# Patient Record
Sex: FEMALE | Race: WHITE | NOT HISPANIC OR LATINO | Employment: STUDENT | URBAN - METROPOLITAN AREA
[De-identification: names, ages, dates, MRNs, and addresses within clinical notes are randomized per-mention and may not be internally consistent; named-entity substitution may affect disease eponyms.]

---
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
## 2021-01-04 ENCOUNTER — OFFICE VISIT (OUTPATIENT)
Dept: GASTROENTEROLOGY | Facility: CLINIC | Age: 21
End: 2021-01-04
Payer: COMMERCIAL

## 2021-01-04 VITALS
HEIGHT: 63 IN | BODY MASS INDEX: 23.78 KG/M2 | DIASTOLIC BLOOD PRESSURE: 80 MMHG | SYSTOLIC BLOOD PRESSURE: 110 MMHG | WEIGHT: 134.2 LBS | HEART RATE: 96 BPM

## 2021-01-04 DIAGNOSIS — R10.9 ABDOMINAL PAIN, UNSPECIFIED ABDOMINAL LOCATION: Primary | ICD-10-CM

## 2021-01-04 DIAGNOSIS — K59.00 CONSTIPATION, UNSPECIFIED CONSTIPATION TYPE: ICD-10-CM

## 2021-01-04 PROCEDURE — 99213 OFFICE O/P EST LOW 20 MIN: CPT | Performed by: PHYSICIAN ASSISTANT

## 2021-01-04 PROCEDURE — 1036F TOBACCO NON-USER: CPT | Performed by: PHYSICIAN ASSISTANT

## 2021-01-04 PROCEDURE — 3008F BODY MASS INDEX DOCD: CPT | Performed by: PHYSICIAN ASSISTANT

## 2021-01-04 NOTE — PATIENT INSTRUCTIONS
Constipation   WHAT YOU NEED TO KNOW:   Constipation is when you have hard, dry bowel movements, or you go longer than usual between bowel movements  DISCHARGE INSTRUCTIONS:   Call your doctor if:   · You have blood in your bowel movements  · You have a fever and abdominal pain with the constipation  · Your constipation gets worse  · You start to vomit  · You have questions or concerns about your condition or care  Medicines:   · Medicine  such as a laxative may help relax and loosen your intestines to help you have a bowel movement  Your provider may recommend you only use laxatives for a short time  Long-term use may make your bowels dependent on the medicine  · Take your medicine as directed  Contact your healthcare provider if you think your medicine is not helping or if you have side effects  Tell him of her if you are allergic to any medicine  Keep a list of the medicines, vitamins, and herbs you take  Include the amounts, and when and why you take them  Bring the list or the pill bottles to follow-up visits  Carry your medicine list with you in case of an emergency  Relieve constipation:   · A suppository  may be used to help soften your bowel movements  This may make them easier to pass  A suppository is guided into your rectum through your anus  · An enema  is liquid medicine used to clear bowel movement from your rectum  The medicine is put into your rectum through your anus  Prevent constipation:   · Drink liquids as directed  You may need to drink extra liquids to help soften and move your bowels  Ask how much liquid to drink each day and which liquids are best for you  · Eat high-fiber foods  This may help decrease constipation by adding bulk to your bowel movements  High-fiber foods include fruit, vegetables, whole-grain breads and cereals, and beans  Your healthcare provider or dietitian can help you create a high-fiber meal plan   Your provider may also recommend a fiber supplement if you cannot get enough fiber from food  · Exercise regularly  Regular physical activity can help stimulate your intestines  Walking is a good exercise to prevent or relieve constipation  Ask which exercises are best for you  · Schedule a time each day to have a bowel movement  This may help train your body to have regular bowel movements  Bend forward while you are on the toilet to help move the bowel movement out  Sit on the toilet for at least 10 minutes, even if you do not have a bowel movement  · Talk to your healthcare provider about your medicines  Certain medicines, such as opioids, can cause constipation  Your provider may be able to make medicine changes  For example, he or she may change the kind of medicine, or change when you take it  Follow up with your healthcare provider as directed:  Write down your questions so you remember to ask them during your visits  © Copyright [address] Information is for End User's use only and may not be sold, redistributed or otherwise used for commercial purposes  All illustrations and images included in CareNotes® are the copyrighted property of [employer]  or [facility] Ranjith Bernal   The above information is an  only  It is not intended as medical advice for individual conditions or treatments  Talk to your doctor, nurse or pharmacist before following any medical regimen to see if it is safe and effective for you

## 2021-01-04 NOTE — PROGRESS NOTES
[facility]  Pantera Vivar 24 y o  female MRN: [id number]  Encounter: [id number]          ASSESSMENT AND PLAN:      1  Abdominal pain, unspecified abdominal location  2  Constipation, unspecified constipation type  -Will continue Elavil 10 mg q h s  Emanuel Ayala -Patient will continue Amitiza which she will adjust the dose as necessary   -Will continue Bentyl 20 mg as needed     -Follow-up in 3 months   ______________________________________________________________________    SUBJECTIVE:    23-year-old female who presents for follow-up of irritable bowel syndrome constipation predominance  Patient right now currently is maintained on amitriptyline 10 mg q h s  As well as dicyclomine 20 mg as needed  Patient also reports that during her last visit we did give her samples of Amitiza 8 micro g which she has been taking she is unsure whether this is working at 100% or not  She reports she is having a bowel movement almost on a daily basis  She denies any melena or rectal bleeding  She reports that for the most part all in all she feels well  Patient reports that her weight is stable and her appetite is good  REVIEW OF SYSTEMS IS OTHERWISE NEGATIVE        Historical Information   Past Medical History:   Diagnosis Date    Anxiety     Concussion     Last Assessed: 11/15/2017    Depression      Past Surgical History:   Procedure Laterality Date    APPENDECTOMY       Social History   Social History     Substance and Sexual Activity   Alcohol Use Yes    Frequency: Monthly or less    Comment: once or twice a year     Social History     Substance and Sexual Activity   Drug Use No     Social History     Tobacco Use   Smoking Status Never Smoker   Smokeless Tobacco Current User   Tobacco Comment    Vape     Family History   Problem Relation Age of Onset    Restless legs syndrome Mother     Depression Mother     Depression Father     Colon cancer Paternal Grandmother     Uterine cancer Paternal Grandmother     OCD Sister     Anxiety disorder Brother     Hashimoto's thyroiditis Maternal Grandmother     Breast cancer Neg Hx     Ovarian cancer Neg Hx     Cervical cancer Neg Hx        Meds/Allergies       Current Outpatient Medications:     albuterol (PROVENTIL HFA,VENTOLIN HFA) 90 mcg/act inhaler    amitriptyline (ELAVIL) 10 mg tablet    dicyclomine (BENTYL) 20 mg tablet    drospirenone-ethinyl estradiol (BERTRAM) 3-0 02 MG per tablet    fluconazole (DIFLUCAN) 150 mg tablet    Lidocaine Viscous HCl (XYLOCAINE) 2 % mucosal solution    meloxicam (MOBIC) 15 mg tablet    triamcinolone (KENALOG) 0 1 % oral topical paste    venlafaxine (EFFEXOR-XR) 37 5 mg 24 hr capsule    venlafaxine (EFFEXOR-XR) 75 mg 24 hr capsule    famciclovir (FAMVIR) 500 mg tablet    No Known Allergies        Objective     Blood pressure 110/80, pulse 96, height 5' 3" (1 6 m), weight 60 9 kg (134 lb 3 2 oz), not currently breastfeeding  Body mass index is 23 77 kg/m²  PHYSICAL EXAM:      General Appearance:   Alert, cooperative, no distress   HEENT:   Normocephalic, atraumatic, anicteric      Neck:  Supple, symmetrical, trachea midline   Lungs:   Clear to auscultation bilaterally; no rales, rhonchi or wheezing; respirations unlabored    Heart[de-identified]   Regular rate and rhythm; no murmur, rub, or gallop  Abdomen:   Soft, non-tender, non-distended; normal bowel sounds; no masses, no organomegaly    Genitalia:   Deferred    Rectal:   Deferred    Extremities:  No cyanosis, clubbing or edema    Pulses:  2+ and symmetric    Skin:  No jaundice, rashes, or lesions    Lymph nodes:  No palpable cervical lymphadenopathy        Lab Results:   No visits with results within 1 Day(s) from this visit     Latest known visit with results is:   Lab on 12/09/2020   Component Date Value    Hepatitis B Surface Ag 12/09/2020 Non-reactive     Hepatitis C Ab 12/09/2020 Non-reactive     Hep B C IgM 12/09/2020 Non-reactive     Hep B Core Total Ab 12/09/2020 Non-reactive     Cyclic Citrullin Peptide* 12/09/2020 5     Ferritin 12/09/2020 16     SS-A (RO) Ab 12/09/2020 <0 2     SS-B (LA) Ab 12/09/2020 <0 2     ds DNA Ab 12/09/2020 1     Anti FRED-1 12/09/2020 <0 2     Scleroderma SCL-70 12/09/2020 <0 2     Beta-2 Glyco 1 IgG 12/09/2020 <9     Beta-2 Glyco 1 IgA 12/09/2020 <9     Beta-2 Glyco 1 IgM 12/09/2020 <9     C3 Complement 12/09/2020 147 0     C4, COMPLEMENT 12/09/2020 40 0     Anticardiolipin IgA 12/09/2020 <9     Anticardiolipin IgG 12/09/2020 <9     Anticardiolipin IgM 12/09/2020 <9     IgA 12/09/2020 185     Gliadin IgA 12/09/2020 5     Gliadin IgG 12/09/2020 10     Tissue Transglut Ab IGG 12/09/2020 5     TISSUE TRANSGLUTAMINASE * 12/09/2020 <2     Endomysial IgA 12/09/2020 Negative     Anti-Centromere B Antibo* 12/09/2020 <0 2     HSV 1 IgG 12/09/2020 <0 91     HSV 2 IGG, TYPE SPEC 12/09/2020 <0 91     PTT Lupus Anticoagulant 12/09/2020 44 4     Dilute Viper Venom Time 12/09/2020 39 8     DILUTE PROTHROMBIN TIME(* 12/09/2020 32 0     THROMBIN TIME (DRVW) 12/09/2020 16 5     DPT CONFIRM RATIO 12/09/2020 1 15     LUPUS REFLEX INTERPRETAT* 12/09/2020 Comment:     SHAAN Corbett (SM) Ab 12/09/2020 <0 2     SHAAN RNP Ab 12/09/2020 <0 2     Miscellaneous Lab Test R* 12/09/2020 SEE WRITTEN REPORT     HIV-1/HIV-2 Ab 12/09/2020 Non-Reactive          Radiology Results:   No results found

## 2021-01-04 NOTE — LETTER
January 4, 2021     [place] Asher, [address] [address]    Patient: Lea Painter   YOB: 2000   Date of Visit: 1/4/2021       Dear Dr Pittman Brought: Thank you for referring Ke Irving to me for evaluation  Below are my notes for this consultation  If you have questions, please do not hesitate to call me  I look forward to following your patient along with you  Sincerely,        Hunter Walters PA-C        CC: No Recipients  Seun Evans  1/4/2021  1:01 PM  Sign when Signing Visit  [facility] - Outpatient Follow-up Note  Lea Painter 24 y o  female MRN: [id number]  Encounter: [id number]          ASSESSMENT AND PLAN:      1  Abdominal pain, unspecified abdominal location  2  Constipation, unspecified constipation type  -Will continue Elavil 10 mg q h s  Shmuel Patel -Patient will continue Amitiza which she will adjust the dose as necessary   -Will continue Bentyl 20 mg as needed     -Follow-up in 3 months   ______________________________________________________________________    SUBJECTIVE:    13-year-old female who presents for follow-up of irritable bowel syndrome constipation predominance  Patient right now currently is maintained on amitriptyline 10 mg q h s  As well as dicyclomine 20 mg as needed  Patient also reports that during her last visit we did give her samples of Amitiza 8 micro g which she has been taking she is unsure whether this is working at 100% or not  She reports she is having a bowel movement almost on a daily basis  She denies any melena or rectal bleeding  She reports that for the most part all in all she feels well  Patient reports that her weight is stable and her appetite is good  REVIEW OF SYSTEMS IS OTHERWISE NEGATIVE        Historical Information   Past Medical History:   Diagnosis Date    Anxiety     Concussion     Last Assessed: 11/15/2017    Depression      Past Surgical History: Procedure Laterality Date    APPENDECTOMY       Social History   Social History     Substance and Sexual Activity   Alcohol Use Yes    Frequency: Monthly or less    Comment: once or twice a year     Social History     Substance and Sexual Activity   Drug Use No     Social History     Tobacco Use   Smoking Status Never Smoker   Smokeless Tobacco Current User   Tobacco Comment    Vape     Family History   Problem Relation Age of Onset    Restless legs syndrome Mother     Depression Mother     Depression Father     Colon cancer Paternal Grandmother     Uterine cancer Paternal Grandmother    [facility] OCD Sister     Anxiety disorder Brother     Hashimoto's thyroiditis Maternal Grandmother     Breast cancer Neg Hx     Ovarian cancer Neg Hx     Cervical cancer Neg Hx        Meds/Allergies       Current Outpatient Medications:     albuterol (PROVENTIL HFA,VENTOLIN HFA) 90 mcg/act inhaler    amitriptyline (ELAVIL) 10 mg tablet    dicyclomine (BENTYL) 20 mg tablet    drospirenone-ethinyl estradiol (BERTRAM) 3-0 02 MG per tablet    fluconazole (DIFLUCAN) 150 mg tablet    Lidocaine Viscous HCl (XYLOCAINE) 2 % mucosal solution    meloxicam (MOBIC) 15 mg tablet    triamcinolone (KENALOG) 0 1 % oral topical paste    venlafaxine (EFFEXOR-XR) 37 5 mg 24 hr capsule    venlafaxine (EFFEXOR-XR) 75 mg 24 hr capsule    famciclovir (FAMVIR) 500 mg tablet    No Known Allergies        Objective     Blood pressure 110/80, pulse 96, height 5' 3" (1 6 m), weight 60 9 kg (134 lb 3 2 oz), not currently breastfeeding  Body mass index is 23 77 kg/m²  PHYSICAL EXAM:      General Appearance:   Alert, cooperative, no distress   HEENT:   Normocephalic, atraumatic, anicteric      Neck:  Supple, symmetrical, trachea midline   Lungs:   Clear to auscultation bilaterally; no rales, rhonchi or wheezing; respirations unlabored    Heart[de-identified]   Regular rate and rhythm; no murmur, rub, or gallop     Abdomen:   Soft, non-tender, non-distended; normal bowel sounds; no masses, no organomegaly    Genitalia:   Deferred    Rectal:   Deferred    Extremities:  No cyanosis, clubbing or edema    Pulses:  2+ and symmetric    Skin:  No jaundice, rashes, or lesions    Lymph nodes:  No palpable cervical lymphadenopathy        Lab Results:   No visits with results within 1 Day(s) from this visit     Latest known visit with results is:   Lab on 12/09/2020   Component Date Value    Hepatitis B Surface Ag 12/09/2020 Non-reactive     Hepatitis C Ab 12/09/2020 Non-reactive     Hep B C IgM 12/09/2020 Non-reactive     Hep B Core Total Ab 12/09/2020 Non-reactive     Cyclic Citrullin Peptide* 12/09/2020 5     Ferritin 12/09/2020 16     SS-A (RO) Ab 12/09/2020 <0 2     SS-B (LA) Ab 12/09/2020 <0 2     ds DNA Ab 12/09/2020 1     Anti FRED-1 12/09/2020 <0 2     Scleroderma SCL-70 12/09/2020 <0 2     Beta-2 Glyco 1 IgG 12/09/2020 <9     Beta-2 Glyco 1 IgA 12/09/2020 <9     Beta-2 Glyco 1 IgM 12/09/2020 <9     C3 Complement 12/09/2020 147 0     C4, COMPLEMENT 12/09/2020 40 0     Anticardiolipin IgA 12/09/2020 <9     Anticardiolipin IgG 12/09/2020 <9     Anticardiolipin IgM 12/09/2020 <9     IgA 12/09/2020 185     Gliadin IgA 12/09/2020 5     Gliadin IgG 12/09/2020 10     Tissue Transglut Ab IGG 12/09/2020 5     TISSUE TRANSGLUTAMINASE * 12/09/2020 <2     Endomysial IgA 12/09/2020 Negative     Anti-Centromere B Antibo* 12/09/2020 <0 2     HSV 1 IgG 12/09/2020 <0 91     HSV 2 IGG, TYPE SPEC 12/09/2020 <0 91     PTT Lupus Anticoagulant 12/09/2020 44 4     Dilute Viper Venom Time 12/09/2020 39 8     DILUTE PROTHROMBIN TIME(* 12/09/2020 32 0     THROMBIN TIME (DRVW) 12/09/2020 16 5     DPT CONFIRM RATIO 12/09/2020 1 15     LUPUS REFLEX INTERPRETAT* 12/09/2020 Comment:     SHAAN Corbett (SM) Ab 12/09/2020 <0 2     SHAAN RNP Ab 12/09/2020 <0 2     Miscellaneous Lab Test R* 12/09/2020 SEE WRITTEN REPORT     HIV-1/HIV-2 Ab 12/09/2020 Non-Reactive Radiology Results:   No results found

## 2021-01-19 DIAGNOSIS — R10.9 ABDOMINAL PAIN, UNSPECIFIED ABDOMINAL LOCATION: ICD-10-CM

## 2021-01-19 DIAGNOSIS — R19.8 CHANGE IN BOWEL FUNCTION: ICD-10-CM

## 2021-01-19 RX ORDER — AMITRIPTYLINE HYDROCHLORIDE 10 MG/1
10 TABLET, FILM COATED ORAL
Qty: 90 TABLET | Refills: 3 | Status: SHIPPED | OUTPATIENT
Start: 2021-01-19 | End: 2021-04-12

## 2021-02-10 DIAGNOSIS — Z30.41 ENCOUNTER FOR BIRTH CONTROL PILLS MAINTENANCE: ICD-10-CM

## 2021-02-10 RX ORDER — DROSPIRENONE AND ETHINYL ESTRADIOL 0.02-3(28)
KIT ORAL
Qty: 28 TABLET | Refills: 0 | Status: SHIPPED | OUTPATIENT
Start: 2021-02-10 | End: 2021-02-17 | Stop reason: SDUPTHER

## 2021-02-17 ENCOUNTER — ANNUAL EXAM (OUTPATIENT)
Dept: OBGYN CLINIC | Facility: CLINIC | Age: 21
End: 2021-02-17
Payer: COMMERCIAL

## 2021-02-17 VITALS
DIASTOLIC BLOOD PRESSURE: 86 MMHG | BODY MASS INDEX: 23.57 KG/M2 | SYSTOLIC BLOOD PRESSURE: 122 MMHG | WEIGHT: 133 LBS | HEIGHT: 63 IN

## 2021-02-17 DIAGNOSIS — Z30.41 SURVEILLANCE OF CONTRACEPTIVE PILL: ICD-10-CM

## 2021-02-17 DIAGNOSIS — Z01.411 ENCOUNTER FOR GYNECOLOGICAL EXAMINATION WITH ABNORMAL FINDING: Primary | ICD-10-CM

## 2021-02-17 DIAGNOSIS — R23.2 HOT FLASHES: ICD-10-CM

## 2021-02-17 DIAGNOSIS — Z11.3 SCREENING FOR STD (SEXUALLY TRANSMITTED DISEASE): ICD-10-CM

## 2021-02-17 DIAGNOSIS — Z30.41 ENCOUNTER FOR BIRTH CONTROL PILLS MAINTENANCE: ICD-10-CM

## 2021-02-17 DIAGNOSIS — N76.1 CHRONIC VAGINITIS: ICD-10-CM

## 2021-02-17 PROBLEM — Z01.419 ENCOUNTER FOR GYNECOLOGICAL EXAMINATION WITHOUT ABNORMAL FINDING: Status: RESOLVED | Noted: 2020-02-07 | Resolved: 2021-02-17

## 2021-02-17 PROBLEM — Z01.419 ENCOUNTER FOR ANNUAL ROUTINE GYNECOLOGICAL EXAMINATION: Status: RESOLVED | Noted: 2019-01-29 | Resolved: 2021-02-17

## 2021-02-17 PROBLEM — Z00.00 ANNUAL PHYSICAL EXAM: Status: RESOLVED | Noted: 2020-07-30 | Resolved: 2021-02-17

## 2021-02-17 PROBLEM — N89.8 VAGINAL DISCHARGE: Status: RESOLVED | Noted: 2019-01-29 | Resolved: 2021-02-17

## 2021-02-17 PROBLEM — Z23 NEED FOR HPV VACCINATION: Status: RESOLVED | Noted: 2020-02-07 | Resolved: 2021-02-17

## 2021-02-17 PROBLEM — Z23 ENCOUNTER FOR IMMUNIZATION: Status: RESOLVED | Noted: 2020-09-08 | Resolved: 2021-02-17

## 2021-02-17 PROBLEM — Z76.89 ENCOUNTER TO ESTABLISH CARE: Status: RESOLVED | Noted: 2020-07-16 | Resolved: 2021-02-17

## 2021-02-17 PROCEDURE — S0612 ANNUAL GYNECOLOGICAL EXAMINA: HCPCS | Performed by: NURSE PRACTITIONER

## 2021-02-17 PROCEDURE — 87491 CHLMYD TRACH DNA AMP PROBE: CPT | Performed by: NURSE PRACTITIONER

## 2021-02-17 PROCEDURE — 87591 N.GONORRHOEAE DNA AMP PROB: CPT | Performed by: NURSE PRACTITIONER

## 2021-02-17 RX ORDER — DROSPIRENONE AND ETHINYL ESTRADIOL 0.02-3(28)
1 KIT ORAL DAILY
Qty: 28 TABLET | Refills: 12 | Status: SHIPPED | OUTPATIENT
Start: 2021-02-17 | End: 2022-02-23

## 2021-02-17 RX ORDER — FLUCONAZOLE 150 MG/1
150 TABLET ORAL ONCE
Qty: 2 TABLET | Refills: 2 | Status: SHIPPED | OUTPATIENT
Start: 2021-02-17 | End: 2021-02-17

## 2021-02-17 NOTE — PROGRESS NOTES
Diagnoses and all orders for this visit:    Encounter for gynecological examination with abnormal finding    Chronic vaginitis  -     Chlamydia/GC amplified DNA by PCR  -     fluconazole (DIFLUCAN) 150 mg tablet; Take 1 tablet (150 mg total) by mouth once for 1 dose Once and repeat in 3 days    Surveillance of contraceptive pill    Hot flashes  -     TSH, 3rd generation with Free T4 reflex; Future    Screening for STD (sexually transmitted disease)  -     Chlamydia/GC amplified DNA by PCR    Encounter for birth control pills maintenance  -     drospirenone-ethinyl estradiol (BERTRAM) 3-0 02 MG per tablet; Take 1 tablet by mouth daily        Calcium/vit d inclusion in the diet discussed, call with any issues, SBE reinforced, all concerns addressed  Pleasant 24 y o  premenopausal female here for annual exam  She denies any issues with bleeding or her menses  Reports regular cycles on ocp  Denies vaginal issues  Denies pelvic pain  Denies any issues with her BCM  Sexually active without any concerns  Agrees to an STD recheck, GC/CT neg 2018, same partner x 4 yrs  Requests diflucan refills for frequent yeast  Received Gardasils x 3  She states she had had hot flashes for the past few months, will check TSH and if neg may increase her ocp      Past Medical History:   Diagnosis Date    Anxiety     Concussion     Last Assessed: 11/15/2017    Depression      Past Surgical History:   Procedure Laterality Date    APPENDECTOMY       Family History   Problem Relation Age of Onset    Restless legs syndrome Mother     Depression Mother     Depression Father     Colon cancer Paternal Grandmother     Uterine cancer Paternal Grandmother     OCD Sister     Anxiety disorder Brother     Hashimoto's thyroiditis Maternal Grandmother     Breast cancer Neg Hx     Ovarian cancer Neg Hx     Cervical cancer Neg Hx      Social History     Tobacco Use    Smoking status: Never Smoker    Smokeless tobacco: Current User    Tobacco comment: Vape   Substance Use Topics    Alcohol use: Yes     Frequency: Monthly or less     Comment: once or twice a year    Drug use: No       Current Outpatient Medications:     albuterol (PROVENTIL HFA,VENTOLIN HFA) 90 mcg/act inhaler, Inhale 1-2 puffs, Disp: , Rfl: 0    amitriptyline (ELAVIL) 10 mg tablet, Take 1 tablet (10 mg total) by mouth daily at bedtime, Disp: 90 tablet, Rfl: 3    dicyclomine (BENTYL) 20 mg tablet, Take 1 tablet (20 mg total) by mouth every 6 (six) hours, Disp: 60 tablet, Rfl: 3    drospirenone-ethinyl estradiol (BERTRAM) 3-0 02 MG per tablet, Take 1 tablet by mouth daily, Disp: 28 tablet, Rfl: 12    venlafaxine (EFFEXOR-XR) 37 5 mg 24 hr capsule, Take 1 capsule (37 5 mg total) by mouth daily, Disp: 90 capsule, Rfl: 1    venlafaxine (EFFEXOR-XR) 75 mg 24 hr capsule, Take 1 capsule (75 mg total) by mouth daily, Disp: 90 capsule, Rfl: 1    famciclovir (FAMVIR) 500 mg tablet, Take 1 tablet (500 mg total) by mouth 2 (two) times a day for 5 days, Disp: 10 tablet, Rfl: 0    fluconazole (DIFLUCAN) 150 mg tablet, Take 1 tablet (150 mg total) by mouth once for 1 dose Once and repeat in 3 days, Disp: 2 tablet, Rfl: 2    Lidocaine Viscous HCl (XYLOCAINE) 2 % mucosal solution, Swish and spit 15 mL 4 (four) times a day as needed for mouth pain or discomfort (Patient not taking: Reported on 2/17/2021), Disp: 100 mL, Rfl: 0    meloxicam (MOBIC) 15 mg tablet, Take 1 tablet (15 mg total) by mouth daily (Patient not taking: Reported on 2/17/2021), Disp: 30 tablet, Rfl: 0    triamcinolone (KENALOG) 0 1 % oral topical paste, Apply 1 application topically 2 (two) times a day (Patient not taking: Reported on 2/17/2021), Disp: 5 g, Rfl: 1  Patient Active Problem List    Diagnosis Date Noted    Chronic pain of both knees 09/08/2020    Canker sores oral 09/08/2020    Generalized anxiety disorder 04/16/2018    Current moderate episode of major depressive disorder without prior episode ([facility] [address] ) 2018    Post concussion syndrome 10/12/2017    Concussion with no loss of consciousness 10/03/2017    Closed fracture of left side of occipital bone (HCC) 2017    Chronic vaginitis 03/15/2017    Acne 2016       No Known Allergies    OB History    Para Term  AB Living   0 0 0 0 0 0   SAB TAB Ectopic Multiple Live Births   0 0 0 0 0     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, working on her E  [employer] as well  Vitals:    21 1213   BP: 122/86   BP Location: Left arm   Patient Position: Sitting   Cuff Size: Standard   Weight: 60 3 kg (133 lb)   Height: 5' 3" (1 6 m)     Body mass index is 23 56 kg/m²  Review of Systems   Constitutional: Negative for chills, fatigue, fever and unexpected weight change  Respiratory: Negative for shortness of breath  Gastrointestinal: Negative for anal bleeding, blood in stool, constipation and diarrhea  Genitourinary: Negative for difficulty urinating, dysuria and hematuria  Physical Exam   Constitutional: She appears well-developed and well-nourished  No distress  HENT:   Head: Normocephalic  Neck: Normal range of motion  Neck supple  Pulmonary: Effort normal   Breasts: bilateral without masses, skin changes or nipple discharge  Bilaterally soft and warm to touch  No areas of erythema or pain  Abdominal: Soft  Pelvic exam was performed with patient supine  No labial fusion  There is no rash, tenderness, lesion or injury on the right labia  There is no rash, tenderness, lesion or injury on the left labia  Urethral meatus does not show any tenderness, inflammation or discharge  Palpation of midline bladder without pain or discomfort  Uterus is not deviated, not enlarged, not fixed and not tender  Cervix exhibits no motion tenderness, no discharge and no friability  Right adnexum displays no mass, no tenderness and no fullness  Left adnexum displays no mass, no tenderness and no fullness   No erythema or tenderness in the vagina  No foreign body in the vagina  No signs of injury around the vagina  Scant vaginal discharge found (yeastlike) but she is asymptomatic  No signs of injury around the vagina or anus  Perineum without lesions, signs of injury, erythema or swelling  Lymphadenopathy:        Right: No inguinal adenopathy present  Left: No inguinal adenopathy present

## 2021-02-17 NOTE — PATIENT INSTRUCTIONS
Breast Self Exam for Women   WHAT YOU NEED TO KNOW:   What is a breast self-exam (BSE)? A BSE is a way to check your breasts for lumps and other changes  Regular BSEs can help you know how your breasts normally look and feel  Most breast lumps or changes are not cancer, but you should always have them checked by a healthcare provider  Your healthcare provider can also watch you do a BSE and can tell you if you are doing your BSE correctly  Why should I do a BSE? Breast cancer is the most common type of cancer in women  Even if you have mammograms, you may still want to do a BSE regularly  If you know how your breasts normally feel and look, it may help you know when to contact your healthcare provider  Mammograms can miss some cancers  You may find a lump during a BSE that did not show up on a mammogram   When should I do a BSE? If you have periods, you may want to do your BSE 1 week after your period ends  This is the time when your breasts may be the least swollen, lumpy, or tender  You can do regular BSEs even if you are breastfeeding or have breast implants  How should I do a BSE? · Look at your breasts in a mirror  Look at the size and shape of each breast and nipple  Check for swelling, lumps, dimpling, scaly skin, or other skin changes  Look for nipple changes, such as a nipple that is painful or beginning to pull inward  Gently squeeze both nipples and check to see if fluid (that is not breast milk) comes out of them  If you find any of these or other breast changes, contact your healthcare provider  Check your breasts while you sit or  the following 3 positions:    ? Hang your arms down at your sides  ? Raise your hands and join them behind your head  ? Put firm pressure with your hands on your hips  Bend slightly forward while you look at your breasts in the mirror  · Lie down and feel your breasts    When you lie down, your breast tissue spreads out evenly over your chest  This makes it easier for you to feel for lumps and anything that may not be normal for your breasts  Do a BSE on one breast at a time  ? Place a small pillow or towel under your left shoulder  Put your left arm behind your head  ? Use the 3 middle fingers of your right hand  Use your fingertip pads, on the top of your fingers  Your fingertip pad is the most sensitive part of your finger  ? Use small circles to feel your breast tissue  Use your fingertip pads to make dime-sized, overlapping circles on your breast and armpits  Use light, medium, and firm pressure  First, press lightly  Second, press with medium pressure to feel a little deeper into the breast  Last, use firm pressure to feel deep within your breast     ? Examine your entire breast area  Examine the breast area from above the breast to below the breast where you feel only ribs  Make small circles with your fingertips, starting in the middle of your armpit  Make circles going up and down the breast area  Continue toward your breast and all the way across it  Examine the area from your armpit all the way over to the middle of your chest (breastbone)  Stop at the middle of your chest     ? Move the pillow or towel to your right shoulder, and put your right arm behind your head  Use the 3 fingertip pads of your left hand, and repeat the above steps to do a BSE on your right breast   What else can I do to check for breast problems or cancer? Talk to your healthcare provider about mammograms  A mammogram is an x-ray of your breasts to screen for breast cancer or other problems  Your provider can tell you the benefits and risks of mammograms  The first mammogram is usually at age 39 or 48  Your provider may recommend you start at 36 or younger if your risk for breast cancer is high  Mammograms usually continue every 1 to 2 years until age 76  When should I call my doctor? · You find any lumps or changes in your breasts      · You have breast pain or fluid coming from your nipples  · You have questions or concerns or concerns about your condition or care  CARE AGREEMENT:   You have the right to help plan your care  Learn about your health condition and how it may be treated  Discuss treatment options with your healthcare providers to decide what care you want to receive  You always have the right to refuse treatment  The above information is an  only  It is not intended as medical advice for individual conditions or treatments  Talk to your doctor, nurse or pharmacist before following any medical regimen to see if it is safe and effective for you  © Copyright [address] [place] [address] Information is for End User's use only and may not be sold, redistributed or otherwise used for commercial purposes   All illustrations and images included in CareNotes® are the copyrighted property of A D A M , Inc  or [address] [place] [address]

## 2021-02-19 LAB
C TRACH DNA SPEC QL NAA+PROBE: NEGATIVE
N GONORRHOEA DNA SPEC QL NAA+PROBE: NEGATIVE

## 2021-03-30 NOTE — PROGRESS NOTES
Assessment and Plan:   Ms Priyanka Martin is a 28-year-old female with history significant for irritable bowel syndrome with constipation, anxiety and depression, who presents for follow up of multiple complaints including recurrent mouth ulcerations and joint pains        - Brenda presents today for follow up of multiple complaints she has been experiencing since 2020, including recurrent painful mouth ulcerations, lower abdominal discomfort, possible Raynaud's phenomenon effecting her bilateral feet as well as symptoms of progressive arthralgias involving her hips and knees over the past 3 years        - In regards to the lower abdominal discomfort she has had a Gastroenterology evaluation performed which was unrevealing specifically for an inflammatory bowel disease and this has been determined to be secondary to irritable bowel syndrome  The symptoms of sensitivity and color changes she experiences effecting her feet do not sound clearly representative of Raynaud's phenomenon as this will appear spontaneously and not necessarily brought on by any specific exposures  In terms of the arthralgias her symptoms are less concerning for an inflammatory arthropathy based on absence of prominent swelling or prolonged morning stiffness and there was no synovitis on her examination previously  It is possible these complaints have been arising independently and not associated with an underlying unifying disorder     - To further evaluate for a possible underlying autoimmune disorder, she did have extensive testing done which in the past has showed negative ABEL screens with a more recent ABEL by immunofluorescence showing a dense fine speckled pattern  Otherwise her specific serologies were all negative    I advised her with a history of prior negative ABEL screens and a current dense fine speckled pattern which may have less of an association with autoimmune diseases, we do not have sufficient information to diagnose her with a connective tissue disorder  I recommend continued monitoring for now and for her symptoms to be managed individually  It is reassuring to note that she has not had any recent mouth ulcerations and I question if this may be secondary to idiopathic recurrent aphthous stomatitis  - I requested she make me aware if she has any worsening of existing symptoms or new complaints that arise  Otherwise I will see her back for a follow-up in 1 year to review and repeat her serologies  Plan:  Diagnoses and all orders for this visit:    Mouth ulcers    Diffuse arthralgia    Irritable bowel syndrome with constipation    Positive ABEL (antinuclear antibody)      Activities as tolerated  Exercise: try to maintain a low impact exercise regimen as much as possible  Walk for 30 minutes a day for at least 3 days a week  Continue other medications as prescribed by PCP and other specialists  RTC in 1 year  HPI    INITIAL VISIT NOTE (12/2020):  Ms Santosh Hines is a 16-year-old female with history significant for irritable bowel syndrome with constipation, anxiety and depression, who presents for further evaluation of multiple complaints including recurrent mouth ulcerations and joint pains  She is referred by MAYRA Roach for a rheumatology consult      Patient reports she has experienced joint pains affecting her low back/hip region and bilateral knees probably since her childhood, but she has noticed them to be more prominent and progressive over the past 2-3 years  She had seen orthopedics while she was in high school and had to utilize knee braces  She reports while the pain may be present on a low level intensity nearly every day, approximately once per week she will have a significant flare-up of the pain  She reports with resting she may get some relief but not every time  She does take over-the-counter ibuprofen as needed for the joint pains which helps    In terms of the low back pain this does not wake her up from sleep at night and is not associated with morning stiffness  She reports activities will generally worsen her pain and she does get some relief with resting  At times she may notice an achiness sensation in her hands  She denies any joint pains of her wrists, elbows, shoulders, ankles or feet  Intermittently she has noticed mild swelling affecting her knees  She experiences morning stiffness only affecting her knees which takes about 30-45 minutes to improve  She has had x-rays of her hips and knees done in 2018 and 2020 which were all normal      Over the past 6 months she has also noticed additional symptoms arise such as recurrent painful mouth ulcerations that appear like canker sores  They resolve spontaneously but she states over the past few months they have been present on a near constant basis  She has seen her primary care physician for this and had an HSV swab done which was normal   She has also been trialed on multiple medications including fluconazole, famciclovir, lidocaine mucosal solution and triamcinolone topical paste which have not helped significantly  She does not get any ulcers in her nose or in her genital region, but does describe frequent yeast infections        In the past 6 months she has also had issues with lower abdominal pain flare-ups associated with constipation  She was seen by Gastroenterology and had a CT abdomen as well as colonoscopy done which were all normal   She was diagnosed with irritable bowel syndrome with constipation  She has noticed issues relating to her toes being very sensitive with color changes to white, but this does not always happen with cold exposure and seems to occur spontaneously    She does not notice any color changes affecting her hands      She denies fevers, chills, unintentional weight loss (has noticed night sweats), hair loss, dry eyes (does notice dry mouth), inflammatory eye disease, skin rash, psoriasis, photosensitivity, swollen glands, pleuritic chest pain, shortness of breath, blood in stools, blood clots, miscarriages, symptoms strongly suggestive of Raynaud's or family history of autoimmune disease  She does have 2 siblings who are healthy  She did have appendicitis when she was in 2nd grade but otherwise had a healthy childhood      In view of the constellation of symptoms she was seen by her primary care physician and had testing done in September 2020 which showed a normal ABEL, CK, rheumatoid factor, Lyme antibody profile and ESR  The CRP was minimally elevated at 5  Prior to that a CBC, CMP, TSH and vitamin-D were normal   She did have testing done in October 2018 as well which showed a normal ABEL, rheumatoid factor and HLA B27 antigen  The ESR and CRP were minimally elevated at 25 and 6, respectively  3/31/2021:  Patient presents for a follow-up today  I reviewed her labs which showed a positive ABEL by immunofluorescence 1:160 dense fine speckled pattern  The ABEL specificity, C3, C4, antiphospholipid antibody testing, urinalysis, urine protein creatinine ratio, hepatitis panel, ferritin, anti CCP antibody, celiac disease antibody profile, HIV testing and herpes antibodies were unremarkable  Since the last office visit she has not had any new complaints and continues to experience the night sweats, lower abdominal discomfort, Raynaud's like symptoms and joint pains  She states that the mouth ulcers resolved spontaneously and she has not had any in the past 1 and half months  She did try the over-the-counter zinc but this was not helpful  The following portions of the patient's history were reviewed and updated as appropriate: allergies, current medications, past family history, past medical history, past social history, past surgical history and problem list       Review of Systems  Constitutional: Negative for weight change, fevers, chills, fatigue  Positive for night sweats  ENT/Mouth: Negative for hearing changes, ear pain, nasal congestion, sinus pain, hoarseness, sore throat, rhinorrhea, swallowing difficulty  Eyes: Negative for pain, redness, discharge, vision changes  Cardiovascular: Negative for chest pain, SOB, palpitations  Respiratory: Negative for cough, sputum, wheezing, dyspnea  Gastrointestinal: Negative for nausea, vomiting, diarrhea, constipation, pain, heartburn  Genitourinary: Negative for dysuria, urinary frequency, hematuria  Musculoskeletal: As per HPI  Skin: Negative for skin rash  Positive for color changes  Neuro: Negative for weakness, numbness, tingling, loss of consciousness  Psych: Negative for anxiety, depression  Heme/Lymph: Negative for easy bruising, bleeding, lymphadenopathy          Past Medical History:   Diagnosis Date    Anxiety     Concussion     Last Assessed: 11/15/2017    Depression        Past Surgical History:   Procedure Laterality Date    APPENDECTOMY         Social History     Socioeconomic History    Marital status: Single     Spouse name: Not on file    Number of children: Not on file    Years of education: Not on file    Highest education level: Not on file   Occupational History    Not on file   Social Needs    Financial resource strain: Not on file    Food insecurity     Worry: Not on file     Inability: Not on file    Transportation needs     Medical: Not on file     Non-medical: Not on file   Tobacco Use    Smoking status: Never Smoker    Smokeless tobacco: Current User    Tobacco comment: Vape   Substance and Sexual Activity    Alcohol use: Yes     Frequency: Monthly or less     Comment: once or twice a year    Drug use: No    Sexual activity: Yes     Partners: Male     Birth control/protection: OCP   Lifestyle    Physical activity     Days per week: Not on file     Minutes per session: Not on file    Stress: Not on file   Relationships    Social connections     Talks on phone: Not on file Gets together: Not on file     Attends Taoist service: Not on file     Active member of club or organization: Not on file     Attends meetings of clubs or organizations: Not on file     Relationship status: Not on file    Intimate partner violence     Fear of current or ex partner: Not on file     Emotionally abused: Not on file     Physically abused: Not on file     Forced sexual activity: Not on file   Other Topics Concern    Not on file   Social History Narrative    Immunization status: up to date and documented         Family History   Problem Relation Age of Onset    Restless legs syndrome Mother     Depression Mother     Depression Father     Colon cancer Paternal Grandmother     Uterine cancer Paternal Grandmother     OCD Sister     Anxiety disorder Brother     Hashimoto's thyroiditis Maternal Grandmother     Breast cancer Neg Hx     Ovarian cancer Neg Hx     Cervical cancer Neg Hx        No Known Allergies      Current Outpatient Medications:     albuterol (PROVENTIL HFA,VENTOLIN HFA) 90 mcg/act inhaler, Inhale 1-2 puffs, Disp: , Rfl: 0    amitriptyline (ELAVIL) 10 mg tablet, Take 1 tablet (10 mg total) by mouth daily at bedtime, Disp: 90 tablet, Rfl: 3    dicyclomine (BENTYL) 20 mg tablet, Take 1 tablet (20 mg total) by mouth every 6 (six) hours, Disp: 60 tablet, Rfl: 3    drospirenone-ethinyl estradiol (BERTRAM) 3-0 02 MG per tablet, Take 1 tablet by mouth daily, Disp: 28 tablet, Rfl: 12    venlafaxine (EFFEXOR-XR) 37 5 mg 24 hr capsule, Take 1 capsule (37 5 mg total) by mouth daily, Disp: 90 capsule, Rfl: 1    venlafaxine (EFFEXOR-XR) 75 mg 24 hr capsule, Take 1 capsule (75 mg total) by mouth daily, Disp: 90 capsule, Rfl: 1    famciclovir (FAMVIR) 500 mg tablet, Take 1 tablet (500 mg total) by mouth 2 (two) times a day for 5 days, Disp: 10 tablet, Rfl: 0    Lidocaine Viscous HCl (XYLOCAINE) 2 % mucosal solution, Swish and spit 15 mL 4 (four) times a day as needed for mouth pain or discomfort (Patient not taking: Reported on 2/17/2021), Disp: 100 mL, Rfl: 0    meloxicam (MOBIC) 15 mg tablet, Take 1 tablet (15 mg total) by mouth daily (Patient not taking: Reported on 2/17/2021), Disp: 30 tablet, Rfl: 0    triamcinolone (KENALOG) 0 1 % oral topical paste, Apply 1 application topically 2 (two) times a day (Patient not taking: Reported on 2/17/2021), Disp: 5 g, Rfl: 1      Objective:    Vitals:    03/31/21 1115   BP: 106/64   Weight: 60 3 kg (133 lb)   Height: 5' 3" (1 6 m)       Physical Exam  General: Well appearing, well nourished, in no distress  Oriented x 3, normal mood and affect  Ambulating without difficulty  Skin: Good turgor, no rash, unusual bruising or prominent lesions  Hair: Normal texture and distribution  Nails: Normal color, no deformities  HEENT:  Head: Normocephalic, atraumatic  Eyes: Conjunctiva clear, sclera non-icteric, EOM intact  Extremities: No amputations or deformities, cyanosis, edema  Musculoskeletal: No swelling visualized  Neurologic: Alert and oriented  No focal neurological deficits appreciated  Psychiatric: Normal mood and affect  BALDEMAR Mondragon    Rheumatology

## 2021-03-31 ENCOUNTER — OFFICE VISIT (OUTPATIENT)
Dept: RHEUMATOLOGY | Facility: CLINIC | Age: 21
End: 2021-03-31
Payer: COMMERCIAL

## 2021-03-31 VITALS
WEIGHT: 133 LBS | SYSTOLIC BLOOD PRESSURE: 106 MMHG | BODY MASS INDEX: 23.57 KG/M2 | DIASTOLIC BLOOD PRESSURE: 64 MMHG | HEIGHT: 63 IN

## 2021-03-31 DIAGNOSIS — K58.1 IRRITABLE BOWEL SYNDROME WITH CONSTIPATION: ICD-10-CM

## 2021-03-31 DIAGNOSIS — R76.8 POSITIVE ANA (ANTINUCLEAR ANTIBODY): ICD-10-CM

## 2021-03-31 DIAGNOSIS — M25.50 DIFFUSE ARTHRALGIA: ICD-10-CM

## 2021-03-31 DIAGNOSIS — K12.1 MOUTH ULCERS: Primary | ICD-10-CM

## 2021-03-31 PROCEDURE — 99213 OFFICE O/P EST LOW 20 MIN: CPT | Performed by: INTERNAL MEDICINE

## 2021-03-31 PROCEDURE — 1036F TOBACCO NON-USER: CPT | Performed by: INTERNAL MEDICINE

## 2021-04-01 ENCOUNTER — OFFICE VISIT (OUTPATIENT)
Dept: GASTROENTEROLOGY | Facility: CLINIC | Age: 21
End: 2021-04-01
Payer: COMMERCIAL

## 2021-04-01 VITALS
HEART RATE: 123 BPM | WEIGHT: 130 LBS | HEIGHT: 63 IN | SYSTOLIC BLOOD PRESSURE: 108 MMHG | BODY MASS INDEX: 23.04 KG/M2 | DIASTOLIC BLOOD PRESSURE: 70 MMHG

## 2021-04-01 DIAGNOSIS — K59.00 CONSTIPATION, UNSPECIFIED CONSTIPATION TYPE: Primary | ICD-10-CM

## 2021-04-01 DIAGNOSIS — R10.9 ABDOMINAL PAIN, UNSPECIFIED ABDOMINAL LOCATION: ICD-10-CM

## 2021-04-01 PROCEDURE — 3008F BODY MASS INDEX DOCD: CPT | Performed by: INTERNAL MEDICINE

## 2021-04-01 PROCEDURE — 99213 OFFICE O/P EST LOW 20 MIN: CPT | Performed by: PHYSICIAN ASSISTANT

## 2021-04-01 NOTE — PATIENT INSTRUCTIONS
Abdominal Pain   WHAT YOU NEED TO KNOW:   Abdominal pain can be dull, achy, or sharp  You may have pain in one area of your abdomen, or in your entire abdomen  Your pain may be caused by a condition such as constipation, food sensitivity or poisoning, infection, or a blockage  Abdominal pain can also be from a hernia, appendicitis, or an ulcer  Liver, gallbladder, or kidney conditions can also cause abdominal pain  The cause of your abdominal pain may be unknown  DISCHARGE INSTRUCTIONS:   Return to the emergency department if:   · You have new chest pain or shortness of breath  · You have pulsing pain in your upper abdomen or lower back that suddenly becomes constant  · Your pain is in the right lower abdominal area and worsens with movement  · You have a fever over 100 4°F (38°C) or shaking chills  · You are vomiting and cannot keep food or liquids down  · Your pain does not improve or gets worse over the next 8 to 12 hours  · You see blood in your vomit or bowel movements, or they look black and tarry  · Your skin or the whites of your eyes turn yellow  · You are a woman and have a large amount of vaginal bleeding that is not your monthly period  Contact your healthcare provider if:   · You have pain in your lower back  · You are a man and have pain in your testicles  · You have pain when you urinate  · You have questions or concerns about your condition or care  Follow up with your healthcare provider within 24 hours or as directed:  Write down your questions so you remember to ask them during your visits  Medicines:   · Medicines  may be given to calm your stomach and prevent vomiting or to decrease pain  Ask how to take pain medicine safely  · Take your medicine as directed  Contact your healthcare provider if you think your medicine is not helping or if you have side effects  Tell him of her if you are allergic to any medicine   Keep a list of the medicines, vitamins, and herbs you take  Include the amounts, and when and why you take them  Bring the list or the pill bottles to follow-up visits  Carry your medicine list with you in case of an emergency  © Copyright [address] Information is for End User's use only and may not be sold, redistributed or otherwise used for commercial purposes  All illustrations and images included in CareNotes® are the copyrighted property of A D A M , Inc  or [facility] Ranjith Bernal   The above information is an  only  It is not intended as medical advice for individual conditions or treatments  Talk to your doctor, nurse or pharmacist before following any medical regimen to see if it is safe and effective for you

## 2021-04-01 NOTE — PROGRESS NOTES
[facility] - Outpatient Follow-up Note  Alden Koyanagi 24 y o  female MRN: [id number]  Encounter: [id number]          ASSESSMENT AND PLAN:      1  Constipation, unspecified constipation type  2  Abdominal pain, unspecified abdominal location  -Will continue MiraLax but it b i d  dosage     -Will continue dicyclomine 2 to 3 times a day  -Patient will restart probiotic daily     -Will continue fiber supplementation twice a day  -Patient will make some dietary changes     -Will have patient follow up in 3 months   ______________________________________________________________________    SUBJECTIVE:   15-year-old female presents to the office today for the evaluation of abdominal pain and constipation  Patient reports that a month ago she had ECT for Elavil 10 mg because she feels this was worsening her nausea  She is still taking dicyclomine as needed as well as MiraLax as well as fiber supplementation twice a day  Patient reports that the once a day MiraLax does not seem to be doing the trick  Patient is going to increase  Patient did stop her probiotic several months ago  Patient denies any alarm symptoms  Patient reports that her schedule of eating is not always on track  We have had a discussion about this in the office today  REVIEW OF SYSTEMS IS OTHERWISE NEGATIVE        Historical Information   Past Medical History:   Diagnosis Date    Anxiety     Concussion     Last Assessed: 11/15/2017    Depression      Past Surgical History:   Procedure Laterality Date    APPENDECTOMY       Social History   Social History     Substance and Sexual Activity   Alcohol Use Yes    Frequency: Monthly or less    Comment: once or twice a year     Social History     Substance and Sexual Activity   Drug Use No     Social History     Tobacco Use   Smoking Status Never Smoker   Smokeless Tobacco Current User   Tobacco Comment    Vape     Family History   Problem Relation Age of Onset  Restless legs syndrome Mother     Depression Mother     Depression Father     Colon cancer Paternal Grandmother     Uterine cancer Paternal [de-identified]     OCD Sister     Anxiety disorder Brother     Hashimoto's thyroiditis Maternal Grandmother     Breast cancer Neg Hx     Ovarian cancer Neg Hx     Cervical cancer Neg Hx        Meds/Allergies       Current Outpatient Medications:     albuterol (PROVENTIL HFA,VENTOLIN HFA) 90 mcg/act inhaler    amitriptyline (ELAVIL) 10 mg tablet    dicyclomine (BENTYL) 20 mg tablet    drospirenone-ethinyl estradiol (BERTRAM) 3-0 02 MG per tablet    venlafaxine (EFFEXOR-XR) 37 5 mg 24 hr capsule    venlafaxine (EFFEXOR-XR) 75 mg 24 hr capsule    famciclovir (FAMVIR) 500 mg tablet    Lidocaine Viscous HCl (XYLOCAINE) 2 % mucosal solution    meloxicam (MOBIC) 15 mg tablet    triamcinolone (KENALOG) 0 1 % oral topical paste    No Known Allergies        Objective     Blood pressure 108/70, pulse (!) 123, height 5' 3" (1 6 m), weight 59 kg (130 lb), not currently breastfeeding  Body mass index is 23 03 kg/m²  PHYSICAL EXAM:      General Appearance:   Alert, cooperative, no distress   HEENT:   Normocephalic, atraumatic, anicteric      Neck:  Supple, symmetrical, trachea midline   Lungs:   Clear to auscultation bilaterally; no rales, rhonchi or wheezing; respirations unlabored    Heart[de-identified]   Regular rate and rhythm; no murmur, rub, or gallop  Abdomen:   Soft, non-tender, non-distended; normal bowel sounds; no masses, no organomegaly    Genitalia:   Deferred    Rectal:   Deferred    Extremities:  No cyanosis, clubbing or edema    Pulses:  2+ and symmetric    Skin:  No jaundice, rashes, or lesions    Lymph nodes:  No palpable cervical lymphadenopathy        Lab Results:   No visits with results within 1 Day(s) from this visit     Latest known visit with results is:   Annual Exam on 02/17/2021   Component Date Value    N gonorrhoeae, DNA Probe 02/17/2021 Negative     Chlamydia trachomatis, D* 02/17/2021 Negative          Radiology Results:   No results found

## 2021-04-01 NOTE — LETTER
April 1, 2021     Julee Pagan, [address] [address]    Patient: Reba Nick   YOB: 2000   Date of Visit: 4/1/2021       Dear Dr Mckeon Shadow: Thank you for referring Angela Cheng to me for evaluation  Below are my notes for this consultation  If you have questions, please do not hesitate to call me  I look forward to following your patient along with you  Sincerely,        Narcisa Rosado PA-C        CC: No Recipients  Seun Bennett  4/1/2021 11:59 AM  Sign when Signing Visit  [facility] - Outpatient Follow-up Note  Reba Nick 24 y o  female MRN: [id number]  Encounter: [id number]          ASSESSMENT AND PLAN:      1  Constipation, unspecified constipation type  2  Abdominal pain, unspecified abdominal location  -Will continue MiraLax but it b i d  dosage     -Will continue dicyclomine 2 to 3 times a day  -Patient will restart probiotic daily     -Will continue fiber supplementation twice a day  -Patient will make some dietary changes     -Will have patient follow up in 3 months   ______________________________________________________________________    SUBJECTIVE:   67-year-old female presents to the office today for the evaluation of abdominal pain and constipation  Patient reports that a month ago she had ECT for Elavil 10 mg because she feels this was worsening her nausea  She is still taking dicyclomine as needed as well as MiraLax as well as fiber supplementation twice a day  Patient reports that the once a day MiraLax does not seem to be doing the trick  Patient is going to increase  Patient did stop her probiotic several months ago  Patient denies any alarm symptoms  Patient reports that her schedule of eating is not always on track  We have had a discussion about this in the office today  REVIEW OF SYSTEMS IS OTHERWISE NEGATIVE        Historical Information   Past Medical History: Diagnosis Date    Anxiety     Concussion     Last Assessed: 11/15/2017    Depression      Past Surgical History:   Procedure Laterality Date    APPENDECTOMY       Social History   Social History     Substance and Sexual Activity   Alcohol Use Yes    Frequency: Monthly or less    Comment: once or twice a year     Social History     Substance and Sexual Activity   Drug Use No     Social History     Tobacco Use   Smoking Status Never Smoker   Smokeless Tobacco Current User   Tobacco Comment    Vape     Family History   Problem Relation Age of Onset    Restless legs syndrome Mother     Depression Mother     Depression Father     Colon cancer Paternal Grandmother     Uterine cancer Paternal Grandmother    Clifm Edgard OCD Sister     Anxiety disorder Brother     Hashimoto's thyroiditis Maternal Grandmother     Breast cancer Neg Hx     Ovarian cancer Neg Hx     Cervical cancer Neg Hx        Meds/Allergies       Current Outpatient Medications:     albuterol (PROVENTIL HFA,VENTOLIN HFA) 90 mcg/act inhaler    amitriptyline (ELAVIL) 10 mg tablet    dicyclomine (BENTYL) 20 mg tablet    drospirenone-ethinyl estradiol (BERTRAM) 3-0 02 MG per tablet    venlafaxine (EFFEXOR-XR) 37 5 mg 24 hr capsule    venlafaxine (EFFEXOR-XR) 75 mg 24 hr capsule    famciclovir (FAMVIR) 500 mg tablet    Lidocaine Viscous HCl (XYLOCAINE) 2 % mucosal solution    meloxicam (MOBIC) 15 mg tablet    triamcinolone (KENALOG) 0 1 % oral topical paste    No Known Allergies        Objective     Blood pressure 108/70, pulse (!) 123, height 5' 3" (1 6 m), weight 59 kg (130 lb), not currently breastfeeding  Body mass index is 23 03 kg/m²        PHYSICAL EXAM:      General Appearance:   Alert, cooperative, no distress   HEENT:   Normocephalic, atraumatic, anicteric      Neck:  Supple, symmetrical, trachea midline   Lungs:   Clear to auscultation bilaterally; no rales, rhonchi or wheezing; respirations unlabored    Heart[de-identified]   Regular rate and rhythm; no murmur, rub, or gallop  Abdomen:   Soft, non-tender, non-distended; normal bowel sounds; no masses, no organomegaly    Genitalia:   Deferred    Rectal:   Deferred    Extremities:  No cyanosis, clubbing or edema    Pulses:  2+ and symmetric    Skin:  No jaundice, rashes, or lesions    Lymph nodes:  No palpable cervical lymphadenopathy        Lab Results:   No visits with results within 1 Day(s) from this visit  Latest known visit with results is:   Annual Exam on 02/17/2021   Component Date Value    N gonorrhoeae, DNA Probe 02/17/2021 Negative     Chlamydia trachomatis, D* 02/17/2021 Negative          Radiology Results:   No results found

## 2021-04-06 DIAGNOSIS — Z23 ENCOUNTER FOR IMMUNIZATION: ICD-10-CM

## 2021-04-07 ENCOUNTER — IMMUNIZATIONS (OUTPATIENT)
Dept: FAMILY MEDICINE CLINIC | Facility: HOSPITAL | Age: 21
End: 2021-04-07

## 2021-04-07 DIAGNOSIS — Z23 ENCOUNTER FOR IMMUNIZATION: Primary | ICD-10-CM

## 2021-04-07 PROCEDURE — 91300 SARS-COV-2 / COVID-19 MRNA VACCINE (PFIZER-BIONTECH) 30 MCG: CPT

## 2021-04-07 PROCEDURE — 0001A SARS-COV-2 / COVID-19 MRNA VACCINE (PFIZER-BIONTECH) 30 MCG: CPT

## 2021-04-12 ENCOUNTER — OFFICE VISIT (OUTPATIENT)
Dept: PSYCHIATRY | Facility: CLINIC | Age: 21
End: 2021-04-12
Payer: COMMERCIAL

## 2021-04-12 DIAGNOSIS — F41.1 GENERALIZED ANXIETY DISORDER: Primary | ICD-10-CM

## 2021-04-12 DIAGNOSIS — F32.1 CURRENT MODERATE EPISODE OF MAJOR DEPRESSIVE DISORDER WITHOUT PRIOR EPISODE (HCC): ICD-10-CM

## 2021-04-12 PROCEDURE — 99214 OFFICE O/P EST MOD 30 MIN: CPT | Performed by: STUDENT IN AN ORGANIZED HEALTH CARE EDUCATION/TRAINING PROGRAM

## 2021-04-12 PROCEDURE — 1036F TOBACCO NON-USER: CPT | Performed by: STUDENT IN AN ORGANIZED HEALTH CARE EDUCATION/TRAINING PROGRAM

## 2021-04-12 PROCEDURE — 90833 PSYTX W PT W E/M 30 MIN: CPT | Performed by: STUDENT IN AN ORGANIZED HEALTH CARE EDUCATION/TRAINING PROGRAM

## 2021-04-12 PROCEDURE — 3725F SCREEN DEPRESSION PERFORMED: CPT | Performed by: STUDENT IN AN ORGANIZED HEALTH CARE EDUCATION/TRAINING PROGRAM

## 2021-04-12 RX ORDER — VENLAFAXINE HYDROCHLORIDE 75 MG/1
75 CAPSULE, EXTENDED RELEASE ORAL DAILY
Qty: 90 CAPSULE | Refills: 1 | Status: SHIPPED | OUTPATIENT
Start: 2021-04-12 | End: 2022-02-02 | Stop reason: SDUPTHER

## 2021-04-12 RX ORDER — VENLAFAXINE HYDROCHLORIDE 37.5 MG/1
37.5 CAPSULE, EXTENDED RELEASE ORAL DAILY
Qty: 90 CAPSULE | Refills: 1 | Status: SHIPPED | OUTPATIENT
Start: 2021-04-12 | End: 2022-02-02

## 2021-04-12 NOTE — PSYCH
Psychiatric Medication Management - Behavioral Health   Julian Pena 24 y o  female MRN: [id number]    Reason for Visit:   Chief Complaint   Patient presents with    Depression    Anxiety     Subjective:  19-11 y/o female, domiciled with boyfriend in apartment attached to parent's home, also has parents, brother and [address] [place] [address] as  about 24 hrs/week, currently attending online college at [address] as full-time student (completing general studies), PPH significant for h/o depression and anxiety, previously in outpatient therapy for a few months, no past psychiatric hospitalizations, 1 self-aborted past suicide attempt (plan to hang self about 6 months ago), no h/o self-injurious behaviors, no h/o physical aggression, no significant PMH, no active substance abuse, [facility] on referral from [employer] concerns about depression and anxiety ,with patient reporting "I think I need help with my self-esteem" and father reporting "I'd like her to feel better about herself, understand that it is okay to feel bad or sad "     On problem-focused interview:  1   MDD- Patient reports that she had a brief trial on Amitriptyline for stomach upset, didn't find much benefit after a few months so was discontinued  She reports that she is trying dietary changes and probiotics to help deal with stomach issues  Patient reports that overall things have been going well  She reports 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, 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  She reports 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  She reports excited to get started  She reports that she is taking her college courses, will be graduating over the summer with an associate's degree  She plans to take a gap year and then return to [employer] in fall 2022    Patient reports that her mood has been "pretty good, stressed "  She reports her mother was recently hospitalized, reports that she has been getting better  She reports handling stressors well  Patient rates her mood about 7/10 happiness  She reports that she is getting along with boyfriend well      2  EMILY- Patient reports that her anxiety has been okay, goes up with stress  She denies any big anxiety attacks  She rates her about anxiety about 4-5/10 intensit yon most days  Review Of Systems:     Constitutional Negative   ENT Mouth ulcers   Cardiovascular Negative   Respiratory Negative   Gastrointestinal Abdominal Pain   Genitourinary Negative   Musculoskeletal Negative   Integumentary Negative   Neurological Negative   Endocrine Negative     Past Medical History:   Patient Active Problem List   Diagnosis    Closed fracture of left side of occipital bone (HCC)    Acne    Chronic vaginitis    Concussion with no loss of consciousness    Post concussion syndrome    Generalized anxiety disorder    Current moderate episode of major depressive disorder without prior episode (HCC)    Chronic pain of both knees    Canker sores oral       Allergies: No Known Allergies    Past Surgical History:   Past Surgical History:   Procedure Laterality Date    APPENDECTOMY         Past Psychiatric History:    H/o depression and anxiety, previously in outpatient therapy for a few months, no past psychiatric hospitalizations, 1 past suicide attempts (plan to hang self about 6 months ago), no h/o self-injurious behaviors, no h/o physical aggression  Previously in outpatient therapy with Dr Gregg Barreto on weekly basis        Past Medication Trials: Zoloft up to 150 mg daily (ineffective after the concussion), Wellbutrin  mg daily (ineffective)     Family Psychiatric History:   Brother- Anxiety   Sister- OCD tendencies  Mother- Depression (Lexapro)  Father- Anger (Effexor XR)  Mat   Side- Depression     No FH of suicide     Social History:   Lives with parents, 2 siblings  Claudio Vital works as a , mother works as a nurse in [employer] system   Firearm in home- denies access to firearm      Substance Abuse:   Vaping- nearly everyday  Cannabis- no use for past 2 years  Alcohol- 1-2 times per year     No cigarette use     Traumatic History: Denies any h/o physical or sexual abuse      The following portions of the patient's history were reviewed and updated as appropriate: allergies, current medications, past family history, past medical history, past social history, past surgical history and problem list     Objective: There were no vitals filed for this visit  Weight (last 2 days)     None          Mental status:  Appearance sitting comfortably in chair, dressed in casual clothing, adequate hygiene and grooming, cooperative with interview, fairly well related   Mood "pretty good, stressed "   Affect Appears generally euthymic, stable, mood-congruent   Speech Normal rate, rhythm, and volume   Thought Processes Linear and goal directed   Associations intact associations   Hallucinations Denies any auditory or visual hallucinations   Thought Content No passive or active suicidal or homicidal ideation, intent, or plan     Orientation Oriented to person, place, time, and situation   Recent and Remote Memory Grossly intact   Attention Span and Concentration Concentration intact   Intellect Appears to be of Average Intelligence   Insight Insight intact   Judgement judgment was intact   Muscle Strength Muscle strength and tone were normal   Language Within normal limits   Fund of Knowledge Age appropriate   Pain None     PHQ-A Depression Screening    Feeling down, depressed, irritable or hopeless: 0 - not at all  Little interest or pleasure in doing things: 1 - several days  Trouble falling or staying asleep, or sleeping too much: 1 - several days  Poor appetite or overeatin - not at all  Feeling tired or having little energy: 1 - several days  Feeling bad about yourself - or that you are a failure or have let yourself or your family down: 0 - not at all  Trouble concentrating on things, such as reading the newspaper or watching television: 0 - not at all  Moving or speaking so slowly that other people could have noticed  Or the opposite - being so fidgety or restless that you have been moving around a lot more than usual: 1 - several days  Thoughts that you would be better off dead, or of hurting yourself in some way: 0 - not at all          EMILY-7 Flowsheet Screening      Most Recent Value   Over the last two weeks, how often have you been bothered by the following problems? Feeling nervous, anxious, or on edge  1   Not being able to stop or control worrying  1   Worrying too much about different things  1   Trouble relaxing   0   Being so restless that it's hard to sit still  2   Becoming easily annoyed or irritable   1   Feeling afraid as if something awful might happen  0   How difficult have these problems made it for you to do your work, take care of things at home, or get along with other people? Somewhat difficult   EMILY Score   6           Assessment/Plan:       Diagnoses and all orders for this visit:    Generalized anxiety disorder  -     venlafaxine (EFFEXOR-XR) 37 5 mg 24 hr capsule; Take 1 capsule (37 5 mg total) by mouth daily  -     venlafaxine (EFFEXOR-XR) 75 mg 24 hr capsule; Take 1 capsule (75 mg total) by mouth daily    Current moderate episode of major depressive disorder without prior episode (HCC)  -     venlafaxine (EFFEXOR-XR) 37 5 mg 24 hr capsule; Take 1 capsule (37 5 mg total) by mouth daily  -     venlafaxine (EFFEXOR-XR) 75 mg 24 hr capsule; Take 1 capsule (75 mg total) by mouth daily          Diagnosis: 1  Major Depressive Disorder- single episode, moderate severity, 2  Generalized Anxiety Disorder, r/o social anxiety disorder, 3   R/o ADHD- inattentive type        Treatment Recommendations:    19-11 y/o female, domiciled with boyfriend in apartment attached to parent's home, also has parents, brother and [address] as  about 24 hrs/week, currently attending online college at [address] as full-time student (completing general studies), PPH significant for h/o depression and anxiety, previously in outpatient therapy for a few months, no past psychiatric hospitalizations, 1 self-aborted past suicide attempt (plan to hang self), no h/o self-injurious behaviors, no h/o physical aggression, no significant PMH, no active substance abuse, presents to [address] [place] [address] outpatient clinic on referral from [employer] concerns about depression and anxiety,with patient reporting "I think I need help with my self-esteem" and father reporting "I'd like her to feel better about herself, understand that it is okay to feel bad or sad "     On assessment today, patient overall continues to do well, some increased stress recently but managing it well, recently passing real estate exam, in psychosocial context of attending community college, recently moving in with boyfriend, high intellectual functioning   No current passive or active SI, intent, or plan        In terms of suicide risk assessment, patient with minimal depressive symptomsI, 1 past suicide attempt; however, patient is currently future-oriented, denies any current passive or active suicidal ideation, intent, or plan, has coping skills, no FH of suicide, no active substance use, no access to firearms, no global insomnia or psychic anxiety   Therefore, despite risk factors, patient is not currently an imminent risk of harm to self or others above chronic baseline risk and is appropriate for outpatient level of care at this time       Plan:  1  MDD- Will continue Effexor  5 mg daily   PHQ-A score of 4, minimal depression (4/12/21)  2  Anxiety- Will continue Effexor XR to continue helping with anxiety symptoms   EMILY-7 score of 6, mild anxiety (4/12/21)  3  Medical- Continue to f/u with GI regarding chronic abdominal issues   F/u with primary care provider for on-going medical care  4  Follow-up with this provider in 4 months  Risks, Benefits And Possible Side Effects Of Medications:  Risks, benefits, and possible side effects of medications explained to patient and family, they verbalize understanding and Reviewed risks/benefits and side effects of antidepressant medications including black box warning on antidepressants, patient and family verbalize understanding

## 2021-04-13 NOTE — BH TREATMENT PLAN
TREATMENT PLAN (Medication Management Only)        [facility]    Name and Date of Birth:  Cheyenne Vickers 24 y o  2000  Date of Treatment Plan: April 12, 2021  Diagnosis/Diagnoses:    1  Generalized anxiety disorder    2  Current moderate episode of major depressive disorder without prior episode [facility])      Strengths/Personal Resources for Self-Care: supportive family, taking medications as prescribed, ability to communicate needs, ability to listen, ability to reason, average or above intelligence  Area/Areas of need (in own words): anxiety symptoms, depressive symptoms  1  Long Term Goal: improve anxiety, improve depression  Target Date: 1 year - 4/12/2022  Person/Persons responsible for completion of goal: BALDEMAR Corcoran   2   Short Term Objective (s) - How will we reach this goal?:   A  Provider new recommended medication/dosage changes and/or continue medication(s): continue current medications as prescribed  Target Date: 3 months - 7/12/2021  Person/Persons Responsible for Completion of Goal: BALDEMAR Corcoran  Progress Towards Goals: continuing treatment  Treatment Modality: medication management every 3 months  Review due 6 months from date of this plan: 6 months - 10/12/2021  Expected length of service: maintenance unless revised  My Physician/PA/NP and I have developed this plan together and I agree to work on the goals and objectives  I understand the treatment goals that were developed for my treatment      Treatment Plan done but not signed at time of office visit due to:  Plan reviewed by phone or in person  and verbal consent given due to [place] social distancing

## 2021-04-28 ENCOUNTER — IMMUNIZATIONS (OUTPATIENT)
Dept: FAMILY MEDICINE CLINIC | Facility: HOSPITAL | Age: 21
End: 2021-04-28

## 2021-04-28 DIAGNOSIS — Z23 ENCOUNTER FOR IMMUNIZATION: Primary | ICD-10-CM

## 2021-04-28 PROCEDURE — 91300 SARS-COV-2 / COVID-19 MRNA VACCINE (PFIZER-BIONTECH) 30 MCG: CPT

## 2021-04-28 PROCEDURE — 0002A SARS-COV-2 / COVID-19 MRNA VACCINE (PFIZER-BIONTECH) 30 MCG: CPT

## 2021-05-13 NOTE — PSYCH
Treatment Plan done but not signed at time of office visit due to:  Plan reviewed by phone or in person  and verbal consent given due to [address] distancing given at  5/22/20 office visit for 12/27/18 plan

## 2021-05-13 NOTE — PSYCH
Treatment Plan not completed within required Limits due to : there was an Epic technology error and Treatment Plan was unable to be signed, will do at next OV

## 2021-05-20 ENCOUNTER — OFFICE VISIT (OUTPATIENT)
Dept: FAMILY MEDICINE CLINIC | Facility: CLINIC | Age: 21
End: 2021-05-20
Payer: COMMERCIAL

## 2021-05-20 VITALS
BODY MASS INDEX: 23.39 KG/M2 | HEART RATE: 114 BPM | OXYGEN SATURATION: 99 % | HEIGHT: 63 IN | WEIGHT: 132 LBS | DIASTOLIC BLOOD PRESSURE: 70 MMHG | RESPIRATION RATE: 14 BRPM | TEMPERATURE: 98.5 F | SYSTOLIC BLOOD PRESSURE: 98 MMHG

## 2021-05-20 DIAGNOSIS — L03.031 PARONYCHIA OF GREAT TOE, RIGHT: Primary | ICD-10-CM

## 2021-05-20 PROCEDURE — 87070 CULTURE OTHR SPECIMN AEROBIC: CPT | Performed by: NURSE PRACTITIONER

## 2021-05-20 PROCEDURE — 1036F TOBACCO NON-USER: CPT | Performed by: NURSE PRACTITIONER

## 2021-05-20 PROCEDURE — 3008F BODY MASS INDEX DOCD: CPT | Performed by: NURSE PRACTITIONER

## 2021-05-20 PROCEDURE — 87205 SMEAR GRAM STAIN: CPT | Performed by: NURSE PRACTITIONER

## 2021-05-20 PROCEDURE — 10140 I&D HMTMA SEROMA/FLUID COLLJ: CPT | Performed by: NURSE PRACTITIONER

## 2021-05-20 PROCEDURE — 87186 SC STD MICRODIL/AGAR DIL: CPT | Performed by: NURSE PRACTITIONER

## 2021-05-20 PROCEDURE — 99213 OFFICE O/P EST LOW 20 MIN: CPT | Performed by: NURSE PRACTITIONER

## 2021-05-20 RX ORDER — FLUCONAZOLE 150 MG/1
TABLET ORAL
COMMUNITY
Start: 2021-04-14 | End: 2022-06-06

## 2021-05-20 RX ORDER — CEPHALEXIN 500 MG/1
500 CAPSULE ORAL EVERY 8 HOURS SCHEDULED
Qty: 21 CAPSULE | Refills: 0 | Status: SHIPPED | OUTPATIENT
Start: 2021-05-20 | End: 2021-05-27

## 2021-05-20 NOTE — PROGRESS NOTES
Assessment/Plan:    Paronychia of great toe, right  I&D performed in office, patient tolerated well  A scant amount of purulent drainage expressed  Wound culture obtained, will follow  To begin Keflex 500 mg every 8 hours  Advised to begin warm soaks 4 to 5 times a day in keep area covered  Follow-up if no improvement in 1 week or sooner if symptoms worsen  Diagnoses and all orders for this visit:    Paronychia of great toe, right  -     Wound culture and Gram stain; Future  -     cephalexin (KEFLEX) 500 mg capsule; Take 1 capsule (500 mg total) by mouth every 8 (eight) hours for 7 days    Other orders  -     fluconazole (DIFLUCAN) 150 mg tablet; take 1 tablet by mouth AS A ONE TIME DOSE repeat in 3 days  -     Incision and Drainage          Subjective:      Patient ID: Joselo Dunn is a 24 y o  female  Brenda  presents reporting redness and discomfort in her right great toe after injuring it at home for 2 weeks  She felt as though her symptoms were initially improving, but worsened again 3-4 days ago after she re-injured her toe  Brenda has been soaking her great toe in Epson salt with some improvement  Denies fever or chills  She is up-to-date on her tetanus        The following portions of the patient's history were reviewed and updated as appropriate:   She   Patient Active Problem List    Diagnosis Date Noted    Paronychia of great toe, right 05/20/2021    Chronic pain of both knees 09/08/2020    Canker sores oral 09/08/2020    Generalized anxiety disorder 04/16/2018    Current moderate episode of major depressive disorder without prior episode ([facility] [address] ) 04/16/2018    Post concussion syndrome 10/12/2017    Concussion with no loss of consciousness 10/03/2017    Closed fracture of left side of occipital bone (HCC) 09/19/2017    Chronic vaginitis 03/15/2017    Acne 01/22/2016     Current Outpatient Medications   Medication Sig Dispense Refill    albuterol (PROVENTIL HFA,VENTOLIN HFA) 90 mcg/act inhaler Inhale 1-2 puffs  0    dicyclomine (BENTYL) 20 mg tablet Take 1 tablet (20 mg total) by mouth every 6 (six) hours 60 tablet 3    drospirenone-ethinyl estradiol (BERTRAM) 3-0 02 MG per tablet Take 1 tablet by mouth daily 28 tablet 12    Lidocaine Viscous HCl (XYLOCAINE) 2 % mucosal solution Swish and spit 15 mL 4 (four) times a day as needed for mouth pain or discomfort 100 mL 0    venlafaxine (EFFEXOR-XR) 37 5 mg 24 hr capsule Take 1 capsule (37 5 mg total) by mouth daily 90 capsule 1    venlafaxine (EFFEXOR-XR) 75 mg 24 hr capsule Take 1 capsule (75 mg total) by mouth daily 90 capsule 1    cephalexin (KEFLEX) 500 mg capsule Take 1 capsule (500 mg total) by mouth every 8 (eight) hours for 7 days 21 capsule 0    fluconazole (DIFLUCAN) 150 mg tablet take 1 tablet by mouth AS A ONE TIME DOSE repeat in 3 days       No current facility-administered medications for this visit  She has No Known Allergies       Review of Systems   Constitutional: Negative  Respiratory: Negative  Cardiovascular: Negative  Skin: Positive for color change  Neurological: Negative  Psychiatric/Behavioral: The patient is nervous/anxious  BP 98/70   Pulse (!) 114   Temp 98 5 °F (36 9 °C) (Tympanic)   Resp 14   Ht 5' 3" (1 6 m)   Wt 59 9 kg (132 lb)   LMP 05/17/2021 (Exact Date)   SpO2 99%   BMI 23 38 kg/m²     Objective:     Physical Exam  Vitals signs and nursing note reviewed  Constitutional:       General: She is not in acute distress  Appearance: Normal appearance  She is well-developed  She is not ill-appearing or toxic-appearing  HENT:      Head: Normocephalic and atraumatic  Eyes:      Conjunctiva/sclera: Conjunctivae normal    Neck:      Musculoskeletal: Neck supple  Cardiovascular:      Rate and Rhythm: Regular rhythm  Tachycardia present  Heart sounds: Normal heart sounds  No murmur     Pulmonary:      Effort: Pulmonary effort is normal  No respiratory distress  Breath sounds: Normal breath sounds  No wheezing or rales  Chest:      Chest wall: No tenderness  Skin:     Findings: Erythema present  Comments:   Presence of erythema, mild swelling and warmth at nail bed of right great toe located laterally  Tender on exam    Neurological:      General: No focal deficit present  Mental Status: She is alert and oriented to person, place, and time  Psychiatric:         Mood and Affect: Mood is anxious  Behavior: Behavior normal          Thought Content: Thought content normal          Judgment: Judgment normal            Incision and Drainage    Date/Time: 5/20/2021 9:11 AM  Performed by: MAYRA Zendejas  Authorized by: MAYRA Zendejas   Universal Protocol:  Consent: Verbal consent obtained  Risks and benefits: risks, benefits and alternatives were discussed  Consent given by: patient  Time out: Immediately prior to procedure a "time out" was called to verify the correct patient, procedure, equipment, support staff and site/side marked as required  Patient understanding: patient states understanding of the procedure being performed  Patient identity confirmed: verbally with patient      Patient location:  Clinic  Location:     Type:  Fluid collection    Location: Right great toe   Pre-procedure details:     Skin preparation:  Betadine  Procedure details:     Complexity:  Simple    Incision types:  Stab incision    Aspiration type: puncture aspiration      Scalpel blade:  11    Approach:  Open    Incision depth:  Dermal    Wound management:  Extensive cleaning    Drainage:  Purulent and bloody    Drainage amount:  Scant    Wound treatment:  Wound left open  Post-procedure details:     Patient tolerance of procedure:   Tolerated well, no immediate complications

## 2021-05-20 NOTE — ASSESSMENT & PLAN NOTE
I&D performed in office, patient tolerated well  A scant amount of purulent drainage expressed  Wound culture obtained, will follow  To begin Keflex 500 mg every 8 hours  Advised to begin warm soaks 4 to 5 times a day in keep area covered  Follow-up if no improvement in 1 week or sooner if symptoms worsen

## 2021-05-22 LAB
BACTERIA WND AEROBE CULT: ABNORMAL
GRAM STN SPEC: ABNORMAL
GRAM STN SPEC: ABNORMAL

## 2021-07-19 ENCOUNTER — TELEPHONE (OUTPATIENT)
Dept: PSYCHIATRY | Facility: CLINIC | Age: 21
End: 2021-07-19

## 2021-07-28 ENCOUNTER — OFFICE VISIT (OUTPATIENT)
Dept: FAMILY MEDICINE CLINIC | Facility: CLINIC | Age: 21
End: 2021-07-28
Payer: COMMERCIAL

## 2021-07-28 VITALS
BODY MASS INDEX: 22.68 KG/M2 | HEIGHT: 63 IN | TEMPERATURE: 98 F | WEIGHT: 128 LBS | DIASTOLIC BLOOD PRESSURE: 64 MMHG | SYSTOLIC BLOOD PRESSURE: 110 MMHG | OXYGEN SATURATION: 98 % | HEART RATE: 74 BPM

## 2021-07-28 DIAGNOSIS — H10.31 ACUTE CONJUNCTIVITIS OF RIGHT EYE, UNSPECIFIED ACUTE CONJUNCTIVITIS TYPE: ICD-10-CM

## 2021-07-28 DIAGNOSIS — R09.81 NASAL CONGESTION: Primary | ICD-10-CM

## 2021-07-28 PROCEDURE — 99213 OFFICE O/P EST LOW 20 MIN: CPT | Performed by: NURSE PRACTITIONER

## 2021-07-28 PROCEDURE — 3008F BODY MASS INDEX DOCD: CPT | Performed by: NURSE PRACTITIONER

## 2021-07-28 PROCEDURE — 1036F TOBACCO NON-USER: CPT | Performed by: NURSE PRACTITIONER

## 2021-07-28 RX ORDER — FLUTICASONE PROPIONATE 50 MCG
1 SPRAY, SUSPENSION (ML) NASAL DAILY
Qty: 1 ML | Refills: 1 | Status: SHIPPED | OUTPATIENT
Start: 2021-07-28 | End: 2022-03-24 | Stop reason: ALTCHOICE

## 2021-07-28 RX ORDER — POLYMYXIN B SULFATE AND TRIMETHOPRIM 1; 10000 MG/ML; [USP'U]/ML
1 SOLUTION OPHTHALMIC EVERY 4 HOURS
Qty: 10 ML | Refills: 0 | Status: SHIPPED | OUTPATIENT
Start: 2021-07-28 | End: 2022-03-24 | Stop reason: ALTCHOICE

## 2021-07-28 NOTE — PROGRESS NOTES
Assessment/Plan:    Tobacco Cessation Counseling: Tobacco cessation counseling was provided  The patient is sincerely urged to quit consumption of tobacco  She is not ready to quit tobacco      Nasal congestion    Discussed  Sinus congestion  Flonase ordered  Increase fluid hydration  Acute conjunctivitis of right eye    Discussed viral conjunctivitis  Advised not to touch eyes with hands  Wash hands frequently  Polytrim ordered  Problem List Items Addressed This Visit        Other    Acute conjunctivitis of right eye       Discussed viral conjunctivitis  Advised not to touch eyes with hands  Wash hands frequently  Polytrim ordered  Relevant Medications    polymyxin b-trimethoprim (POLYTRIM) ophthalmic solution    Nasal congestion - Primary       Discussed  Sinus congestion  Flonase ordered  Increase fluid hydration  Relevant Medications    fluticasone (FLONASE) 50 mcg/act nasal spray            Subjective:      Patient ID: Kendall Adame is a 22 y o  female  Patient is here with complaints of right eye swelling, throat congestion, sinus pain pressure  Symptoms started last night  Denies any fevers or chills  Denies any trouble swallowing  Denies any cough  The following portions of the patient's history were reviewed and updated as appropriate: allergies, current medications, past family history, past medical history, past social history, past surgical history and problem list     Review of Systems   Constitutional: Negative  Negative for fatigue and fever  HENT: Positive for postnasal drip, sinus pressure and sinus pain  Eyes: Positive for discharge, redness and itching  Negative for photophobia, pain and visual disturbance  Respiratory: Negative  Cardiovascular: Negative  Gastrointestinal: Negative  Endocrine: Negative  Genitourinary: Negative  Musculoskeletal: Negative  Skin: Negative  Allergic/Immunologic: Negative  Neurological: Negative  Psychiatric/Behavioral: Negative  Objective:      /64   Pulse 74   Temp 98 °F (36 7 °C)   Ht 5' 3" (1 6 m)   Wt 58 1 kg (128 lb)   SpO2 98%   BMI 22 67 kg/m²          Physical Exam  Vitals and nursing note reviewed  Constitutional:       Appearance: She is well-developed  HENT:      Head: Normocephalic and atraumatic  Right Ear: External ear normal  Tenderness present  Left Ear: External ear normal       Nose: Nasal tenderness and congestion present  Right Sinus: Frontal sinus tenderness present  Eyes:      Extraocular Movements:      Right eye: Normal extraocular motion and no nystagmus  Left eye: Normal extraocular motion and no nystagmus  Conjunctiva/sclera:      Right eye: Right conjunctiva is injected  Exudate present  Pupils: Pupils are equal, round, and reactive to light  Cardiovascular:      Rate and Rhythm: Normal rate and regular rhythm  Pulmonary:      Effort: Pulmonary effort is normal    Abdominal:      General: Bowel sounds are normal       Palpations: Abdomen is soft  Musculoskeletal:         General: Normal range of motion  Cervical back: Normal range of motion  Skin:     General: Skin is warm and dry  Neurological:      Mental Status: She is alert and oriented to person, place, and time             Labs:    Lab Results   Component Value Date    WBC 5 55 07/27/2020    HGB 12 8 07/27/2020    HCT 39 9 07/27/2020    MCV 85 07/27/2020     07/27/2020     Lab Results   Component Value Date    K 4 7 07/27/2020     07/27/2020    CO2 26 07/27/2020    BUN 7 07/27/2020    CREATININE 0 77 07/27/2020    GLUF 87 07/27/2020    CALCIUM 9 0 07/27/2020    AST 16 07/27/2020    ALT 13 07/27/2020    ALKPHOS 99 07/27/2020    EGFR 112 07/27/2020     Lab Results   Component Value Date    CALCIUM 9 0 07/27/2020    K 4 7 07/27/2020    CO2 26 07/27/2020     07/27/2020    BUN 7 07/27/2020    CREATININE 0 77 07/27/2020

## 2021-07-28 NOTE — ASSESSMENT & PLAN NOTE
Discussed viral conjunctivitis  Advised not to touch eyes with hands  Wash hands frequently  Polytrim ordered

## 2021-07-28 NOTE — PATIENT INSTRUCTIONS
Flonase daily  Use eye drops every 4 hrs x 4 days    Conjunctivitis   WHAT YOU SHOULD KNOW:   Conjunctivitis, or pink eye, is inflammation of your conjunctiva  The conjunctiva is a thin tissue that covers the front of your eye and the back of your eyelids  The conjunctiva helps protect your eye and keep it moist         INSTRUCTIONS:   Medicines:   · Allergy medicine: This medicine helps decrease itchy, red, swollen eyes caused by allergies  It may be given as a pill, eye drops, or nasal spray  · Antibiotics:  You will need antibiotics if your conjunctivitis is caused by bacteria  This medicine may be given as eye drops or eye ointment  · Steroid medicine: This medicine helps decrease inflammation  It may be given as a pill, eye drops, or nasal spray  · Take your medicine as directed  Call your healthcare provider if you think your medicine is not helping or if you have side effects  Tell him if you are allergic to any medicine  Keep a list of the medicines, vitamins, and herbs you take  Include the amounts, and when and why you take them  Bring the list or the pill bottles to follow-up visits  Carry your medicine list with you in case of an emergency  Follow up with your primary healthcare provider as directed: You may need to return for more tests on your eyes  These will help your primary healthcare provider check for eye damage  Write down your questions so you remember to ask them during your visits  Avoid the spread of conjunctivitis:   · Wash your hands often:  Wash your hands before you touch your eyes  Also wash your hands before you prepare or eat food and after you use the bathroom or change a diaper  · Avoid allergens:  Try to avoid the things that cause your allergies, such as pets, dust, or grass  · Avoid contact:  Do not share towels or washcloths  Try to stay away from others as much as possible  Ask when you can return to work or school       · Throw away eye makeup:  Throw away mascara and other eye makeup  Manage your symptoms:  · Apply a cool compress:  Wet a washcloth with cold water and place it on your eye  This will help decrease swelling  · Use eye drops:  Eye drops, or artificial tears, can be bought without a doctor's order  They help keep your eye moist     · Do not wear contact lenses: They can irritate your eye  Throw away the pair you are using and ask when you can wear them again  Use a new pair of lenses when your primary healthcare provider says it is okay  · Flush your eye:  You may need to flush your eye with saline to help decrease your symptoms  Ask for more information on how to flush your eye  Contact your primary healthcare provider if:   · Your eyesight becomes blurry  · You have tiny bumps or spots of blood on your eye  · You have questions or concerns about your condition or care  Return to the emergency department if:   · The swelling in your eye gets worse, even after treatment  · Your vision suddenly becomes worse or you cannot see at all  · Your eye begins to bleed  © 2014 [address] is for End User's use only and may not be sold, redistributed or otherwise used for commercial purposes  All illustrations and images included in CareNotes® are the copyrighted property of [employer]  or [employer]el Clark  The above information is an  only  It is not intended as medical advice for individual conditions or treatments  Talk to your doctor, nurse or pharmacist before following any medical regimen to see if it is safe and effective for you

## 2021-11-01 ENCOUNTER — OFFICE VISIT (OUTPATIENT)
Dept: PSYCHIATRY | Facility: CLINIC | Age: 21
End: 2021-11-01
Payer: COMMERCIAL

## 2021-11-01 VITALS
HEIGHT: 64 IN | DIASTOLIC BLOOD PRESSURE: 77 MMHG | WEIGHT: 130.8 LBS | SYSTOLIC BLOOD PRESSURE: 117 MMHG | BODY MASS INDEX: 22.33 KG/M2 | HEART RATE: 94 BPM

## 2021-11-01 DIAGNOSIS — F33.9 DEPRESSION, RECURRENT (HCC): ICD-10-CM

## 2021-11-01 DIAGNOSIS — F41.1 GENERALIZED ANXIETY DISORDER: ICD-10-CM

## 2021-11-01 DIAGNOSIS — F32.1 CURRENT MODERATE EPISODE OF MAJOR DEPRESSIVE DISORDER WITHOUT PRIOR EPISODE (HCC): Primary | ICD-10-CM

## 2021-11-01 PROCEDURE — 3008F BODY MASS INDEX DOCD: CPT | Performed by: STUDENT IN AN ORGANIZED HEALTH CARE EDUCATION/TRAINING PROGRAM

## 2021-11-01 PROCEDURE — 3725F SCREEN DEPRESSION PERFORMED: CPT | Performed by: STUDENT IN AN ORGANIZED HEALTH CARE EDUCATION/TRAINING PROGRAM

## 2021-11-01 PROCEDURE — 99214 OFFICE O/P EST MOD 30 MIN: CPT | Performed by: STUDENT IN AN ORGANIZED HEALTH CARE EDUCATION/TRAINING PROGRAM

## 2021-11-01 PROCEDURE — 90833 PSYTX W PT W E/M 30 MIN: CPT | Performed by: STUDENT IN AN ORGANIZED HEALTH CARE EDUCATION/TRAINING PROGRAM

## 2021-11-01 PROCEDURE — 1036F TOBACCO NON-USER: CPT | Performed by: STUDENT IN AN ORGANIZED HEALTH CARE EDUCATION/TRAINING PROGRAM

## 2021-11-01 RX ORDER — HYDROXYZINE HYDROCHLORIDE 25 MG/1
25 TABLET, FILM COATED ORAL
Qty: 30 TABLET | Refills: 1
Start: 2021-11-01 | End: 2022-02-02 | Stop reason: SDUPTHER

## 2021-11-01 RX ORDER — HYDROXYZINE HYDROCHLORIDE 25 MG/1
25 TABLET, FILM COATED ORAL EVERY 6 HOURS PRN
Qty: 30 TABLET | Refills: 1
Start: 2021-11-01 | End: 2021-11-01 | Stop reason: SDUPTHER

## 2021-12-08 DIAGNOSIS — R19.8 CHANGE IN BOWEL FUNCTION: ICD-10-CM

## 2021-12-09 RX ORDER — DICYCLOMINE HCL 20 MG
20 TABLET ORAL EVERY 6 HOURS
Qty: 60 TABLET | Refills: 0 | Status: SHIPPED | OUTPATIENT
Start: 2021-12-09

## 2022-02-02 ENCOUNTER — OFFICE VISIT (OUTPATIENT)
Dept: PSYCHIATRY | Facility: CLINIC | Age: 22
End: 2022-02-02
Payer: COMMERCIAL

## 2022-02-02 VITALS
WEIGHT: 126.2 LBS | SYSTOLIC BLOOD PRESSURE: 110 MMHG | BODY MASS INDEX: 22.36 KG/M2 | HEART RATE: 79 BPM | HEIGHT: 63 IN | DIASTOLIC BLOOD PRESSURE: 75 MMHG

## 2022-02-02 DIAGNOSIS — F41.1 GENERALIZED ANXIETY DISORDER: ICD-10-CM

## 2022-02-02 DIAGNOSIS — F32.1 CURRENT MODERATE EPISODE OF MAJOR DEPRESSIVE DISORDER WITHOUT PRIOR EPISODE (HCC): Primary | ICD-10-CM

## 2022-02-02 PROBLEM — F33.9 DEPRESSION, RECURRENT (HCC): Status: RESOLVED | Noted: 2021-11-01 | Resolved: 2022-02-02

## 2022-02-02 PROCEDURE — 99214 OFFICE O/P EST MOD 30 MIN: CPT | Performed by: STUDENT IN AN ORGANIZED HEALTH CARE EDUCATION/TRAINING PROGRAM

## 2022-02-02 PROCEDURE — 3008F BODY MASS INDEX DOCD: CPT | Performed by: STUDENT IN AN ORGANIZED HEALTH CARE EDUCATION/TRAINING PROGRAM

## 2022-02-02 PROCEDURE — 90833 PSYTX W PT W E/M 30 MIN: CPT | Performed by: STUDENT IN AN ORGANIZED HEALTH CARE EDUCATION/TRAINING PROGRAM

## 2022-02-02 PROCEDURE — 1036F TOBACCO NON-USER: CPT | Performed by: STUDENT IN AN ORGANIZED HEALTH CARE EDUCATION/TRAINING PROGRAM

## 2022-02-02 RX ORDER — VENLAFAXINE HYDROCHLORIDE 75 MG/1
75 CAPSULE, EXTENDED RELEASE ORAL DAILY
Qty: 90 CAPSULE | Refills: 1 | Status: SHIPPED | OUTPATIENT
Start: 2022-02-02 | End: 2022-04-22 | Stop reason: SDUPTHER

## 2022-02-02 RX ORDER — HYDROXYZINE HYDROCHLORIDE 25 MG/1
25 TABLET, FILM COATED ORAL
Qty: 30 TABLET | Refills: 1
Start: 2022-02-02 | End: 2022-04-27 | Stop reason: ALTCHOICE

## 2022-02-02 NOTE — PSYCH
Psychiatric Medication Management - Behavioral Health   Ana Maria Robles 22 y o  female MRN: [id number]    Reason for Visit:   Chief Complaint   Patient presents with    Depression    Anxiety       Subjective:  20-8 y/o female, domiciled with boyfriend in home in [place], working full-time as a - in process of ending job, completed an [employer]- currently enrolled full-time at [employer] (studying English), PPH significant for h/o depression and anxiety, previously in outpatient therapy for a few months, no past psychiatric hospitalizations, 1 self-aborted past suicide attempt (plan to hang self about 6 months ago), no h/o self-injurious behaviors, no h/o physical aggression, no significant PMH, no active substance abuse, presents to [address] [place] [address] outpatient clinic on referral from PCP for concerns about depression and anxiety, with patient reporting "I think I need help with my self-esteem" and father reporting "I'd like her to feel better about herself, understand that it is okay to feel bad or sad "     On problem-focused interview:  1   MDD-  She reports that she started as a full-time student this semester majoring in [employer] to obtain her bachelor's degree, a hybrid style of virtual and in-person classes  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, 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  She reports that she is looking for a job to help with her financial situation  She reports that she feels more settled now in her life allow looking for a job now  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  Patient reports her appetite has been pretty stable  She reports her mood has been "better, less stressed "  She reports her focus in school has been good  She reports that she may zone out at times but generally is present  She reports that her grades have been good in her classes    Patient denies any passive or active suicidal ideation, intent, or plan  She reports that she takes the Hydroxyzine at times, reports that she has night sweats in the evenings        2  EMILY- She reports her anxiety has been a bit better since she has gotten through some of her life stressors and has a new plan going forward  She reports that she tends to have anticipatory anxiety with different situations  She reports that her sleep has been a bit better recently, reports less ruminating thoughts  Review Of Systems:     Constitutional Negative   ENT Dry mouth   Cardiovascular Negative   Respiratory Negative   Gastrointestinal Negative   Genitourinary Negative   Musculoskeletal Negative   Integumentary Negative   Neurological Negative   Endocrine Excessive Sweating     Past Medical History:   Patient Active Problem List   Diagnosis    Closed fracture of left side of occipital bone (HCC)    Acne    Chronic vaginitis    Concussion with no loss of consciousness    Post concussion syndrome    Generalized anxiety disorder    Current moderate episode of major depressive disorder without prior episode (HCC)    Chronic pain of both knees    Canker sores oral    Paronychia of great toe, right    Acute conjunctivitis of right eye    Nasal congestion       Allergies: No Known Allergies    Past Surgical History:   Past Surgical History:   Procedure Laterality Date    APPENDECTOMY         Past Psychiatric History:    H/o depression and anxiety, previously in outpatient therapy for a few months, no past psychiatric hospitalizations, 1 past suicide attempts (plan to hang self about 6 months ago), no h/o self-injurious behaviors, no h/o physical aggression  Previously in outpatient therapy with Dr Deepthi Mukherjee on weekly basis        Past Medication Trials: Zoloft up to 150 mg daily (ineffective after the concussion), Wellbutrin  mg daily (ineffective)     Family Psychiatric History:   Brother- Anxiety   Sister- OCD tendencies  Mother- Depression (Lexapro)  Father- Anger (Effexor XR)  Mat  Side- Depression     No FH of suicide     Social History:   Lives with parents, 2 siblings  Jomar Thompson works as a , mother works as a nurse in health system   Firearm in home- denies access to firearm      Substance Abuse:   Vaping- nearly everyday, quit in 8/2021  Cannabis- no use for past 2 years  Alcohol- 1-2 times per year     No cigarette use     Traumatic History: Denies any h/o physical or sexual abuse  The following portions of the patient's history were reviewed and updated as appropriate: allergies, current medications, past family history, past medical history, past social history, past surgical history and problem list     Objective:  Vitals:    02/02/22 1108   BP: 110/75   Pulse: 79     Height: 5' 2 7" (159 3 cm)   Weight (last 2 days)     Date/Time Weight    02/02/22 1108 57 2 (126 2)          Mental status:  Appearance sitting comfortably in chair, dressed in casual clothing, adequate hygiene and grooming, cooperative with interview, fairly well related   Mood "better, less stressed "   Affect Appears generally euthymic, stable, mood-congruent   Speech Normal rate, rhythm, and volume   Thought Processes Linear and goal directed   Associations intact associations   Hallucinations Denies any auditory or visual hallucinations   Thought Content No passive or active suicidal or homicidal ideation, intent, or plan     Orientation Oriented to person, place, time, and situation   Recent and Remote Memory Grossly intact   Attention Span and Concentration Concentration intact   Intellect Appears to be of Average Intelligence   Insight Insight intact   Judgement judgment was intact   Muscle Strength Muscle strength and tone were normal   Language Within normal limits   Fund of Knowledge Age appropriate   Pain None     PHQ-A Depression Screening    Feeling down, depressed, irritable or hopeless: 0 - not at all  Little interest or pleasure in doing things: 0 - not at all  Trouble falling or staying asleep, or sleeping too much: 1 - several days  Poor appetite or overeatin - not at all  Feeling tired or having little energy: 1 - several days  Feeling bad about yourself - or that you are a failure or have let yourself or your family down: 0 - not at all  Trouble concentrating on things, such as reading the newspaper or watching television: 1 - several days  Moving or speaking so slowly that other people could have noticed  Or the opposite - being so fidgety or restless that you have been moving around a lot more than usual: 1 - several days  Thoughts that you would be better off dead, or of hurting yourself in some way: 0 - not at all            EMILY-7 Flowsheet Screening      Most Recent Value   Over the last 2 weeks, how often have you been bothered by any of the following problems? Feeling nervous, anxious, or on edge 1   Not being able to stop or control worrying 3   Worrying too much about different things 3   Trouble relaxing 0   Being so restless that it is hard to sit still 2   Becoming easily annoyed or irritable 1   Feeling afraid as if something awful might happen 1   EMILY-7 Total Score 11           Assessment/Plan:       Diagnoses and all orders for this visit:    Current moderate episode of major depressive disorder without prior episode (HCC)  -     venlafaxine (EFFEXOR-XR) 75 mg 24 hr capsule; Take 1 capsule (75 mg total) by mouth daily    Generalized anxiety disorder  -     hydrOXYzine HCL (ATARAX) 25 mg tablet; Take 1 tablet (25 mg total) by mouth daily at bedtime as needed for anxiety  -     venlafaxine (EFFEXOR-XR) 75 mg 24 hr capsule; Take 1 capsule (75 mg total) by mouth daily          Diagnosis: 1  Major Depressive Disorder- single episode, moderate severity, 2  Generalized Anxiety Disorder, r/o social anxiety disorder, 3   R/o ADHD- inattentive type        Treatment Recommendations:    20-6 y/o female, domiciled with boyfriend in home in [place], working full-time as a , completed an associate's degree at [address]- deferred acceptance at [employer] for fall 2022 semester, , PPH significant for h/o depression and anxiety, previously in outpatient therapy for a few months, no past psychiatric hospitalizations, 1 self-aborted past suicide attempt (plan to hang self), no h/o self-injurious behaviors, no h/o physical aggression, no significant PMH, no active substance abuse, [facility] on referral from PCP for concerns about depression and anxiety,with patient reporting "I think I need help with my self-esteem" and father reporting "I'd like her to feel better about herself, understand that it is okay to feel bad or sad "     On assessment today, patient overall feeling 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, continues to have mild-moderate anxiety symptoms, minimal depressive symptoms, some concerns about dry mouth and night sweats over past year, in psychosocial context of attending community college, recently moving in with boyfriend, high intellectual functioning   No current passive or active SI, intent, or plan        In terms of suicide risk assessment, patient with minimal depressive symptoms, 1 past suicide attempt; however, patient is currently future-oriented, denies any current passive or active suicidal ideation, intent, or plan, has coping skills, no FH of suicide, no active substance use, no access to firearms, no global insomnia or psychic anxiety   Therefore, despite risk factors, patient is not currently an imminent risk of harm to self or others above chronic baseline risk and is appropriate for outpatient level of care at this time       Plan:  1  MDD- Will taper Effexor XR to 75 mg daily given concerns about night sweats, dry mouth, monitor symptoms at lower dosage  PHQ-A score of 4, minimal depression (2/2/22)  2   Anxiety- Will continue Effexor XR to continue helping with anxiety symptoms  Will continue Hydroxyzine 25 mg qhs prn sleep, anxiety  EMILY-7 score of 11, moderate anxiety (2/2/22)  3  Medical- Continue to f/u with GI regarding chronic abdominal issues   F/u with primary care provider for on-going medical care  4  Follow-up with this provider in 3 months  Risks, Benefits And Possible Side Effects Of Medications:  Risks, benefits, and possible side effects of medications explained to patient and family, they verbalize understanding and Reviewed risks/benefits and side effects of antidepressant medications including black box warning on antidepressants, patient and family verbalize understanding  Psychotherapy Provided: Supportive psychotherapy provided  Counseling was provided during the session today for 16 minutes  Medications, treatment progress and treatment plan reviewed with Brenda  Recent stressor including family issues, school stress, social difficulties, everyday stressors and ongoing anxiety discussed with Brenda  Coping strategies including getting into a good routine, improving self-esteem, stress reduction, spending time with family and spending time with friends reviewed with Brenda  Reassurance and supportive therapy provided

## 2022-02-02 NOTE — BH TREATMENT PLAN
TREATMENT PLAN (Medication Management Only)        [facility]    Name and Date of Birth:  Jigna Prajapati 22 y o  2000  Date of Treatment Plan: February 2, 2022  Diagnosis/Diagnoses:    1  Current moderate episode of major depressive disorder without prior episode ([address] )    2  Generalized anxiety disorder      Strengths/Personal Resources for Self-Care: supportive family, taking medications as prescribed, ability to communicate needs, ability to listen, ability to reason  Area/Areas of need (in own words): anxiety symptoms, depressive symptoms  1  Long Term Goal: improve anxiety, improve depression  Target Date: 1 year - 2/2/2023  Person/Persons responsible for completion of goal: BALDEMAR Ruiz   2   Short Term Objective (s) - How will we reach this goal?:   A  Provider new recommended medication/dosage changes and/or continue medication(s): continue current medications as prescribed  B   Continue working on coping skills  Target Date: 3 months - 5/2/2022  Person/Persons Responsible for Completion of Goal: BALDEMAR Ruiz  Progress Towards Goals: continuing treatment  Treatment Modality: medication management every 3 months  Review due 6 months from date of this plan: 6 months - 8/2/2022  Expected length of service: maintenance unless revised  My Physician/PA/NP and I have developed this plan together and I agree to work on the goals and objectives  I understand the treatment goals that were developed for my treatment      Treatment Plan done but not signed at time of office visit due to:  Plan reviewed by phone or in person  and verbal consent given due to [place] social distancing

## 2022-03-21 DIAGNOSIS — Z30.41 ENCOUNTER FOR BIRTH CONTROL PILLS MAINTENANCE: ICD-10-CM

## 2022-03-21 RX ORDER — DROSPIRENONE AND ETHINYL ESTRADIOL 0.02-3(28)
1 KIT ORAL DAILY
Qty: 28 TABLET | Refills: 0 | Status: SHIPPED | OUTPATIENT
Start: 2022-03-21 | End: 2022-04-19

## 2022-03-24 ENCOUNTER — OFFICE VISIT (OUTPATIENT)
Dept: FAMILY MEDICINE CLINIC | Facility: CLINIC | Age: 22
End: 2022-03-24
Payer: COMMERCIAL

## 2022-03-24 VITALS
HEIGHT: 63 IN | SYSTOLIC BLOOD PRESSURE: 120 MMHG | WEIGHT: 121 LBS | BODY MASS INDEX: 21.44 KG/M2 | DIASTOLIC BLOOD PRESSURE: 70 MMHG | TEMPERATURE: 97.8 F | OXYGEN SATURATION: 98 % | HEART RATE: 104 BPM

## 2022-03-24 DIAGNOSIS — R00.0 TACHYCARDIA: ICD-10-CM

## 2022-03-24 DIAGNOSIS — B35.3 TINEA PEDIS OF BOTH FEET: Primary | ICD-10-CM

## 2022-03-24 PROBLEM — S06.0X0A CONCUSSION WITH NO LOSS OF CONSCIOUSNESS: Status: RESOLVED | Noted: 2017-10-03 | Resolved: 2022-03-24

## 2022-03-24 PROBLEM — F07.81 POST CONCUSSION SYNDROME: Status: RESOLVED | Noted: 2017-10-12 | Resolved: 2022-03-24

## 2022-03-24 PROBLEM — H10.31 ACUTE CONJUNCTIVITIS OF RIGHT EYE: Status: RESOLVED | Noted: 2021-07-28 | Resolved: 2022-03-24

## 2022-03-24 PROBLEM — R09.81 NASAL CONGESTION: Status: RESOLVED | Noted: 2021-07-28 | Resolved: 2022-03-24

## 2022-03-24 PROBLEM — L03.031 PARONYCHIA OF GREAT TOE, RIGHT: Status: RESOLVED | Noted: 2021-05-20 | Resolved: 2022-03-24

## 2022-03-24 PROCEDURE — 99214 OFFICE O/P EST MOD 30 MIN: CPT | Performed by: NURSE PRACTITIONER

## 2022-03-24 PROCEDURE — 93000 ELECTROCARDIOGRAM COMPLETE: CPT | Performed by: NURSE PRACTITIONER

## 2022-03-24 PROCEDURE — 1036F TOBACCO NON-USER: CPT | Performed by: NURSE PRACTITIONER

## 2022-03-24 PROCEDURE — 3008F BODY MASS INDEX DOCD: CPT | Performed by: NURSE PRACTITIONER

## 2022-03-24 RX ORDER — NYSTATIN 100000 U/G
OINTMENT TOPICAL 2 TIMES DAILY
Qty: 45 G | Refills: 0 | Status: SHIPPED | OUTPATIENT
Start: 2022-03-24

## 2022-03-24 NOTE — PROGRESS NOTES
Assessment/Plan:    Tinea pedis of both feet  Advised to begin nystatin every 12 hours x4 weeks  Counseled on keeping toes clean and dry  Follow-up if no improvement in 1 month  Tachycardia  ECG showed mild tachycardia,   Otherwise normal sinus rhythm  No ectopy  To obtain labs, will call with results  Diagnoses and all orders for this visit:    Tinea pedis of both feet  -     nystatin (MYCOSTATIN) ointment; Apply topically 2 (two) times a day    Tachycardia  -     POCT ECG  -     CBC and differential; Future  -     Comprehensive metabolic panel; Future  -     TSH, 3rd generation with Free T4 reflex; Future  -     T4, free; Future          Subjective:      Patient ID: Bret Miller is a 22 y o  female  Brenda presents reporting a pruritic rash on her feet for the past 3 months  She used a topical over-the-counter antifungal cream for about a month with minimal improvement  Denies new personal care products, new medications, or new foods  Rash  This is a chronic problem  The current episode started more than 1 month ago  The problem has been waxing and waning since onset  The affected locations include the left fingers, left toes and right toes  The rash is characterized by redness, itchiness and scaling  Pertinent negatives include no anorexia, congestion, cough, diarrhea, eye pain, facial edema, fatigue, fever, joint pain, nail changes, rhinorrhea, shortness of breath, sore throat or vomiting         The following portions of the patient's history were reviewed and updated as appropriate: She   Patient Active Problem List    Diagnosis Date Noted    Tinea pedis of both feet 03/24/2022    Tachycardia 03/24/2022    Chronic pain of both knees 09/08/2020    Canker sores oral 09/08/2020    Generalized anxiety disorder 04/16/2018    Current moderate episode of major depressive disorder without prior episode ([facility] [address] ) 04/16/2018    Closed fracture of left side of occipital bone ([facility] [address] ) 09/19/2017    Chronic vaginitis 03/15/2017    Acne 01/22/2016     Current Outpatient Medications   Medication Sig Dispense Refill    albuterol (PROVENTIL HFA,VENTOLIN HFA) 90 mcg/act inhaler Inhale 1-2 puffs  0    dicyclomine (BENTYL) 20 mg tablet Take 1 tablet (20 mg total) by mouth every 6 (six) hours 60 tablet 0    drospirenone-ethinyl estradiol (BERTRAM) 3-0 02 MG per tablet take 1 tablet by mouth daily 28 tablet 0    fluconazole (DIFLUCAN) 150 mg tablet take 1 tablet by mouth AS A ONE TIME DOSE repeat in 3 days      hydrOXYzine HCL (ATARAX) 25 mg tablet Take 1 tablet (25 mg total) by mouth daily at bedtime as needed for anxiety 30 tablet 1    nystatin (MYCOSTATIN) ointment Apply topically 2 (two) times a day 45 g 0    venlafaxine (EFFEXOR-XR) 75 mg 24 hr capsule Take 1 capsule (75 mg total) by mouth daily 90 capsule 1     No current facility-administered medications for this visit  She has No Known Allergies       Review of Systems   Constitutional: Negative for fatigue and fever  HENT: Negative for congestion, rhinorrhea and sore throat  Eyes: Negative for pain  Respiratory: Negative for cough and shortness of breath  Gastrointestinal: Negative for anorexia, diarrhea and vomiting  Musculoskeletal: Negative for joint pain  Skin: Positive for rash  Negative for nail changes  Neurological: Negative  Psychiatric/Behavioral: Negative  /70   Pulse 104   Temp 97 8 °F (36 6 °C)   Ht 5' 3" (1 6 m)   Wt 54 9 kg (121 lb)   SpO2 98%   BMI 21 43 kg/m²     Objective:     Physical Exam  Vitals and nursing note reviewed  Constitutional:       General: She is not in acute distress  Appearance: Normal appearance  She is well-developed  She is not ill-appearing, toxic-appearing or diaphoretic  HENT:      Head: Normocephalic and atraumatic  Eyes:      Conjunctiva/sclera: Conjunctivae normal    Cardiovascular:      Rate and Rhythm: Regular rhythm        Heart sounds: Normal heart sounds  No murmur heard  Comments: Mild tachycardia  Pulmonary:      Effort: Pulmonary effort is normal  No respiratory distress  Breath sounds: Normal breath sounds  No wheezing or rales  Chest:      Chest wall: No tenderness  Musculoskeletal:      Cervical back: Neck supple  Neurological:      General: No focal deficit present  Mental Status: She is alert and oriented to person, place, and time  Psychiatric:         Mood and Affect: Mood normal          Behavior: Behavior normal          Thought Content:  Thought content normal          Judgment: Judgment normal

## 2022-03-24 NOTE — ASSESSMENT & PLAN NOTE
Advised to begin nystatin every 12 hours x4 weeks  Counseled on keeping toes clean and dry  Follow-up if no improvement in 1 month

## 2022-03-24 NOTE — ASSESSMENT & PLAN NOTE
ECG showed mild tachycardia,   Otherwise normal sinus rhythm  No ectopy  To obtain labs, will call with results

## 2022-03-29 ENCOUNTER — APPOINTMENT (OUTPATIENT)
Dept: LAB | Facility: CLINIC | Age: 22
End: 2022-03-29
Payer: COMMERCIAL

## 2022-04-15 DIAGNOSIS — Z30.41 ENCOUNTER FOR BIRTH CONTROL PILLS MAINTENANCE: ICD-10-CM

## 2022-04-19 RX ORDER — DROSPIRENONE AND ETHINYL ESTRADIOL 0.02-3(28)
1 KIT ORAL DAILY
Qty: 28 TABLET | Refills: 0 | Status: SHIPPED | OUTPATIENT
Start: 2022-04-19 | End: 2022-04-27 | Stop reason: SDUPTHER

## 2022-04-22 ENCOUNTER — OFFICE VISIT (OUTPATIENT)
Dept: PSYCHIATRY | Facility: CLINIC | Age: 22
End: 2022-04-22
Payer: COMMERCIAL

## 2022-04-22 VITALS
HEART RATE: 105 BPM | SYSTOLIC BLOOD PRESSURE: 103 MMHG | WEIGHT: 125.4 LBS | DIASTOLIC BLOOD PRESSURE: 68 MMHG | BODY MASS INDEX: 22.21 KG/M2

## 2022-04-22 DIAGNOSIS — F32.1 CURRENT MODERATE EPISODE OF MAJOR DEPRESSIVE DISORDER WITHOUT PRIOR EPISODE (HCC): Primary | ICD-10-CM

## 2022-04-22 DIAGNOSIS — F41.1 GENERALIZED ANXIETY DISORDER: ICD-10-CM

## 2022-04-22 PROCEDURE — 1036F TOBACCO NON-USER: CPT | Performed by: STUDENT IN AN ORGANIZED HEALTH CARE EDUCATION/TRAINING PROGRAM

## 2022-04-22 PROCEDURE — 90833 PSYTX W PT W E/M 30 MIN: CPT | Performed by: STUDENT IN AN ORGANIZED HEALTH CARE EDUCATION/TRAINING PROGRAM

## 2022-04-22 PROCEDURE — 3725F SCREEN DEPRESSION PERFORMED: CPT | Performed by: STUDENT IN AN ORGANIZED HEALTH CARE EDUCATION/TRAINING PROGRAM

## 2022-04-22 PROCEDURE — 99214 OFFICE O/P EST MOD 30 MIN: CPT | Performed by: STUDENT IN AN ORGANIZED HEALTH CARE EDUCATION/TRAINING PROGRAM

## 2022-04-22 RX ORDER — VENLAFAXINE HYDROCHLORIDE 75 MG/1
75 CAPSULE, EXTENDED RELEASE ORAL DAILY
Qty: 90 CAPSULE | Refills: 1 | Status: SHIPPED | OUTPATIENT
Start: 2022-04-22

## 2022-04-22 NOTE — PSYCH
Psychiatric Medication Management - Behavioral Health   Amarilis Santoyo 22 y o  female MRN: [id number]    Reason for Visit:   Chief Complaint   Patient presents with    Anxiety    Depression       Subjective:    20-11 y/o female, domiciled with boyfriend in home in [place], completed an associate's degree at [address]- currently enrolled full-time at [employer] (studying English), does some part-time jobs, [facility] significant for h/o depression and anxiety, previously in outpatient therapy for a few months, no past psychiatric hospitalizations, 1 self-aborted past suicide attempt (plan to hang self about 6 months ago), no h/o self-injurious behaviors, no h/o physical aggression, no significant PMH, no active substance abuse, presents to [facility] outpatient clinic on referral from [facility] concerns about depression and anxiety, with patient reporting "I think I need help with my self-esteem" and father reporting "I'd like her to feel better about herself, understand that it is okay to feel bad or sad "     On problem-focused interview:  1   MDD-  Patient reports that things have been going okay  She reports that she is happy with her academic progress so far  Patient reports that her mood has been "good "  She reports that she had a brief period feeling down but reports that things have been more positive  Patient reports that the decrease in Effexor XR may have led to some worsening of mood and anxiety symptoms  She reports her focus was fine  She reports that she has been sleeping fine, has some trouble falling asleep at times, can have low energy at times      2  EMILY- She reports that she has had some periods of high anxiety, almost to the point of a panic attack  She reports that she is currently looking for a therapist for her anxiety symptoms  Patient reports that over the past month has been a bit higher  She reports that she is getting A's and B's in her classes    She reports that she 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 as well as some ad dov part-time jobs  She reports that she is considering being a  for an [place] class  Patient reports that she had one panic attack since last visit  She reports getting along well with the boyfriend  Review Of Systems:     Constitutional Negative   ENT Negative   Cardiovascular Negative   Respiratory Negative   Gastrointestinal Negative   Genitourinary Negative   Musculoskeletal Negative   Integumentary Negative   Neurological Negative   Endocrine Negative     Past Medical History:   Patient Active Problem List   Diagnosis    Closed fracture of left side of occipital bone (HCC)    Acne    Chronic vaginitis    Generalized anxiety disorder    Current moderate episode of major depressive disorder without prior episode (HCC)    Chronic pain of both knees    Canker sores oral    Tinea pedis of both feet    Tachycardia       Allergies: No Known Allergies    Past Surgical History:   Past Surgical History:   Procedure Laterality Date    APPENDECTOMY         Past Psychiatric History:    H/o depression and anxiety, previously in outpatient therapy for a few months, no past psychiatric hospitalizations, 1 past suicide attempts (plan to hang self about 6 months ago), no h/o self-injurious behaviors, no h/o physical aggression  Previously in outpatient therapy with Dr North Mt on weekly basis        Past Medication Trials: Zoloft up to 150 mg daily (ineffective after the concussion), Wellbutrin  mg daily (ineffective)     Family Psychiatric History:   Brother- Anxiety   Sister- OCD tendencies  Mother- Depression (Lexapro)  Father- Anger (Effexor XR)  Mat   Side- Depression     No FH of suicide     Social History:   Lives with parents, 2 siblings  Azael James works as a , mother works as a nurse in health system   Firearm in home- denies access to firearm      Substance Abuse:   Vaping- nearly everyday, quit in 2021  Cannabis- no use for past 2 years  Alcohol- 1-2 times per year    No cigarette use     Traumatic History: Denies any h/o physical or sexual abuse  The following portions of the patient's history were reviewed and updated as appropriate: allergies, current medications, past family history, past medical history, past social history, past surgical history and problem list     Objective:  Vitals:    22 1101   BP: 103/68   Pulse: 105         Weight (last 2 days)     Date/Time Weight    22 1101 56 9 (125 4)          Mental status:  Appearance sitting comfortably in chair, dressed in casual clothing, adequate hygiene and grooming, cooperative with interview, fairly well related   Mood "good "    Affect Appears generally euthymic, stable, mood-congruent   Speech Normal rate, rhythm, and volume   Thought Processes Linear and goal directed   Associations intact associations   Hallucinations Denies any auditory or visual hallucinations   Thought Content No passive or active suicidal or homicidal ideation, intent, or plan     Orientation Oriented to person, place, time, and situation   Recent and Remote Memory Grossly intact   Attention Span and Concentration Concentration intact   Intellect Appears to be of Average Intelligence   Insight Insight intact   Judgement judgment was intact   Muscle Strength Muscle strength and tone were normal   Language Within normal limits   Fund of Knowledge Age appropriate   Pain None     PHQ-A Depression Screening    Feeling down, depressed, irritable or hopeless: 1 - several days  Little interest or pleasure in doing things: 1 - several days  Trouble falling or staying asleep, or sleeping too much: 2 - more than half the days  Poor appetite or overeatin - not at all  Feeling tired or having little energy: 3 - nearly every day  Feeling bad about yourself - or that you are a failure or have let yourself or your family down: 0 - not at all  Trouble concentrating on things, such as reading the newspaper or watching television: 2 - more than half the days  Moving or speaking so slowly that other people could have noticed  Or the opposite - being so fidgety or restless that you have been moving around a lot more than usual: 0 - not at all  Thoughts that you would be better off dead, or of hurting yourself in some way: 0 - not at all                 Assessment/Plan:       Diagnoses and all orders for this visit:    Current moderate episode of major depressive disorder without prior episode (HCC)  -     venlafaxine (EFFEXOR-XR) 75 mg 24 hr capsule; Take 1 capsule (75 mg total) by mouth daily    Generalized anxiety disorder  -     venlafaxine (EFFEXOR-XR) 75 mg 24 hr capsule; Take 1 capsule (75 mg total) by mouth daily          Diagnosis: 1  Major Depressive Disorder- single episode, moderate severity, 2  Generalized Anxiety Disorder, r/o social anxiety disorder, 3   R/o ADHD- inattentive type        Treatment Recommendations:    20-11 y/o female, domiciled with boyfriend in home in [place], completed an associate's degree at [address]- currently enrolled full-time at [employer] (studying English), does some part-time jobs, [facility] significant for h/o depression and anxiety, previously in outpatient therapy for a few months, no past psychiatric hospitalizations, 1 self-aborted past suicide attempt (plan to hang self), no h/o self-injurious behaviors, no h/o physical aggression, no significant PMH, no active substance abuse, presents to [address] [place] [address] outpatient clinic on referral from [employer] concerns about depression and anxiety,with patient reporting "I think I need help with my self-esteem" and father reporting "I'd like her to feel better about herself, understand that it is okay to feel bad or sad "     On assessment today, patient overall remaining stable, some mild increase in anxiety with a panic attack since last visit, reports dry mouth and night sweats better with decrease in Effexor XR, in psychosocial context of attending community college, recently moving in with boyfriend, high intellectual functioning   No current passive or active SI, intent, or plan        In terms of suicide risk assessment, patient with minimal depressive symptoms, 1 past suicide attempt; however, patient is currently future-oriented, denies any current passive or active suicidal ideation, intent, or plan, has coping skills, no FH of suicide, no active substance use, no access to firearms, no global insomnia or psychic anxiety   Therefore, despite risk factors, patient is not currently an imminent risk of harm to self or others above chronic baseline risk and is appropriate for outpatient level of care at this time       Plan:  1  MDD- Will continue Effexor XR 75 mg daily given concerns about night sweats, dry mouth, monitor symptoms at lower dosage  PHQ-A score of 9, mild depression (4/22/22)  2  Anxiety- Will continue Effexor XR to continue helping with anxiety symptoms  Encouraged individual psychotherapy to help with anxiety symptoms- placd a referral for psychotherapy  Will continue Hydroxyzine 25 mg qhs prn sleep, anxiety   EMILY-7 score of 9, mild anxiety (4/22/22)  3  Medical- Continue to f/u with GI regarding chronic abdominal issues   F/u with primary care provider for on-going medical care  4  Follow-up with this provider in 3 months  Risks, Benefits And Possible Side Effects Of Medications:  Risks, benefits, and possible side effects of medications explained to patient and family, they verbalize understanding and Reviewed risks/benefits and side effects of antidepressant medications including black box warning on antidepressants, patient and family verbalize understanding  Psychotherapy Provided: Supportive psychotherapy provided  Counseling was provided during the session today for 16 minutes    Medications, treatment progress and treatment plan reviewed with Brenda  Recent stressor including school stress, social difficulties, everyday stressors and occasional anxiety discussed with Brenda  Coping strategies including getting into a good routine, improving self-esteem, increasing motivation, stress reduction, spending time with family and spending time with friends reviewed with Brenda  Reassurance and supportive therapy provided

## 2022-04-22 NOTE — BH TREATMENT PLAN
TREATMENT PLAN (Medication Management Only)        [facility]    Name and Date of Birth:  Phuong Age 22 y o  2000  Date of Treatment Plan: April 22, 2022  Diagnosis/Diagnoses:    1  Current moderate episode of major depressive disorder without prior episode ([address] )    2  Generalized anxiety disorder      Strengths/Personal Resources for Self-Care: supportive family, ability to communicate needs, ability to listen, average or above intelligence  Area/Areas of need (in own words): anxiety symptoms, depressive symptoms  1  Long Term Goal: improve anxiety, improve depression  Target Date: 1 year - 4/22/2023  Person/Persons responsible for completion of goal: BALDEMAR Chaney   2   Short Term Objective (s) - How will we reach this goal?:   A  Provider new recommended medication/dosage changes and/or continue medication(s): continue current medications as prescribed  B   Consider individual therapy for anxiety symptoms       Target Date: 3 months - 7/22/2022  Person/Persons Responsible for Completion of Goal: BALDEMAR Chaney  Progress Towards Goals: continuing treatment  Treatment Modality: medication management every 3 months  Review due 6 months from date of this plan: 6 months - 10/22/2022  Expected length of service: maintenance unless revised  My Physician/PA/NP and I have developed this plan together and I agree to work on the goals and objectives  I understand the treatment goals that were developed for my treatment      Treatment Plan done but not signed at time of office visit due to:  Plan reviewed by phone or in person and verbal consent given by patient and/or family at time of office visit due to Tsering social distkeyonna

## 2022-04-25 ENCOUNTER — TELEPHONE (OUTPATIENT)
Dept: PSYCHIATRY | Facility: CLINIC | Age: 22
End: 2022-04-25

## 2022-04-25 NOTE — TELEPHONE ENCOUNTER
spoke with patient to schedule for therapy and she will call back this afternoon to schedule when she is out of work

## 2022-04-26 NOTE — TELEPHONE ENCOUNTER
PT called back  PT stated she has class from 11am-2pm so anytime before that or after would work   Please call

## 2022-04-26 NOTE — TELEPHONE ENCOUNTER
Behavorial Health Outpatient Intake Questions    Referred by: Dr Murali Lobato     Please advised interviewee that they need to answer all questions truthfully to allow for best care and any misrepresentations of information may affect their ability to be seen at this clinic   => Was this discussed? Yes     Behavorial Health Outpatient Intake History -     Presenting Problem (in patient's words):   Just feels therapy is a good option for new stresses     Are there any developmental disabilities? ? If yes, can they speak to you on the phone? If they are too limited to speak to you on phone, refer out No    Are you taking any psychiatric medications? Yes    => If yes, who prescribes? If yes, are they injectable medications?   effexor = Dr Murali Lobato Does the patient have a language barrier or hearing impairment? No    Have you been treated at [facility] by a therapist or a doctor in the past? If yes, who? Yes Dr Murali Lobato     Has the patient been hospitalized for mental health? No   If yes, how long ago was last hospitalization and where was it? Do you actively use alcohol or marijuana or illegal substances? If yes, what and how much - refer out to Drug and alcohol treatment if use is excessive or daily use of illegal substances No concerns of substance abuse are reported  Do you have a community treatment team or ? No    Legal History-     Does the patient have any history of arrests, USP/alf time, or DUIs? No  If Yes-  1) What types of charges? 2) When were they last incarcerated? 3) Are they currently on parole or probation? Minor Child-    Who has custody of the child? Is there a custody agreement? If there is a custody agreement remind parent that they must bring a copy to the first appt or they will not be seen       Intake Team, please check with provider before scheduling if flags come up such as:  - complex case  - legal history (other than DUI)  - communication barrier concerns are present  - if, in your judgment, this needs further review    ACCEPTED as a patient Yes  => Appointment Date: 05/27/2022 @ 4:00 pm Tom Corfu    Referred Elsewhere? No    Name of [address] [place] [address]#[id number]  Insurance Phone #  If ins is primary or secondary  If patient is a minor, parents information such as Name, D  O B of guarantor

## 2022-04-27 ENCOUNTER — ANNUAL EXAM (OUTPATIENT)
Dept: OBGYN CLINIC | Facility: CLINIC | Age: 22
End: 2022-04-27
Payer: COMMERCIAL

## 2022-04-27 VITALS
SYSTOLIC BLOOD PRESSURE: 120 MMHG | BODY MASS INDEX: 22.61 KG/M2 | DIASTOLIC BLOOD PRESSURE: 70 MMHG | HEIGHT: 63 IN | WEIGHT: 127.6 LBS

## 2022-04-27 DIAGNOSIS — Z30.41 SURVEILLANCE OF CONTRACEPTIVE PILL: ICD-10-CM

## 2022-04-27 DIAGNOSIS — Z01.419 ENCOUNTER FOR GYNECOLOGICAL EXAMINATION WITHOUT ABNORMAL FINDING: Primary | ICD-10-CM

## 2022-04-27 PROBLEM — N76.1 CHRONIC VAGINITIS: Status: RESOLVED | Noted: 2017-03-15 | Resolved: 2022-04-27

## 2022-04-27 PROCEDURE — 3008F BODY MASS INDEX DOCD: CPT | Performed by: STUDENT IN AN ORGANIZED HEALTH CARE EDUCATION/TRAINING PROGRAM

## 2022-04-27 PROCEDURE — S0612 ANNUAL GYNECOLOGICAL EXAMINA: HCPCS | Performed by: NURSE PRACTITIONER

## 2022-04-27 RX ORDER — DROSPIRENONE AND ETHINYL ESTRADIOL 0.02-3(28)
1 KIT ORAL DAILY
Qty: 28 TABLET | Refills: 12 | Status: SHIPPED | OUTPATIENT
Start: 2022-04-27 | End: 2022-06-23 | Stop reason: ALTCHOICE

## 2022-04-27 NOTE — PROGRESS NOTES
Diagnoses and all orders for this visit:    Encounter for gynecological examination without abnormal finding    Surveillance of contraceptive pill  -     drospirenone-ethinyl estradiol (BERTRAM) 3-0 02 MG per tablet; Take 1 tablet by mouth daily        Calcium/vit d inclusion in the diet discussed, call with any issues, SBE reinforced, all concerns addressed  Pleasant 22 y o  premenopausal female here for annual exam  She denies any issues with bleeding or her menses  Reports regular cycles on ocp  Denies vaginal issues  Denies pelvic pain  Denies any issues with her BCM  Sexually active without any concerns  Declines an STD recheck, GC/CT neg 2020, same partner x 5 yrs  Received Gardasils x 3       Past Medical History:   Diagnosis Date    Anxiety     Chronic vaginitis 3/15/2017    Concussion     Last Assessed: 11/15/2017    Depression      Past Surgical History:   Procedure Laterality Date    APPENDECTOMY       Family History   Problem Relation Age of Onset    Restless legs syndrome Mother     Depression Mother     Depression Father     Colon cancer Paternal Grandmother     Uterine cancer Paternal Grandmother     OCD Sister     Anxiety disorder Brother     Hashimoto's thyroiditis Maternal Grandmother     Breast cancer Neg Hx     Ovarian cancer Neg Hx     Cervical cancer Neg Hx      Social History     Tobacco Use    Smoking status: Never Smoker    Smokeless tobacco: Never Used    Tobacco comment: Vape   Vaping Use    Vaping Use: Former    Substances: Nicotine, Flavoring   Substance Use Topics    Alcohol use: Yes     Comment: social    Drug use: No       Current Outpatient Medications:     albuterol (PROVENTIL HFA,VENTOLIN HFA) 90 mcg/act inhaler, Inhale 1-2 puffs, Disp: , Rfl: 0    dicyclomine (BENTYL) 20 mg tablet, Take 1 tablet (20 mg total) by mouth every 6 (six) hours, Disp: 60 tablet, Rfl: 0    drospirenone-ethinyl estradiol (BERTRAM) 3-0 02 MG per tablet, Take 1 tablet by mouth daily, Disp: 28 tablet, Rfl: 12    fluconazole (DIFLUCAN) 150 mg tablet, take 1 tablet by mouth AS A ONE TIME DOSE repeat in 3 days, Disp: , Rfl:     nystatin (MYCOSTATIN) ointment, Apply topically 2 (two) times a day, Disp: 45 g, Rfl: 0    venlafaxine (EFFEXOR-XR) 75 mg 24 hr capsule, Take 1 capsule (75 mg total) by mouth daily, Disp: 90 capsule, Rfl: 1  Patient Active Problem List    Diagnosis Date Noted    Tinea pedis of both feet 2022    Tachycardia 2022    Chronic pain of both knees 2020    Canker sores oral 2020    Generalized anxiety disorder 2018    Current moderate episode of major depressive disorder without prior episode ([facility] ) 2018    Closed fracture of left side of occipital bone (HCC) 2017    Acne 2016       No Known Allergies    OB History    Para Term  AB Living   0 0 0 0 0 0   SAB IAB Ectopic Multiple Live Births   0 0 0 0 0     [employer] for English, just bought a house with her BF  Katey [place] Vitals:    22 1615   BP: 120/70   BP Location: Left arm   Patient Position: Sitting   Weight: 57 9 kg (127 lb 9 6 oz)   Height: 5' 3" (1 6 m)     Body mass index is 22 6 kg/m²  Review of Systems   Constitutional: Negative for chills, fatigue, fever and unexpected weight change  Respiratory: Negative for shortness of breath  Gastrointestinal: Negative for anal bleeding, blood in stool, constipation and diarrhea  Genitourinary: Negative for difficulty urinating, dysuria and hematuria  Physical Exam   Constitutional: She appears well-developed and well-nourished  No distress  HENT:   Head: Normocephalic  Neck: Normal range of motion  Neck supple  Pulmonary: Effort normal   Breasts: bilateral without masses, skin changes or nipple discharge  Bilaterally soft and warm to touch  No areas of erythema or pain  Abdominal: Soft  Pelvic exam was performed with patient supine  No labial fusion   There is no rash, tenderness, lesion or injury on the right labia  There is no rash, tenderness, lesion or injury on the left labia  Urethral meatus does not show any tenderness, inflammation or discharge  Palpation of midline bladder without pain or discomfort  Uterus is not deviated, not enlarged, not fixed and not tender  Cervix exhibits no motion tenderness, no discharge and no friability  Right adnexum displays no mass, no tenderness and no fullness  Left adnexum displays no mass, no tenderness and no fullness  No erythema or tenderness in the vagina  No foreign body in the vagina  No signs of injury around the vagina  Scant vaginal discharge found (yeastlike) but she is asymptomatic  No signs of injury around the vagina or anus  Perineum without lesions, signs of injury, erythema or swelling  Lymphadenopathy:        Right: No inguinal adenopathy present  Left: No inguinal adenopathy present

## 2022-04-27 NOTE — PROGRESS NOTES
Patient presents for a routine annual visit  Menarche- 16 y o  LMP- 2 weeks ago  Birth control- Anastasiya    Patient is not a drinker  Currently sexually active  No family history of uterine, ovarian or breast cancer     No concerns/questions for today's visit     STD declined

## 2022-04-27 NOTE — PATIENT INSTRUCTIONS
Birth Control Pills   WHAT YOU NEED TO KNOW:   What are birth control pills? Birth control pills are also called oral contraceptives, or the pill  It is medicine that helps prevent pregnancy by stopping ovulation  Ovulation is when the ovaries make and release an egg cell each month  If this egg gets fertilized by sperm, pregnancy occurs  You will need to take the pill at the same time every day  Your healthcare provider will tell you when to start taking the pill  You will also be told what to do if you miss a dose  Instructions will depend on the kind of birth control pills you are taking  What are the different kinds of birth control pills? Some kinds are taken for 21 days in a row, followed by 7 days of placebo (no hormones) pills  Other kinds are taken for 24 days followed by 4 days of placebos  Each kind has a certain amount of female hormones  Your provider will decide on the kind that is best for you based on your age and other health conditions  What may be done before I can start taking birth control pills? You need to see your healthcare provider to get a prescription  Any of the following may be done before your healthcare provider gives you a prescription:  · Your healthcare provider will ask about diseases and illnesses you have had in the past  Your provider will check your risk for blood clots, heart conditions, or stroke  Tell your provider if you had gastric bypass surgery  This surgery can affect the way your body absorbs medicines such as birth control pills  · Your provider will also check your blood pressure, and may do a breast and pelvic exam  A Pap smear may also be done during the pelvic exam  This is a test to make sure you do not have abnormal changes on your cervix  You may need other tests, such as a urine test to make sure you are not pregnant  · Your provider will ask if you take any medicines and if you smoke   Smoking increases your risk for stroke, heart attack, or a blood clot in your lungs  If you smoke, you should not take certain kinds of birth control pills  What are the advantages of birth control pills? When birth control pills are used correctly, the chances of getting pregnant are very low  Birth control pills may help decrease bleeding and pain during your monthly period  They may also help prevent cancer of the uterus and ovaries  What are the disadvantages of birth control pills? You may have sudden changes in your mood or feelings while you take birth control pills  You may have nausea and a decreased sex drive  You may have an increased appetite and rapid weight gain  You may also have bleeding in between periods, less frequent periods, vaginal dryness, and breast pain  Birth control pills will not protect you from sexually transmitted infections  Rarely, some birth control pills can increase your risk for a blood clot  This may become life-threatening  What should I do if I decide I want to get pregnant? If you are planning to have a baby, ask your healthcare provider when you may stop taking your birth control pills  It may take some time for you to start ovulating again  Ask your healthcare provider for more information about pregnancy after birth control pills  When should I start taking birth control pills after I have a baby? If you are not breastfeeding, you may start taking birth control pills 3 weeks after you give birth  You may be able to take certain types of birth control pills if you are breastfeeding  These pills can be started from 6 weeks to 6 months after you give birth  Ask your healthcare provider for more information about when to start taking birth control pills after you give birth  What do I need to know about birth control pills and menopause? · Talk with your healthcare provider if you want to take birth control pills around menopause  · Around age 39, you will enter into perimenopause   This means your hormone levels are dropping and you are ovulating less often  You can still become pregnant during this time  The risk for problems, such as miscarriage, are higher if you become pregnant after age 39  Birth control pills will prevent pregnancy, and may also help prevent or relieve some signs and symptoms of menopause  Examples are hot flashes and mood swings  · Your provider will do tests when you are around age 48  The tests may show that you are in menopause  If the tests do not show menopause for sure, you may be able to continue taking the pill up to age 54  The decision will depend on your health and if you have any medical conditions, such as a blood clot  Call your local emergency number (911 in the [address]) for any of the following:   · You have any of the following signs of a stroke:      ? Numbness or drooping on one side of your face     ? Weakness in an arm or leg    ? Confusion or difficulty speaking    ? Dizziness, a severe headache, or vision loss    · You feel lightheaded, short of breath, and have chest pain  · You cough up blood  When should I seek immediate care? · Your arm or leg feels warm, tender, and painful  It may look swollen and red  · You have severe pain, numbness, or swelling in your arms or legs  When should I call my doctor? · You have forgotten to take a birth control pill  · You have mood changes, such as depression, since starting birth control pills  · You have nausea or are vomiting  · You have severe abdominal pain  · You missed a period and have questions or concerns about being pregnant  · You still have bleeding 4 months after taking birth control pills correctly  · You have questions or concerns about your condition or care  CARE AGREEMENT:   You have the right to help plan your care  Learn about your health condition and how it may be treated  Discuss treatment options with your healthcare providers to decide what care you want to receive   You always have the right to refuse treatment  The above information is an  only  It is not intended as medical advice for individual conditions or treatments  Talk to your doctor, nurse or pharmacist before following any medical regimen to see if it is safe and effective for you  © Copyright [employer] 2022 Information is for End User's use only and may not be sold, redistributed or otherwise used for commercial purposes   All illustrations and images included in CareNotes® are the copyrighted property of A D A M , Inc  or [address] [place] [address]

## 2022-04-28 ENCOUNTER — TELEPHONE (OUTPATIENT)
Dept: FAMILY MEDICINE CLINIC | Facility: CLINIC | Age: 22
End: 2022-04-28

## 2022-04-28 NOTE — TELEPHONE ENCOUNTER
Patient called regarding her athletes feet on both feet is persisting  Is there anything else that can be prescribed for her?

## 2022-05-17 ENCOUNTER — LAB (OUTPATIENT)
Dept: LAB | Facility: CLINIC | Age: 22
End: 2022-05-17
Payer: COMMERCIAL

## 2022-05-17 ENCOUNTER — OFFICE VISIT (OUTPATIENT)
Dept: DERMATOLOGY | Facility: CLINIC | Age: 22
End: 2022-05-17
Payer: COMMERCIAL

## 2022-05-17 VITALS — TEMPERATURE: 98.4 F | HEIGHT: 63 IN | BODY MASS INDEX: 22.15 KG/M2 | WEIGHT: 125 LBS

## 2022-05-17 DIAGNOSIS — T69.1XXA CHILBLAINS, INITIAL ENCOUNTER: ICD-10-CM

## 2022-05-17 DIAGNOSIS — T69.1XXA CHILBLAINS, INITIAL ENCOUNTER: Primary | ICD-10-CM

## 2022-05-17 LAB — CRP SERPL QL: 6.1 MG/L

## 2022-05-17 PROCEDURE — 86140 C-REACTIVE PROTEIN: CPT

## 2022-05-17 PROCEDURE — 86225 DNA ANTIBODY NATIVE: CPT

## 2022-05-17 PROCEDURE — 85300 ANTITHROMBIN III ACTIVITY: CPT

## 2022-05-17 PROCEDURE — 82595 ASSAY OF CRYOGLOBULIN: CPT

## 2022-05-17 PROCEDURE — 86235 NUCLEAR ANTIGEN ANTIBODY: CPT

## 2022-05-17 PROCEDURE — 85705 THROMBOPLASTIN INHIBITION: CPT

## 2022-05-17 PROCEDURE — 81240 F2 GENE: CPT

## 2022-05-17 PROCEDURE — 85670 THROMBIN TIME PLASMA: CPT

## 2022-05-17 PROCEDURE — 85305 CLOT INHIBIT PROT S TOTAL: CPT

## 2022-05-17 PROCEDURE — 36415 COLL VENOUS BLD VENIPUNCTURE: CPT

## 2022-05-17 PROCEDURE — 99203 OFFICE O/P NEW LOW 30 MIN: CPT | Performed by: DERMATOLOGY

## 2022-05-17 PROCEDURE — 85613 RUSSELL VIPER VENOM DILUTED: CPT

## 2022-05-17 PROCEDURE — 81241 F5 GENE: CPT

## 2022-05-17 PROCEDURE — 86146 BETA-2 GLYCOPROTEIN ANTIBODY: CPT

## 2022-05-17 PROCEDURE — 85303 CLOT INHIBIT PROT C ACTIVITY: CPT

## 2022-05-17 PROCEDURE — 86148 ANTI-PHOSPHOLIPID ANTIBODY: CPT

## 2022-05-17 PROCEDURE — 86147 CARDIOLIPIN ANTIBODY EA IG: CPT

## 2022-05-17 PROCEDURE — 85732 THROMBOPLASTIN TIME PARTIAL: CPT

## 2022-05-17 PROCEDURE — 85306 CLOT INHIBIT PROT S FREE: CPT

## 2022-05-17 PROCEDURE — 82585 ASSAY OF CRYOFIBRINOGEN: CPT

## 2022-05-17 PROCEDURE — 86038 ANTINUCLEAR ANTIBODIES: CPT

## 2022-05-17 RX ORDER — TRIAMCINOLONE ACETONIDE 1 MG/G
CREAM TOPICAL
Qty: 30 G | Refills: 1 | Status: SHIPPED | OUTPATIENT
Start: 2022-05-17

## 2022-05-17 NOTE — PROGRESS NOTES
[facility] Note     Patient Name: Lea Painter  Encounter Date: 5/17/2022     Have you been cared for by a [facility] Dermatologist in the last 3 years and, if so, which one? No    · Have you traveled outside of the [address] [place] [address] in the past 3 months or outside of the [facility] area in the last 2 weeks? No     May we call your Preferred Phone number to discuss your specific medical information? Yes     May we leave a detailed message that includes your specific medical information? Yes      Today's Chief Concerns:   Concern #1:  Rash on both feet      Past Medical History:  Have you personally ever had or currently have any of the following? · Skin cancer (such as Melanoma, Basal Cell Carcinoma, Squamous Cell Carcinoma? (If Yes, please provide more detail)- No  · Eczema: No  · Psoriasis: No  · HIV/AIDS: No  · Hepatitis B or C: No  · Tuberculosis: No  · Systemic Immunosuppression such as Diabetes, Biologic or Immunotherapy, Chemotherapy, Organ Transplantation, Bone Marrow Transplantation (If YES, please provide more detail): No  · Radiation Treatment (If YES, please provide more detail): No  · Any other major medical conditions/concerns? (If Yes, which types)- No    Social History:     What is/was your primary occupation? Student     What are your hobbies/past-times? Family History:  Have any of your "first degree relatives" (parent, brother, sister, or child) had any of the following       · Skin cancer such as Melanoma or Merkel Cell Carcinoma or Pancreatic Cancer? No  · Eczema, Asthma, Hay Fever or Seasonal Allergies: YES, mother has seasonal allergies  · Psoriasis or Psoriatic Arthritis: No  · Do any other medical conditions seem to run in your family? If Yes, what condition and which relatives?   No    Current Medications:   (please update all dermatological medications before printing patient's AVS!)      Current Outpatient Medications:    albuterol (PROVENTIL HFA,VENTOLIN HFA) 90 mcg/act inhaler, Inhale 1-2 puffs, Disp: , Rfl: 0    dicyclomine (BENTYL) 20 mg tablet, Take 1 tablet (20 mg total) by mouth every 6 (six) hours, Disp: 60 tablet, Rfl: 0    drospirenone-ethinyl estradiol (BERTRAM) 3-0 02 MG per tablet, Take 1 tablet by mouth daily, Disp: 28 tablet, Rfl: 12    fluconazole (DIFLUCAN) 150 mg tablet, take 1 tablet by mouth AS A ONE TIME DOSE repeat in 3 days, Disp: , Rfl:     nystatin (MYCOSTATIN) ointment, Apply topically 2 (two) times a day, Disp: 45 g, Rfl: 0    venlafaxine (EFFEXOR-XR) 75 mg 24 hr capsule, Take 1 capsule (75 mg total) by mouth daily, Disp: 90 capsule, Rfl: 1      Review of Systems:  Have you recently had or currently have any of the following? If YES, what are you doing for the problem? · Fever, chills or unintended weight loss: No  · Sudden loss or change in your vision: No  · Nausea, vomiting or blood in your stool: No  · Painful or swollen joints: No  · Wheezing or cough: No  · Changing mole or non-healing wound: No  · Nosebleeds: No  · Excessive sweating: No  · Easy or prolonged bleeding? No  · Over the last 2 weeks, how often have you been bothered by the following problems? Patient reports anxiety and depression and is treated  · Taking little interest or pleasure in doing things: 1 - Not at All  · Feeling down, depressed, or hopeless: 1 - Not at All  · Rapid heartbeat with epinephrine:  No    · FEMALES ONLY:    · Are you pregnant or planning to become pregnant? No  · Are you currently or planning to be nursing or breast feeding? No    · Any known allergies? · No Known Allergies      Physical Exam:     Was a chaperone (Derm Clinical Assistant) present throughout the entire Physical Exam? Yes     Did the Dermatology Team specifically  the patient on the importance of a Full Skin Exam to be sure that nothing is missed clinically?  Yes}  o Did the patient ultimately request or accept a Full Skin Exam? NO  o Did the patient specifically refuse to have the areas "under-the-bra" examined by the Dermatologist? Bhargavi Ortiz  o Did the patient specifically refuse to have the areas "under-the-underwear" examined by the Dermatologist? YES    CONSTITUTIONAL:   Vitals:    05/17/22 0900   Temp: 98 4 °F (36 9 °C)   TempSrc: Temporal   Weight: 56 7 kg (125 lb)   Height: 5' 3" (1 6 m)       PSYCH: Normal mood and affect  EYES: Normal conjunctiva  ENT: Normal lips and oral mucosa  CARDIOVASCULAR: No edema  RESPIRATORY: Normal respirations  HEME/LYMPH/IMMUNO:  No regional lymphadenopathy except as noted below in "ASSESSMENT AND PLAN BY DIAGNOSIS"    SKIN:  FULL ORGAN SYSTEM EXAM   Face Normal except as noted below in Assessment       Right Hand/Fingers Normal except as noted below in Assessment   Left Hand/Fingers Normal except as noted below in Assessment                   Right Foot, Toes Normal except as noted below in Assessment   Left  Foot, Toes Normal except as noted below in Assessment        Assessment and Plan by Diagnosis:    History of Present Condition:     Duration:  How long has this been an issue for you?    o  4-5 months   Location Affected:  Where on the body is this affecting you?    o  Both feet   Quality:  Is there any bleeding, pain, itch, burning/irritation, or redness associated with the skin lesion?    o  Itchy, burning and red   Severity:  Describe any bleeding, pain, itch, burning/irritation, or redness on a scale of 1 to 10 (with 10 being the worst)  o  10   Timing:  Does this condition seem to be there pretty constantly or do you notice it more at specific times throughout the day?    o  Constantly   Context:  Have you ever noticed that this condition seems to be associated with specific activities you do?    o  Socks and heating pad on feet   Modifying Factors:    o Anything that seems to make the condition worse? -  Sweating   o What have you tried to do to make the condition better?     - Nystatin ointment twice a day for 2 months and oral terbinafine 250 mg daily for 2 weeks   Associated Signs and Symptoms:  Does this skin lesion seem to be associated with any of the following:  o  SL AMB DERM SIGNS AND SYMPTOMS: Redness, Itching and Scratching and Skin color changes     1  CHILBLAINS    Physical Exam:   Anatomic Location Affected:  Dorsum of all toes, pulp of hands   Morphological Description:  Erythematous patches    Additional History of Present Condition:  Present for 4-5 months  Patient has been treated with nystatin ointment and oral terbinafine  Patient denies family history of connective tissue disease  Assessment and Plan:  Based on a thorough discussion of this condition and the management approach to it (including a comprehensive discussion of the known risks, side effects and potential benefits of treatment), the patient (family) agrees to implement the following specific plan:   Labs order, not fasting   May need to See Dr Mirza Anthony, depending on lab results   Begin triamcinolone cream twice a day as needed for itch   Keep feet warm with wool socks   Clinically this is chilblains perhaps associated with LE and/or raynauds  Laboratory reassesment for lupus and cryos is indicated  Biopsy in chilblains is usually nonspecific inflamatory process  Await labs and will observe response to warmer weather and cold avoidance      Scribe Attestation    I,:  Olivia Brown MA am acting as a scribe while in the presence of the attending physician :       I,:  Kenn Nelson MD personally performed the services described in this documentation    as scribed in my presence :

## 2022-05-17 NOTE — PATIENT INSTRUCTIONS
1   CHILBLAINS    Physical Exam:  Anatomic Location Affected:  Dorsum of all toes, pulp of hands      Assessment and Plan:  Based on a thorough discussion of this condition and the management approach to it (including a comprehensive discussion of the known risks, side effects and potential benefits of treatment), the patient (family) agrees to implement the following specific plan:  Labs order, not fasting  May need to See Dr Delilah Yang, depending on lab results  Begin triamcinolone cream twice a day as needed for itch  Keep feet warm with wool socks

## 2022-05-18 LAB
ANA HOMOGEN TITR SER: NORMAL {TITER}
ANA TITR SER IF: POSITIVE {TITER}
DEPRECATED AT III PPP: 96 % OF NORMAL (ref 92–136)
DSDNA AB SER-ACNC: <1 IU/ML (ref 0–9)
ENA SS-A AB SER-ACNC: <0.2 AI (ref 0–0.9)
ENA SS-B AB SER-ACNC: <0.2 AI (ref 0–0.9)
SL AMB NOTE:: NORMAL

## 2022-05-19 ENCOUNTER — TELEPHONE (OUTPATIENT)
Dept: DERMATOLOGY | Facility: CLINIC | Age: 22
End: 2022-05-19

## 2022-05-19 LAB
APTT SCREEN TO CONFIRM RATIO: 1.29 RATIO (ref 0–1.34)
CONFIRM APTT/NORMAL: 33.8 SEC (ref 0–47.6)
DRVVT IMM 1:2 NP PPP: 41.7 SEC (ref 0–40.4)
DRVVT SCREEN TO CONFIRM RATIO: 1.4 RATIO (ref 0.8–1.2)
LA PPP-IMP: ABNORMAL
MISCELLANEOUS LAB TEST RESULT: NORMAL
PROT C AG ACT/NOR PPP IA: >150 % OF NORMAL (ref 60–150)
PROT S ACT/NOR PPP: 103 % (ref 68–108)
PROT S ACT/NOR PPP: 80 % (ref 61–136)
PROT S PPP-ACNC: 70 % (ref 60–150)
SCREEN APTT: 48.4 SEC (ref 0–51.9)
SCREEN DRVVT: 47.3 SEC (ref 0–47)
THROMBIN TIME: 16.3 SEC (ref 0–23)

## 2022-05-19 NOTE — TELEPHONE ENCOUNTER
I spoke to patient  Labs still pending   I note that there is a question of hypercoagulable state with lupus anticoagulant  Patient is on vanita  I noted to her that I will contact her rheumatologist   Alternative contraception may need to be considered

## 2022-05-20 ENCOUNTER — TELEPHONE (OUTPATIENT)
Dept: DERMATOLOGY | Facility: CLINIC | Age: 22
End: 2022-05-20

## 2022-05-20 ENCOUNTER — TELEPHONE (OUTPATIENT)
Dept: OBGYN CLINIC | Facility: HOSPITAL | Age: 22
End: 2022-05-20

## 2022-05-20 LAB
B2 GLYCOPROT1 IGA SERPL IA-ACNC: 1
B2 GLYCOPROT1 IGG SERPL IA-ACNC: <0.6
B2 GLYCOPROT1 IGM SERPL IA-ACNC: <2.9
CARDIOLIPIN IGA SER IA-ACNC: 1.4
CARDIOLIPIN IGG SER IA-ACNC: 0.9
CARDIOLIPIN IGM SER IA-ACNC: 2.7

## 2022-05-20 NOTE — TELEPHONE ENCOUNTER
Patient sees Dr Helder Ordaz  Patient is calling in stating that she was previously seen with the Dr in the past and has been having intensive testings done  The patient was recently just seen with a  Dermatologist Dr Sal Maynard who advised her to follow up with Dr Helder Ordaz as she is wanting an appointment as soon as possible she was not willing to wait until the next we had available please advise          Call back# [phone]

## 2022-05-23 LAB
CRYOFIB PLAS QL: NORMAL
F2 GENE MUT ANL BLD/T: NORMAL

## 2022-05-23 NOTE — RESULT ENCOUNTER NOTE
Labs show a chronic elevation of CRP,  borederline ABEL , and a possible lupus anticoagulant  From the standpoint of america Watt) would just like to observe with warmer weather  I have asked her to follow  up with rheumatology whom she  has seen in past   Due to a question of hyper coaguable state alternative to Anastasiya may be a consideration

## 2022-05-24 ENCOUNTER — TELEPHONE (OUTPATIENT)
Dept: OBGYN CLINIC | Facility: OTHER | Age: 22
End: 2022-05-24

## 2022-05-24 LAB — CRYOGLOB SER QL 1D COLD INC: NORMAL

## 2022-05-24 NOTE — TELEPHONE ENCOUNTER
Patients mother called back, I ; for some reason cannot add to the original message , dated 05-20  Mom called to see why appointment was so far out, as she is listed for a New Patient Appointment on 11-  Mom said she had seen Dr Antony Ozuna back in 2020, but this is the "same issue"     But from what I am seeing is patient was seen for mouth ulcers and joint pain  This "New Patient" appointment would be for Lupus  Mom had asked that if this were to be a follow up visit if Brenda can switch appointments with mom herself, on 06-21 to be seen sooner      I need clarification as to if this diagnosis back in 2020 and this "Lupus" diagnosis for 11- is the same thing, which then would hence , possibly a sooner "Follow Up"     Moms appointment is June 21st 02- Prerna STAHL/vanesa [phone]    * I tried to make this as clear as possible, please     I got ahold of Jace Branham, she confirmed that this can be a follow up appointment

## 2022-05-25 ENCOUNTER — OFFICE VISIT (OUTPATIENT)
Dept: RHEUMATOLOGY | Facility: CLINIC | Age: 22
End: 2022-05-25
Payer: COMMERCIAL

## 2022-05-25 ENCOUNTER — LAB (OUTPATIENT)
Dept: LAB | Facility: CLINIC | Age: 22
End: 2022-05-25
Payer: COMMERCIAL

## 2022-05-25 VITALS — BODY MASS INDEX: 22.15 KG/M2 | WEIGHT: 125 LBS | HEIGHT: 63 IN

## 2022-05-25 DIAGNOSIS — R76.8 POSITIVE ANA (ANTINUCLEAR ANTIBODY): Primary | ICD-10-CM

## 2022-05-25 DIAGNOSIS — T69.1XXD CHILBLAINS, SUBSEQUENT ENCOUNTER: ICD-10-CM

## 2022-05-25 DIAGNOSIS — M25.50 DIFFUSE ARTHRALGIA: ICD-10-CM

## 2022-05-25 DIAGNOSIS — R76.8 POSITIVE ANA (ANTINUCLEAR ANTIBODY): ICD-10-CM

## 2022-05-25 DIAGNOSIS — R76.0 LUPUS ANTICOAGULANT POSITIVE: ICD-10-CM

## 2022-05-25 LAB
BACTERIA UR QL AUTO: ABNORMAL /HPF
BILIRUB UR QL STRIP: NEGATIVE
C3 SERPL-MCNC: 147 MG/DL (ref 90–180)
C4 SERPL-MCNC: 33 MG/DL (ref 10–40)
CK MB SERPL-MCNC: 0.4 % (ref 0–2.5)
CK MB SERPL-MCNC: 0.6 NG/ML (ref 0.6–6.3)
CK SERPL-CCNC: 154 U/L (ref 26–192)
CLARITY UR: CLEAR
COLOR UR: YELLOW
CREAT UR-MCNC: 63.5 MG/DL
ERYTHROCYTE [SEDIMENTATION RATE] IN BLOOD: 8 MM/HOUR (ref 0–19)
F5 GENE MUT ANL BLD/T: NORMAL
GLUCOSE UR STRIP-MCNC: NEGATIVE MG/DL
HGB UR QL STRIP.AUTO: NEGATIVE
KETONES UR STRIP-MCNC: NEGATIVE MG/DL
LEUKOCYTE ESTERASE UR QL STRIP: NEGATIVE
NITRITE UR QL STRIP: NEGATIVE
NON-SQ EPI CELLS URNS QL MICRO: ABNORMAL /HPF
PH UR STRIP.AUTO: 7 [PH]
PROT UR STRIP-MCNC: NEGATIVE MG/DL
PROT UR-MCNC: 5 MG/DL
PROT/CREAT UR: 0.08 MG/G{CREAT} (ref 0–0.1)
RBC #/AREA URNS AUTO: ABNORMAL /HPF
SP GR UR STRIP.AUTO: 1.01 (ref 1–1.03)
UROBILINOGEN UR QL STRIP.AUTO: 0.2 E.U./DL
WBC #/AREA URNS AUTO: ABNORMAL /HPF

## 2022-05-25 PROCEDURE — 36415 COLL VENOUS BLD VENIPUNCTURE: CPT

## 2022-05-25 PROCEDURE — 82553 CREATINE MB FRACTION: CPT

## 2022-05-25 PROCEDURE — 81001 URINALYSIS AUTO W/SCOPE: CPT | Performed by: INTERNAL MEDICINE

## 2022-05-25 PROCEDURE — 82570 ASSAY OF URINE CREATININE: CPT | Performed by: INTERNAL MEDICINE

## 2022-05-25 PROCEDURE — 85652 RBC SED RATE AUTOMATED: CPT

## 2022-05-25 PROCEDURE — 83520 IMMUNOASSAY QUANT NOS NONAB: CPT

## 2022-05-25 PROCEDURE — 86037 ANCA TITER EACH ANTIBODY: CPT

## 2022-05-25 PROCEDURE — 3008F BODY MASS INDEX DOCD: CPT | Performed by: INTERNAL MEDICINE

## 2022-05-25 PROCEDURE — 82550 ASSAY OF CK (CPK): CPT

## 2022-05-25 PROCEDURE — 99215 OFFICE O/P EST HI 40 MIN: CPT | Performed by: INTERNAL MEDICINE

## 2022-05-25 PROCEDURE — 86235 NUCLEAR ANTIGEN ANTIBODY: CPT

## 2022-05-25 PROCEDURE — 86160 COMPLEMENT ANTIGEN: CPT

## 2022-05-25 PROCEDURE — 84156 ASSAY OF PROTEIN URINE: CPT | Performed by: INTERNAL MEDICINE

## 2022-05-25 PROCEDURE — 1036F TOBACCO NON-USER: CPT | Performed by: INTERNAL MEDICINE

## 2022-05-25 RX ORDER — NIFEDIPINE 30 MG/1
30 TABLET, EXTENDED RELEASE ORAL DAILY
Qty: 90 TABLET | Refills: 0 | Status: SHIPPED | OUTPATIENT
Start: 2022-05-25 | End: 2022-05-25

## 2022-05-25 RX ORDER — NIFEDIPINE 30 MG/1
30 TABLET, EXTENDED RELEASE ORAL DAILY
Qty: 30 TABLET | Refills: 0 | Status: SHIPPED | OUTPATIENT
Start: 2022-05-25 | End: 2022-06-23 | Stop reason: ALTCHOICE

## 2022-05-25 NOTE — PROGRESS NOTES
Assessment and Plan:   Ms Rj Cespedes a 26-year-old female with history significant for irritable bowel syndrome with constipation, anxiety and depression who presents for re-evaluation of a positive ABEL, lupus anticoagulant and chilblains     - Brenda presents today for an acute visit to follow up on a positive ABEL and lupus anticoagulant that was detected in view of chilblains affecting her bilateral toes  She reports many of the symptoms we discussed previously including night sweats, dry mouth, recurrent painful mouth ulcerations and progressive arthralgias had spontaneously resolved over the course of the year until she noticed the chilblains that started approximately 5-6 months ago  In view of this she had additional evaluation done by Dermatology which showed a positive ABEL and lupus anticoagulant which had been normal/unremarkable in the past     - At this time I agree with the diagnosis of chilblains but advised her that we do not have clear evidence to diagnose her with systemic lupus erythematosus/cutaneous lupus that may be causing this  While she does have some positive features in view of the abnormal labs I do not think she has sufficient criteria for lupus so I will plan to continue monitoring for now  If she has additional positive antibodies, a persistent lupus anticoagulant when rechecked in 12 weeks or develops concerning symptoms over time then I would reconsider the lupus diagnosis and discuss starting her on hydroxychloroquine  - For now I will manage the chilblains with conservative measures such as keeping her extremities warm and continuing the topical triamcinolone ointment  I also offered her the option of starting nifedipine 15-30 mg once daily (she will check with the pharmacy if this medication can be broken in half), but we will need to be cautious with starting her on antihypertensive medications as she already has baseline low blood pressures    I advised her if she starts the nifedipine to monitor her pressures very closely at home and let me know if she has any side effects such as lightheadedness or dizziness  - I will update some of her serologies at this time and repeat the lupus anticoagulant in 12 weeks to monitor for persistent positivity  I will see her for a follow-up in 3 months but requested she contact me in the interim if the chilblains is not improving or if she develops new symptoms  The plan will also involve continued monitoring for lupus as with these new features it seems like she may be on the spectrum of a connective tissue disease        Plan:  Diagnoses and all orders for this visit:    Positive ABLE (antinuclear antibody)  -     Anti-Jacqueline 1 Antibody; Future  -     Anti-scleroderma antibody; Future  -     Centromere Antibody; Future  -     Histone Antibody; Future  -     Nuclear antigen antibody; Future  -     C3 complement; Future  -     C4 complement; Future  -     Sedimentation rate, automated; Future  -     Urinalysis with microscopic  -     Protein / creatinine ratio, urine  -     Anti-neutrophilic cytoplasmic antibody; Future  -     CK; Future    Lupus anticoagulant positive  -     Lupus anticoagulant; Future    Chilblains, subsequent encounter  -     Discontinue: NIFEdipine (PROCARDIA XL) 30 mg 24 hr tablet; Take 1 tablet (30 mg total) by mouth daily  -     NIFEdipine (PROCARDIA XL) 30 mg 24 hr tablet; Take 1 tablet (30 mg total) by mouth daily    Diffuse arthralgia      Activities as tolerated  Continue other medications as prescribed by PCP and other specialists  RTC in 3 months          HPI    INITIAL VISIT NOTE (12/2020):  Ms Rosey Vaca a 32-year-old female with history significant for irritable bowel syndrome with constipation, anxiety and depression, who presents for further evaluation of multiple complaints including recurrent mouth ulcerations and joint pains   She is referred by MAYRA Paredes for a rheumatology consult      Patient reports she has experienced joint pains affecting her low back/hip region and bilateral knees probably since her childhood, but she has noticed them to be more prominent and progressive over the past 2-3 years  Will Allison had seen orthopedics while she was in high school and had to utilize knee braces   She reports while the pain may be present on a low level intensity nearly every day, approximately once per week she will have a significant flare-up of the pain   She reports with resting she may get some relief but not every time   She does take over-the-counter ibuprofen as needed for the joint pains which helps   In terms of the low back pain this does not wake her up from sleep at night and is not associated with morning stiffness   She reports activities will generally worsen her pain and she does get some relief with resting   At times she may notice an achiness sensation in her hands   She denies any joint pains of her wrists, elbows, shoulders, ankles or feet   Intermittently she has noticed mild swelling affecting her knees   She experiences morning stiffness only affecting her knees which takes about 30-45 minutes to improve   She has had x-rays of her hips and knees done in 2018 and 2020 which were all normal      Over the past 6 months she has also noticed additional symptoms arise such as recurrent painful mouth ulcerations that appear like canker sores   They resolve spontaneously but she states over the past few months they have been present on a near constant basis   She has seen her primary care physician for this and had an HSV swab done which was normal   She has also been trialed on multiple medications including fluconazole, famciclovir, lidocaine mucosal solution and triamcinolone topical paste which have not helped significantly   She does not get any ulcers in her nose or in her genital region, but does describe frequent yeast infections        In the past 6 months she has also had issues with lower abdominal pain flare-ups associated with constipation   She was seen by Gastroenterology and had a CT abdomen as well as colonoscopy done which were all normal   She was diagnosed with irritable bowel syndrome with constipation  Erlin Chandler has noticed issues relating to her toes being very sensitive with color changes to white, but this does not always happen with cold exposure and seems to occur spontaneously   She does not notice any color changes affecting her hands      She denies fevers, chills, unintentional weight loss (has noticed night sweats), hair loss, dry eyes (does notice dry mouth), inflammatory eye disease, skin rash, psoriasis, photosensitivity, swollen glands, pleuritic chest pain, shortness of breath, blood in stools, blood clots, miscarriages, symptoms strongly suggestive of Raynaud's or family history of autoimmune disease   She does have 2 siblings who are healthy   She did have appendicitis when she was in 2nd grade but otherwise had a healthy childhood      In view of the constellation of symptoms she was seen by her primary care physician and had testing done in September 2020 which showed a normal ABEL, CK, rheumatoid factor, Lyme antibody profile and ESR   The CRP was minimally elevated at 5   Prior to that a CBC, CMP, TSH and vitamin-D were normal   She did have testing done in October 2018 as well which showed a normal ABEL, rheumatoid factor and HLA B27 antigen   The ESR and CRP were minimally elevated at 25 and 6, respectively         3/31/2021:  Patient presents for a follow-up today  I reviewed her labs which showed a positive ABEL by immunofluorescence 1:160 dense fine speckled pattern    The ABEL specificity, C3, C4, antiphospholipid antibody testing, urinalysis, urine protein creatinine ratio, hepatitis panel, ferritin, anti CCP antibody, celiac disease antibody profile, HIV testing and herpes antibodies were unremarkable      Since the last office visit she has not had any new complaints and continues to experience the night sweats, lower abdominal discomfort, Raynaud's like symptoms and joint pains  She states that the mouth ulcers resolved spontaneously and she has not had any in the past 1 and half months  She did try the over-the-counter zinc but this was not helpful        5/25/2022:  Patient presents for a follow-up today for re-evaluation of a positive ABEL  She was last seen in March 2021 at which time there were no concerning lab abnormalities or diagnosis and I had recommended a monitoring approach  She was scheduled for April 2022 but canceled her appointment as she was feeling well as the symptoms we discussed previously such as dry mouth and night sweats resolved with lowering her antidepressant medications  She reports the mouth sores also resolved spontaneously and have occurred on a rare occasion  She mentions the knee pain she was experiencing had improved after physical therapy  The Raynaud's like symptoms affecting her feet were still occurring but were manageable  No symptoms such as fevers, unintentional weight loss, alopecia, dry eyes, skin rash, nose ulcers, swollen glands, pleuritic chest pain or additional concerning joint pains/swelling/stiffness (does notice this in her TMJs, wrists and ankles but thinks this may be related to rolling her joints which she does when she is anxious)  Her main concern at this time is the appearance of red, blistering lesions that are very itchy occurring mostly on her bilateral toes as well as small spots on the palmar aspect of her bilateral 3rd fingers  Initially she was seen by her primary care physician and there were concerns that this may be secondary to athlete's foot but she did not respond to topical or oral antifungals  She was then referred to Dermatology and diagnosed with chilblains    To assess if this may be secondary to lupus she had blood work done which showed a positive ABEL 1:80 homogeneous pattern with a positive lupus anticoagulant  A C-reactive protein was slightly elevated at 6 3  Cryoglobulin, cryofibrinogen, antiphospholipid antibody panel, double-stranded DNA antibody and Sjogren's antibodies were normal   She was asked to establish with Rheumatology for these abnormal labs  She was prescribed topical triamcinolone ointment but this has not helped significantly  She reports with the warmer temperatures her symptoms have improved but are still present  Of note she denies a history of thrombotic events or miscarriages  She also discussed with her OBGYN changing her oral contraceptive to an IUD  The following portions of the patient's history were reviewed and updated as appropriate: allergies, current medications, past family history, past medical history, past social history, past surgical history and problem list       Review of Systems  Constitutional: Negative for weight change, fevers, chills, night sweats, fatigue  ENT/Mouth: Negative for hearing changes, ear pain, nasal congestion, sinus pain, hoarseness, sore throat, rhinorrhea, swallowing difficulty  Eyes: Negative for pain, redness, discharge, vision changes  Cardiovascular: Negative for chest pain, SOB, palpitations  Respiratory: Negative for cough, sputum, wheezing, dyspnea  Gastrointestinal: Negative for nausea, vomiting, diarrhea, constipation, pain, heartburn  Genitourinary: Negative for dysuria, urinary frequency, hematuria  Musculoskeletal: As per HPI  Skin: Positive for skin rash, color changes  Neuro: Negative for weakness, numbness, tingling, loss of consciousness  Psych: Negative for anxiety, depression  Heme/Lymph: Negative for easy bruising, bleeding, lymphadenopathy          Past Medical History:   Diagnosis Date    Anxiety     Chronic vaginitis 3/15/2017    Concussion     Last Assessed: 11/15/2017    Depression        Past Surgical History:   Procedure Laterality Date    APPENDECTOMY Social History     Socioeconomic History    Marital status: Single     Spouse name: Not on file    Number of children: Not on file    Years of education: Not on file    Highest education level: Not on file   Occupational History    Not on file   Tobacco Use    Smoking status: Never Smoker    Smokeless tobacco: Never Used    Tobacco comment: Vape   Vaping Use    Vaping Use: Former    Substances: Nicotine, Flavoring   Substance and Sexual Activity    Alcohol use: Yes     Comment: social    Drug use: No    Sexual activity: Yes     Partners: Male     Birth control/protection: OCP   Other Topics Concern    Not on file   Social History Narrative    Immunization status: up to date and documented       Social Determinants of Health     Financial Resource Strain: Not on file   Food Insecurity: Not on file   Transportation Needs: Not on file   Physical Activity: Not on file   Stress: Not on file   Social Connections: Not on file   Intimate Partner Violence: Not on file   Housing Stability: Not on file       Family History   Problem Relation Age of Onset    Restless legs syndrome Mother     Depression Mother     Depression Father     Colon cancer Paternal Grandmother     Uterine cancer Paternal Grandmother     OCD Sister     Anxiety disorder Brother     Hashimoto's thyroiditis Maternal Grandmother     Breast cancer Neg Hx     Ovarian cancer Neg Hx     Cervical cancer Neg Hx        No Known Allergies      Current Outpatient Medications:     albuterol (PROVENTIL HFA,VENTOLIN HFA) 90 mcg/act inhaler, Inhale 1-2 puffs, Disp: , Rfl: 0    dicyclomine (BENTYL) 20 mg tablet, Take 1 tablet (20 mg total) by mouth every 6 (six) hours, Disp: 60 tablet, Rfl: 0    drospirenone-ethinyl estradiol (BERTRAM) 3-0 02 MG per tablet, Take 1 tablet by mouth daily, Disp: 28 tablet, Rfl: 12    fluconazole (DIFLUCAN) 150 mg tablet, take 1 tablet by mouth AS A ONE TIME DOSE repeat in 3 days, Disp: , Rfl:     NIFEdipine (PROCARDIA XL) 30 mg 24 hr tablet, Take 1 tablet (30 mg total) by mouth daily, Disp: 30 tablet, Rfl: 0    nystatin (MYCOSTATIN) ointment, Apply topically 2 (two) times a day, Disp: 45 g, Rfl: 0    triamcinolone (KENALOG) 0 1 % cream, Apply topically twice a day as needed for itch, Disp: 30 g, Rfl: 1    venlafaxine (EFFEXOR-XR) 75 mg 24 hr capsule, Take 1 capsule (75 mg total) by mouth daily, Disp: 90 capsule, Rfl: 1      Objective:    Vitals:    05/25/22 1522   Weight: 56 7 kg (125 lb)   Height: 5' 3" (1 6 m)       Physical Exam  General: Well appearing, well nourished, in no distress  Oriented x 3, normal mood and affect  Ambulating without difficulty  Skin: Good turgor, no unusual bruising or prominent lesions  Erythematous papular lesions noted on her bilateral toes  Hair: Normal texture and distribution  Nails: Normal color, no deformities  HEENT:  Head: Normocephalic, atraumatic  Eyes: Conjunctiva clear, sclera non-icteric, EOM intact  Nose: No external lesions, mucosa non-inflamed  Extremities: No amputations or deformities, cyanosis, edema  Neurologic: Alert and oriented  No focal neurological deficits appreciated  Psychiatric: Normal mood and affect  BALDEMAR Shields    Rheumatology

## 2022-05-27 LAB
CENTROMERE B AB SER-ACNC: <0.2 AI (ref 0–0.9)
ENA JO1 AB SER-ACNC: <0.2 AI (ref 0–0.9)
ENA RNP AB SER-ACNC: <0.2 AI (ref 0–0.9)
ENA SCL70 AB SER-ACNC: <0.2 AI (ref 0–0.9)
ENA SM AB SER-ACNC: <0.2 AI (ref 0–0.9)

## 2022-06-01 LAB
C-ANCA TITR SER IF: NORMAL TITER
HISTONE IGG SER IA-ACNC: 0.5 UNITS (ref 0–0.9)
MYELOPEROXIDASE AB SER IA-ACNC: <9 U/ML (ref 0–9)
P-ANCA ATYPICAL TITR SER IF: NORMAL TITER
P-ANCA TITR SER IF: NORMAL TITER
PROTEINASE3 AB SER IA-ACNC: <3.5 U/ML (ref 0–3.5)

## 2022-06-03 DIAGNOSIS — N76.1 CHRONIC VAGINITIS: Primary | ICD-10-CM

## 2022-06-06 RX ORDER — FLUCONAZOLE 150 MG/1
TABLET ORAL
Qty: 2 TABLET | Refills: 0 | Status: SHIPPED | OUTPATIENT
Start: 2022-06-06 | End: 2022-06-09

## 2022-06-11 ENCOUNTER — DOCUMENTATION (OUTPATIENT)
Dept: RHEUMATOLOGY | Facility: CLINIC | Age: 22
End: 2022-06-11

## 2022-06-11 DIAGNOSIS — T69.1XXD CHILBLAINS, SUBSEQUENT ENCOUNTER: Primary | ICD-10-CM

## 2022-06-11 RX ORDER — NIFEDIPINE 10 MG/1
10 CAPSULE ORAL DAILY
Qty: 30 CAPSULE | Refills: 0 | Status: SHIPPED | OUTPATIENT
Start: 2022-06-11

## 2022-06-16 ENCOUNTER — TELEMEDICINE (OUTPATIENT)
Dept: BEHAVIORAL/MENTAL HEALTH CLINIC | Facility: CLINIC | Age: 22
End: 2022-06-16
Payer: COMMERCIAL

## 2022-06-16 DIAGNOSIS — F41.1 GENERALIZED ANXIETY DISORDER: ICD-10-CM

## 2022-06-16 DIAGNOSIS — F32.1 CURRENT MODERATE EPISODE OF MAJOR DEPRESSIVE DISORDER WITHOUT PRIOR EPISODE (HCC): Primary | ICD-10-CM

## 2022-06-16 PROCEDURE — 90791 PSYCH DIAGNOSTIC EVALUATION: CPT | Performed by: SOCIAL WORKER
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
## 2022-06-20 ENCOUNTER — TELEPHONE (OUTPATIENT)
Dept: PSYCHIATRY | Facility: CLINIC | Age: 22
End: 2022-06-20

## 2022-06-20 NOTE — TELEPHONE ENCOUNTER
Pt called in stating she needs to schedule her next 4 upcoming appts  Pt stated did not get to do so with therapist at her last virtual session due to going over the time limit  Pt would like a call back to get her next appts scheduled accordingly  Thanks

## 2022-06-23 ENCOUNTER — OFFICE VISIT (OUTPATIENT)
Dept: OBGYN CLINIC | Age: 22
End: 2022-06-23
Payer: COMMERCIAL

## 2022-06-23 VITALS
WEIGHT: 126 LBS | DIASTOLIC BLOOD PRESSURE: 70 MMHG | SYSTOLIC BLOOD PRESSURE: 112 MMHG | BODY MASS INDEX: 22.32 KG/M2 | HEIGHT: 63 IN

## 2022-06-23 DIAGNOSIS — Z30.430 ENCOUNTER FOR IUD INSERTION: Primary | ICD-10-CM

## 2022-06-23 DIAGNOSIS — Z11.3 SCREENING FOR STDS (SEXUALLY TRANSMITTED DISEASES): ICD-10-CM

## 2022-06-23 PROCEDURE — 58300 INSERT INTRAUTERINE DEVICE: CPT | Performed by: NURSE PRACTITIONER

## 2022-06-23 PROCEDURE — 3008F BODY MASS INDEX DOCD: CPT | Performed by: INTERNAL MEDICINE

## 2022-06-23 PROCEDURE — 87491 CHLMYD TRACH DNA AMP PROBE: CPT | Performed by: NURSE PRACTITIONER

## 2022-06-23 PROCEDURE — 87591 N.GONORRHOEAE DNA AMP PROB: CPT | Performed by: NURSE PRACTITIONER

## 2022-06-23 NOTE — PROGRESS NOTES
Patient presents for: Mirena    Menarche-  14  Last Pap Smear- Not on file none as of yet  Hx of abnormal paps- none as of yet  Birth control- none    Mammogram- Not on file  DXA-none  Colonoscopy- none    Smoking status- never  Last sexual activity- yes  Family history of uterine, ovarian, cervical or breast cancer-  Paternal grandmother had uterine  Concerns-  none

## 2022-06-23 NOTE — PROGRESS NOTES
Iud insertions    Date/Time: 6/23/2022 1:31 PM  Performed by: MAYRA Badillo  Authorized by: MAYRA Badillo   Universal Protocol:  Consent: Verbal consent obtained  Risks and benefits: risks, benefits and alternatives were discussed  Consent given by: patient  Timeout called at: 6/23/2022 1:31 PM   Patient understanding: patient states understanding of the procedure being performed  Patient consent: the patient's understanding of the procedure matches consent given  Procedure consent: procedure consent matches procedure scheduled  Relevant documents: relevant documents present and verified  Test results: test results available and properly labeled  Required items: required blood products, implants, devices, and special equipment available  Patient identity confirmed: verbally with patient        Procedure:     Pelvic exam performed: yes      Negative GC/chlamydia test: yes      Negative urine pregnancy test: yes      Cervix cleaned and prepped: yes      Speculum placed in vagina: yes      Tenaculum applied to cervix: yes      Uterus sounded: yes      Uterus sound depth (cm):  6 5    IUD inserted with no complications: yes      IUD type:  Mirena (Patient is being worked up for lupus  She was told she is hypercoagulable and she should get off OCP)    Strings trimmed: yes    Post-procedure:     Patient tolerated procedure well: yes    Comments:      Return in 3 months for menses recheck  All questions answered

## 2022-06-27 LAB
C TRACH DNA SPEC QL NAA+PROBE: NEGATIVE
N GONORRHOEA DNA SPEC QL NAA+PROBE: NEGATIVE

## 2022-07-12 ENCOUNTER — TELEMEDICINE (OUTPATIENT)
Dept: BEHAVIORAL/MENTAL HEALTH CLINIC | Facility: CLINIC | Age: 22
End: 2022-07-12
Payer: COMMERCIAL

## 2022-07-12 DIAGNOSIS — F41.1 GENERALIZED ANXIETY DISORDER: ICD-10-CM

## 2022-07-12 DIAGNOSIS — F32.1 CURRENT MODERATE EPISODE OF MAJOR DEPRESSIVE DISORDER WITHOUT PRIOR EPISODE (HCC): Primary | ICD-10-CM

## 2022-07-12 PROCEDURE — 90834 PSYTX W PT 45 MINUTES: CPT | Performed by: SOCIAL WORKER

## 2022-07-12 NOTE — PSYCH
Psychotherapy Provided: Individual Psychotherapy 50 minutes     Length of time in session: 50 minutes, follow up in 2 week    Encounter Diagnosis     ICD-10-CM    1  Current moderate episode of major depressive disorder without prior episode ([facility] [address] )  F32 1    2  Generalized anxiety disorder  F41 1        Goals addressed in session: -    Pain:      none    0    Current suicide risk : Low     D:  Met with Brenda for session  Reviewed hx and developed tx plan  Since last session she went on vacation with her parents, siblings and boyfriend  It went ok  Discussed issues she had with her one employer  A:  Open and active in the development of her tx plan  Appears to have insight  P:  To begin addressing  tx plan goals  Behavioral Health Treatment Plan [facility]: Diagnosis and Treatment Plan explained to Cheyanne Bailey relates understanding diagnosis and is agreeable to Treatment Plan  Yes     Virtual Regular Visit    Verification of patient location:    Patient is located in the following state in which I hold an active license [place]      Assessment/Plan:    Problem List Items Addressed This Visit        Other    Generalized anxiety disorder    Current moderate episode of major depressive disorder without prior episode ([facility] ) - Primary          Goals addressed in session: -         Reason for visit is   Chief Complaint   Patient presents with    Virtual Regular Visit        Encounter provider Mickey See    Provider located at [address] [place] [address]  [address] [facility] [address] [phone]      Recent Visits  No visits were found meeting these conditions    Showing recent visits within past 7 days and meeting all other requirements  Today's Visits  Date Type Provider Dept   07/12/22 [address] [place] [address]   Showing today's visits and meeting all other requirements  Future Appointments  No visits were found meeting these conditions  Showing future appointments within next 150 days and meeting all other requirements       The patient was identified by name and date of birth  Reba Nick was informed that this is a telemedicine visit and that the visit is being conducted through[employer] and patient was informed that this is a secure, HIPAA-compliant platform  She agrees to proceed     My office door was closed  No one else was in the room  She acknowledged consent and understanding of privacy and security of the video platform  The patient has agreed to participate and understands they can discontinue the visit at any time  Patient is aware this is a billable service  Subjective  Brenda Law is a 25 y o  female    HPI     Past Medical History:   Diagnosis Date    Anxiety     Chronic vaginitis 3/15/2017    Concussion     Last Assessed: 11/15/2017    Depression        Past Surgical History:   Procedure Laterality Date    APPENDECTOMY         Current Outpatient Medications   Medication Sig Dispense Refill    albuterol (PROVENTIL HFA,VENTOLIN HFA) 90 mcg/act inhaler Inhale 1-2 puffs  0    dicyclomine (BENTYL) 20 mg tablet Take 1 tablet (20 mg total) by mouth every 6 (six) hours 60 tablet 0    NIFEdipine (PROCARDIA) 10 mg capsule Take 1 capsule (10 mg total) by mouth in the morning 30 capsule 0    nystatin (MYCOSTATIN) ointment Apply topically 2 (two) times a day 45 g 0    triamcinolone (KENALOG) 0 1 % cream Apply topically twice a day as needed for itch 30 g 1    venlafaxine (EFFEXOR-XR) 75 mg 24 hr capsule Take 1 capsule (75 mg total) by mouth daily 90 capsule 1     No current facility-administered medications for this visit  No Known Allergies    Review of Systems    Video Exam    There were no vitals filed for this visit      Physical Exam     I spent 50 minutes directly with the patient during this visit    VIRTUAL VISIT DISCLAIMER    Brenda Law verbally agrees to participate in [employer]  Pt is aware that [employer] could be limited without vital signs or the ability to perform a full hands-on physical exam  Brenda Alanis understands she or the provider may request at any time to terminate the video visit and request the patient to seek care or treatment in person

## 2022-07-12 NOTE — BH TREATMENT PLAN
Renetta Issa  2000       Date of Initial Treatment Plan: 7/12/22  Date of Current Treatment Plan: 07/12/22    Treatment Plan Number 1     Strengths/Personal Resources for Self Care: hard working, loyal     Diagnosis:   1  Current moderate episode of major depressive disorder without prior episode ([address] )     2  Generalized anxiety disorder         Area of Needs:I have gone through lots of friends  I have anxiety  Long Term Goal 1: I want to be a better friend  Target Date: 2/23  Completion Date: N/A         Short Term Objectives for Goal 1: I will reach out and check in with one friends once a week  I will ask someone once every other week to go and do something  I will listen to my friends and get to know them better  Long Term Goal 2: I want to decrease anxeity  Target Date: 2/23  Completion Date: N/A    Short Term Objectives for Goal 2: I will write a list of thingsI want to accomplish for the day  I will take deep breaths, talk to others, listen to music  I will practice mindfulness and see where I am at  I will list 5 things I see, 4 things I hear, 3 things I touch/feel,  2 things I smell and 1 thing I taste  I will take my meds as prescribed  Long Term Goal # 3: N/A     Target Date: N/A  Completion Date: N/A    Short Term Objectives for Goal 3: N/A    GOAL 1: Modality: Individual 2x per month   Completion Date NA and The person(s) responsible for carrying out the plan is  Brenda  GOAL 2: Modality: Individual 2x per month   Completion Date NA and The person(s) responsible for carrying out the plan is  Brenda  GOAL 3: Modality:NA       Behavioral Health Treatment Plan [facility]: Diagnosis and Treatment Plan explained to Cheyanne Bailey relates understanding diagnosis and is agreeable to Treatment Plan         Client Comments : Please share your thoughts, feelings, need and/or experiences regarding your treatment plan:     BINDU AlamoW    7/12/22 @5:00pm    Shaun Salomon, 2000, actively participated in the review and update of this treatment plan during a virtual session, using the [employer] platform  Shaun Ferminpshire  provided verbal consent on 7/12/2022 at 5:00 PM  The treatment plan was transcribed into the [employer] Record at a later time                                                              therapist

## 2022-08-19 ENCOUNTER — TELEMEDICINE (OUTPATIENT)
Dept: BEHAVIORAL/MENTAL HEALTH CLINIC | Facility: CLINIC | Age: 22
End: 2022-08-19
Payer: COMMERCIAL

## 2022-08-19 DIAGNOSIS — F32.1 CURRENT MODERATE EPISODE OF MAJOR DEPRESSIVE DISORDER WITHOUT PRIOR EPISODE (HCC): Primary | ICD-10-CM

## 2022-08-19 DIAGNOSIS — F41.1 GENERALIZED ANXIETY DISORDER: ICD-10-CM

## 2022-08-19 PROCEDURE — 90834 PSYTX W PT 45 MINUTES: CPT | Performed by: SOCIAL WORKER

## 2022-08-19 NOTE — PSYCH
Psychotherapy Provided: Individual Psychotherapy 50 minutes     Length of time in session: 50 minutes, follow up in 2 week    Encounter Diagnosis     ICD-10-CM    1  Current moderate episode of major depressive disorder without prior episode ([facility] [address] )  F32 1    2  Generalized anxiety disorder  F41 1        Goals addressed in session: 1,2    Pain:      none    0    Current suicide risk : Low     D:  Met with Brenda for session  She discussed her one friend and how they made up  She helped him out  She is getting ready for school to start  She has 3 jobs now and will have 4 jobs when school starts which will equal 32-40 hrs per week  Discussed balancing that work and school  Some of the jobs she will be able to do her homework at  Her relationship with her boyfriend is good  Her anxiety "has been ok "  Reviewed tx plan goals  Some anxiety over her health  She had the blood work down and find out the results regarding if she has Lupus or not next work  Had to change her birthcontrol due to increased risk of blood clots  Discussed the side effects of that  A:  Mod progress on goals  P:  To continue addressing  tx plan goals  Behavioral Health Treatment Plan [facility]: Diagnosis and Treatment Plan explained to Sherrie Ely relates understanding diagnosis and is agreeable to Treatment Plan  Yes     Virtual Regular Visit    Verification of patient location:    Patient is located in the following state in which I hold an active license [place]      Assessment/Plan:    Problem List Items Addressed This Visit    None         Goals addressed in session: -         Reason for visit is   No chief complaint on file  Encounter provider Savana Mejia    Provider located at [address] [place] [address]  [facility] [address]  [phone]      Recent Visits  No visits were found meeting these conditions    Showing recent visits within past 7 days and meeting all other requirements  Future Appointments  No visits were found meeting these conditions  Showing future appointments within next 150 days and meeting all other requirements       The patient was identified by name and date of birth  Lenka Postal was informed that this is a telemedicine visit and that the visit is being conducted through[employer] [facility] and patient was informed that this is a secure, HIPAA-compliant platform  She agrees to proceed     My office door was closed  No one else was in the room  She acknowledged consent and understanding of privacy and security of the video platform  The patient has agreed to participate and understands they can discontinue the visit at any time  Patient is aware this is a billable service  Subjective  Brenda Oleary is a 25 y o  female    HPI     Past Medical History:   Diagnosis Date    Anxiety     Chronic vaginitis 3/15/2017    Concussion     Last Assessed: 11/15/2017    Depression        Past Surgical History:   Procedure Laterality Date    APPENDECTOMY         Current Outpatient Medications   Medication Sig Dispense Refill    albuterol (PROVENTIL HFA,VENTOLIN HFA) 90 mcg/act inhaler Inhale 1-2 puffs  0    dicyclomine (BENTYL) 20 mg tablet Take 1 tablet (20 mg total) by mouth every 6 (six) hours 60 tablet 0    NIFEdipine (PROCARDIA) 10 mg capsule Take 1 capsule (10 mg total) by mouth in the morning 30 capsule 0    nystatin (MYCOSTATIN) ointment Apply topically 2 (two) times a day 45 g 0    triamcinolone (KENALOG) 0 1 % cream Apply topically twice a day as needed for itch 30 g 1    venlafaxine (EFFEXOR-XR) 75 mg 24 hr capsule Take 1 capsule (75 mg total) by mouth daily 90 capsule 1     No current facility-administered medications for this visit  No Known Allergies    Review of Systems    Video Exam    There were no vitals filed for this visit      Physical Exam     I spent 50 minutes directly with the patient during this visit    VIRTUAL VISIT DISCLAIMER    Brenda Stiles verbally agrees to participate in [employer]  Pt is aware that [employer] could be limited without vital signs or the ability to perform a full hands-on physical exam  Brenda Lainez understands she or the provider may request at any time to terminate the video visit and request the patient to seek care or treatment in person

## 2022-08-23 ENCOUNTER — TELEPHONE (OUTPATIENT)
Dept: PSYCHIATRY | Facility: CLINIC | Age: 22
End: 2022-08-23

## 2022-08-23 ENCOUNTER — OFFICE VISIT (OUTPATIENT)
Dept: PSYCHIATRY | Facility: CLINIC | Age: 22
End: 2022-08-23
Payer: COMMERCIAL

## 2022-08-23 VITALS
BODY MASS INDEX: 22.85 KG/M2 | WEIGHT: 129 LBS | SYSTOLIC BLOOD PRESSURE: 118 MMHG | HEART RATE: 81 BPM | DIASTOLIC BLOOD PRESSURE: 76 MMHG

## 2022-08-23 DIAGNOSIS — F32.1 CURRENT MODERATE EPISODE OF MAJOR DEPRESSIVE DISORDER WITHOUT PRIOR EPISODE (HCC): ICD-10-CM

## 2022-08-23 DIAGNOSIS — F41.1 GENERALIZED ANXIETY DISORDER: Primary | ICD-10-CM

## 2022-08-23 PROCEDURE — 99214 OFFICE O/P EST MOD 30 MIN: CPT | Performed by: STUDENT IN AN ORGANIZED HEALTH CARE EDUCATION/TRAINING PROGRAM

## 2022-08-23 PROCEDURE — 90833 PSYTX W PT W E/M 30 MIN: CPT | Performed by: STUDENT IN AN ORGANIZED HEALTH CARE EDUCATION/TRAINING PROGRAM

## 2022-08-23 PROCEDURE — 3725F SCREEN DEPRESSION PERFORMED: CPT | Performed by: STUDENT IN AN ORGANIZED HEALTH CARE EDUCATION/TRAINING PROGRAM

## 2022-08-23 RX ORDER — DROSPIRENONE AND ETHINYL ESTRADIOL 0.02-3(28)
1 KIT ORAL DAILY
COMMUNITY
Start: 2022-06-29 | End: 2022-09-27 | Stop reason: ALTCHOICE

## 2022-08-23 RX ORDER — VENLAFAXINE HYDROCHLORIDE 75 MG/1
75 CAPSULE, EXTENDED RELEASE ORAL DAILY
Qty: 90 CAPSULE | Refills: 1 | Status: SHIPPED | OUTPATIENT
Start: 2022-08-23

## 2022-08-23 NOTE — TELEPHONE ENCOUNTER
----- Message from Sarah Cote MD sent at 8/23/2022 11:24 AM EDT -----  Please schedule a JAMES visit for patient with resident in Oct/Nov time-frame

## 2022-08-23 NOTE — TELEPHONE ENCOUNTER
Writer left a message  For pt to give our office call back at earliest convenience to schedule a 3 month follow-up per  Dr Nohemy Maldonado check out notes    Thank you

## 2022-08-25 ENCOUNTER — APPOINTMENT (OUTPATIENT)
Dept: LAB | Facility: CLINIC | Age: 22
End: 2022-08-25
Payer: COMMERCIAL

## 2022-08-25 DIAGNOSIS — R76.0 LUPUS ANTICOAGULANT POSITIVE: ICD-10-CM

## 2022-08-25 DIAGNOSIS — B35.3 TINEA PEDIS OF BOTH FEET: ICD-10-CM

## 2022-08-25 LAB
ALBUMIN SERPL BCP-MCNC: 4.3 G/DL (ref 3.5–5)
ALP SERPL-CCNC: 98 U/L (ref 46–116)
ALT SERPL W P-5'-P-CCNC: 45 U/L (ref 12–78)
ANION GAP SERPL CALCULATED.3IONS-SCNC: 5 MMOL/L (ref 4–13)
AST SERPL W P-5'-P-CCNC: 21 U/L (ref 5–45)
BILIRUB SERPL-MCNC: 0.52 MG/DL (ref 0.2–1)
BUN SERPL-MCNC: 7 MG/DL (ref 5–25)
CALCIUM SERPL-MCNC: 9.4 MG/DL (ref 8.3–10.1)
CHLORIDE SERPL-SCNC: 107 MMOL/L (ref 96–108)
CO2 SERPL-SCNC: 27 MMOL/L (ref 21–32)
CREAT SERPL-MCNC: 0.82 MG/DL (ref 0.6–1.3)
GFR SERPL CREATININE-BSD FRML MDRD: 102 ML/MIN/1.73SQ M
GLUCOSE P FAST SERPL-MCNC: 85 MG/DL (ref 65–99)
POTASSIUM SERPL-SCNC: 4.3 MMOL/L (ref 3.5–5.3)
PROT SERPL-MCNC: 7.6 G/DL (ref 6.4–8.4)
SODIUM SERPL-SCNC: 139 MMOL/L (ref 135–147)

## 2022-08-25 PROCEDURE — 85670 THROMBIN TIME PLASMA: CPT

## 2022-08-25 PROCEDURE — 85613 RUSSELL VIPER VENOM DILUTED: CPT

## 2022-08-25 PROCEDURE — 85732 THROMBOPLASTIN TIME PARTIAL: CPT

## 2022-08-25 PROCEDURE — 36415 COLL VENOUS BLD VENIPUNCTURE: CPT

## 2022-08-25 PROCEDURE — 85705 THROMBOPLASTIN INHIBITION: CPT

## 2022-08-25 PROCEDURE — 80053 COMPREHEN METABOLIC PANEL: CPT

## 2022-08-29 ENCOUNTER — OFFICE VISIT (OUTPATIENT)
Dept: RHEUMATOLOGY | Facility: CLINIC | Age: 22
End: 2022-08-29
Payer: COMMERCIAL

## 2022-08-29 ENCOUNTER — TELEPHONE (OUTPATIENT)
Dept: FAMILY MEDICINE CLINIC | Facility: CLINIC | Age: 22
End: 2022-08-29

## 2022-08-29 VITALS
WEIGHT: 130 LBS | SYSTOLIC BLOOD PRESSURE: 123 MMHG | DIASTOLIC BLOOD PRESSURE: 75 MMHG | RESPIRATION RATE: 19 BRPM | BODY MASS INDEX: 23.04 KG/M2 | HEIGHT: 63 IN | HEART RATE: 65 BPM | TEMPERATURE: 98.2 F

## 2022-08-29 DIAGNOSIS — K12.1 MOUTH ULCERS: ICD-10-CM

## 2022-08-29 DIAGNOSIS — R76.8 POSITIVE ANA (ANTINUCLEAR ANTIBODY): ICD-10-CM

## 2022-08-29 DIAGNOSIS — R76.0 LUPUS ANTICOAGULANT POSITIVE: ICD-10-CM

## 2022-08-29 DIAGNOSIS — M25.50 DIFFUSE ARTHRALGIA: ICD-10-CM

## 2022-08-29 DIAGNOSIS — T69.1XXD CHILBLAINS, SUBSEQUENT ENCOUNTER: Primary | ICD-10-CM

## 2022-08-29 PROCEDURE — 99215 OFFICE O/P EST HI 40 MIN: CPT | Performed by: INTERNAL MEDICINE

## 2022-08-29 NOTE — TELEPHONE ENCOUNTER
----- Message from [email or web address] sent at 8/29/2022 11:58 AM EDT -----  Please call patient to make aware that lab work was normal, thank you

## 2022-08-30 LAB
APTT SCREEN TO CONFIRM RATIO: 1.04 RATIO (ref 0–1.34)
CONFIRM APTT/NORMAL: 41.6 SEC (ref 0–47.6)
LA PPP-IMP: NORMAL
SCREEN APTT: 46.3 SEC (ref 0–51.9)
SCREEN DRVVT: 43.2 SEC (ref 0–47)
THROMBIN TIME: 17.3 SEC (ref 0–23)

## 2022-09-06 ENCOUNTER — TELEPHONE (OUTPATIENT)
Dept: BEHAVIORAL/MENTAL HEALTH CLINIC | Facility: CLINIC | Age: 22
End: 2022-09-06

## 2022-09-06 NOTE — TELEPHONE ENCOUNTER
Called pt and lvm to informed her that 9/20/22 appt will be cx due to provider wont be in the office

## 2022-09-09 ENCOUNTER — TELEMEDICINE (OUTPATIENT)
Dept: BEHAVIORAL/MENTAL HEALTH CLINIC | Facility: CLINIC | Age: 22
End: 2022-09-09

## 2022-09-09 DIAGNOSIS — F32.1 CURRENT MODERATE EPISODE OF MAJOR DEPRESSIVE DISORDER WITHOUT PRIOR EPISODE (HCC): Primary | ICD-10-CM

## 2022-09-09 DIAGNOSIS — F41.1 GENERALIZED ANXIETY DISORDER: ICD-10-CM

## 2022-09-27 ENCOUNTER — OFFICE VISIT (OUTPATIENT)
Dept: OBGYN CLINIC | Age: 22
End: 2022-09-27
Payer: COMMERCIAL

## 2022-09-27 VITALS
SYSTOLIC BLOOD PRESSURE: 98 MMHG | HEIGHT: 63 IN | BODY MASS INDEX: 23.39 KG/M2 | WEIGHT: 132 LBS | DIASTOLIC BLOOD PRESSURE: 82 MMHG

## 2022-09-27 DIAGNOSIS — Z30.431 SURVEILLANCE OF INTRAUTERINE CONTRACEPTION: Primary | ICD-10-CM

## 2022-09-27 DIAGNOSIS — L70.9 ACNE, UNSPECIFIED ACNE TYPE: ICD-10-CM

## 2022-09-27 PROCEDURE — 99213 OFFICE O/P EST LOW 20 MIN: CPT | Performed by: NURSE PRACTITIONER

## 2022-09-27 RX ORDER — CLINDAMYCIN PHOSPHATE 11.9 MG/ML
SOLUTION TOPICAL 2 TIMES DAILY
Qty: 30 ML | Refills: 0 | Status: SHIPPED | OUTPATIENT
Start: 2022-09-27

## 2022-09-27 RX ORDER — CLINDAMYCIN PHOSPHATE 11.9 MG/ML
SOLUTION TOPICAL 2 TIMES DAILY
Qty: 30 ML | Refills: 0 | Status: SHIPPED | OUTPATIENT
Start: 2022-09-27 | End: 2022-09-27

## 2022-09-27 NOTE — PROGRESS NOTES
Patient presents for:3 month follow up for mirena     Menarche- 14Last Pap Smear- Not on file  Hx of abnormal paps- not on file  Birth control-MIrena    Mammogram- Not on file  DXA- not on file   Colonoscopy- not on file     Smoking status- never  Last sexual activity- yes  Family history of uterine, ovarian, cervical or breast cancer-  Paternal grandmother uterine cancer   Concerns-    Pt feels that her acne has gotten worst with the IUD   Acne is worst on her back and chest and it starting to become an issue for her

## 2022-09-27 NOTE — PROGRESS NOTES
Diagnoses and all orders for this visit:    Surveillance of intrauterine contraception    Acne, unspecified acne type  -     clindamycin (CLEOCIN T) 1 % external solution; Apply topically 2 (two) times a day  -     Ambulatory Referral to Dermatology; Future    Other orders  -     CONTINUE Levonorgestrel (MIRENA) 20 MCG/DAY IUD; 1 each by Intrauterine route once        Call if no symptom improvement, all questions answered, return for annual 4/2023  Advised non-comedogenic makeup and Dial antibacterial soap  Pleasant 25 y o  here for IUD string check  She states she is not having issues with her menses but she did have a spotty menses this past week  She denies dyspareunia if she uses lubricant  She denies pelvic pain  She denies vaginal issues  She is UTD on her GYN exams  She is overall happy with her IUD except that she is having more cystic acne on her face, chest and back  She did see a dermatologist years ago for the same issue  She agrees to go back to see Derm again but I agreed to put her on Cleocin T topical until then         Past Medical History:   Diagnosis Date    Anxiety     Chronic vaginitis 3/15/2017    Concussion     Last Assessed: 11/15/2017    Depression      Past Surgical History:   Procedure Laterality Date    APPENDECTOMY      INSERTION OF INTRAUTERINE DEVICE (IUD) N/A      Social History     Tobacco Use    Smoking status: Never Smoker    Smokeless tobacco: Never Used    Tobacco comment: Vape   Vaping Use    Vaping Use: Former    Substances: Nicotine, Flavoring   Substance Use Topics    Alcohol use: Yes     Comment: social    Drug use: No     Family History   Problem Relation Age of Onset    Restless legs syndrome Mother     Depression Mother     Depression Father     Colon cancer Paternal Grandmother     Uterine cancer Paternal Grandmother     OCD Sister     Anxiety disorder Brother     Hashimoto's thyroiditis Maternal Grandmother     Breast cancer Neg Hx  Ovarian cancer Neg Hx     Cervical cancer Neg Hx        Current Outpatient Medications:     albuterol (PROVENTIL HFA,VENTOLIN HFA) 90 mcg/act inhaler, Inhale 1-2 puffs, Disp: , Rfl: 0    clindamycin (CLEOCIN T) 1 % external solution, Apply topically 2 (two) times a day, Disp: 30 mL, Rfl: 0    dicyclomine (BENTYL) 20 mg tablet, Take 1 tablet (20 mg total) by mouth every 6 (six) hours, Disp: 60 tablet, Rfl: 0    Levonorgestrel (MIRENA) 20 MCG/DAY IUD, 1 each by Intrauterine route once, Disp: , Rfl:     venlafaxine (EFFEXOR-XR) 75 mg 24 hr capsule, Take 1 capsule (75 mg total) by mouth daily, Disp: 90 capsule, Rfl: 1    No Known Allergies  OB History    Para Term  AB Living   0 0 0 0 0 0   SAB IAB Ectopic Multiple Live Births   0 0 0 0 0       Vitals:    22 1447   BP: 98/82   Weight: 59 9 kg (132 lb)   Height: 5' 3" (1 6 m)     Body mass index is 23 38 kg/m²  Patient's last menstrual period was 2022 (exact date)  Review of Systems   Constitutional: Negative for chills, fatigue, fever and unexpected weight change  Respiratory: Negative for shortness of breath  Gastrointestinal: Negative for anal bleeding, blood in stool, constipation and diarrhea  Genitourinary: Negative for difficulty urinating, dysuria and hematuria  Physical Exam   Constitutional: She appears well-developed and well-nourished  No distress  Alert and oriented  HENT: atraumatic, minor facial acne noted  Head: Normocephalic  Neck: Normal range of motion  Neck supple  Pulmonary: Effort normal   Abdominal: Soft  Pelvic exam was performed with patient supine  No labial fusion  There is no rash, tenderness, lesion or injury on the right labia  There is no rash, tenderness, lesion or injury on the left labia  Urethral meatus does not show any tenderness, inflammation or discharge  Palpation of midline bladder without pain or discomfort  Uterus is not deviated, not enlarged, not fixed and not tender  Cervix exhibits no motion tenderness, no discharge and no friability  Right adnexum displays no mass, no tenderness and no fullness  Left adnexum displays no mass, no tenderness and no fullness  No erythema or tenderness in the vagina  No foreign body in the vagina  No signs of injury around the vagina  NO Vaginal discharge found  Perineum and anus without areas of injury  No lesions noted or swelling  IUD strings seen from OS  Lymphadenopathy:        Right: No inguinal adenopathy present  Left: No inguinal adenopathy present

## 2022-09-27 NOTE — PATIENT INSTRUCTIONS
Acne   AMBULATORY CARE:   Acne  is a skin condition that is common in adolescents  Acne usually gets better over time, but may continue into adulthood for some people  Different types of acne:  Acne most often appears on the face, neck, upper chest, back, and upper arms  Whiteheads  are closed, white bumps that form when the pore is completely blocked  Blackheads  are tiny, dark spots that form when the pore is blocked but stays open  Pimples  are inflamed bumps that contain pus  They are often caused by clogged pores  Pimples develop when whiteheads or blackheads get infected  Cystic acne  is made up of large inflamed nodules or cysts that contain pus  They look like large pimples and form deep inside the skin  They may cause pain and scars  Call your doctor if:   You use retinoid medicine and you think you might be pregnant  You use retinoid medicine and begin to have mood swings or personality changes  You feel depressed  You have a fever and inflammation of your skin  Your acne does not get better, even after treatment  You have questions or concerns about your condition or care  Treatment  depends on how severe your acne is  Your healthcare provider may recommend any of the following:  Over-the-counter acne medicines  with benzoyl peroxide and salicylic acid may help to treat mild acne  They are available in the form of gels, lotions, creams, pads, or soaps  It may take several weeks for you to see an improvement  Follow the directions on the medicine label  Do not use more than directed  This medicine can cause dry and red skin if you use too much or use it too often  Prescription medicines  may be needed if over-the-counter medicines do not help after 2 months  You may need to take more than one kind of medicine to treat your acne  A type of prescription acne medicine called retinoids may cause serious birth defects   Do not  use this medicine if you are pregnant or may become pregnant  Light therapy  may help decrease your acne  Ask your healthcare provider for more information about light therapy  Manage or prevent acne:   Wash your face 2 times a day  with a gentle cleanser  This helps decrease oil buildup that leads to acne  Also wash your face if you have been sweating a lot, such as after exercise  Use oil-free products  This includes sunscreen, moisturizers, and cosmetics  Hair products should also be oil-free  Wash your hair regularly  to decrease oil  Oily hair that touches your face can increase acne  Avoid touching your face  as much as possible  Do not pick, squeeze, or pop your pimples  This can make your acne worse because your hands contain oil  It can also cause scars to form on your face  Avoid things that rub against your skin  as much as possible  This includes hats, helmets, and backpacks  Follow up with your doctor as directed:  Write down your questions so you remember to ask them during your visits  © Copyright [employer] 2022 Information is for End User's use only and may not be sold, redistributed or otherwise used for commercial purposes  All illustrations and images included in CareNotes® are the copyrighted property of [employer]  or Justyn Bernal   The above information is an  only  It is not intended as medical advice for individual conditions or treatments  Talk to your doctor, nurse or pharmacist before following any medical regimen to see if it is safe and effective for you

## 2022-10-04 ENCOUNTER — TELEMEDICINE (OUTPATIENT)
Dept: BEHAVIORAL/MENTAL HEALTH CLINIC | Facility: CLINIC | Age: 22
End: 2022-10-04
Payer: COMMERCIAL

## 2022-10-04 DIAGNOSIS — F32.1 CURRENT MODERATE EPISODE OF MAJOR DEPRESSIVE DISORDER WITHOUT PRIOR EPISODE (HCC): Primary | ICD-10-CM

## 2022-10-04 DIAGNOSIS — F41.1 GENERALIZED ANXIETY DISORDER: ICD-10-CM

## 2022-10-04 PROCEDURE — 90834 PSYTX W PT 45 MINUTES: CPT | Performed by: SOCIAL WORKER

## 2022-10-07 NOTE — PSYCH
Psychotherapy Provided: Individual Psychotherapy 50 minutes     Length of time in session: 50 minutes, follow up in 2 week    Encounter Diagnosis     ICD-10-CM    1  Current moderate episode of major depressive disorder without prior episode ([facility] [address] )  F32 1    2  Generalized anxiety disorder  F41 1        Goals addressed in session: 1,2    Pain:      none    0    Current suicide risk : Low     D:  Met with Brenda for session  She is joggling 4 jobs and school  She is doing her homework during a couple of jobs  So far she is managing  She is also trying to spend time with her friends when she can  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  Discussed coping skills for the "little things" that are not scheduled in such as household chores, friends, boyfriend, etc  A:  Mod progress on goals  P:  To continue addressing  tx plan goals  Behavioral Health Treatment Plan [facility]: Diagnosis and Treatment Plan explained to Calvin Smith relates understanding diagnosis and is agreeable to Treatment Plan  Yes     Virtual Regular Visit    Verification of patient location:    Patient is located in the following state in which I hold an active license [place]      Assessment/Plan:    Problem List Items Addressed This Visit    None         Goals addressed in session: -         Reason for visit is   No chief complaint on file  Encounter provider Otf Hooks    Provider located at [address] [place] [address] Jhonny Jessie Gonzalez [facility] [address] [address] [phone]      Recent Visits  No visits were found meeting these conditions  Showing recent visits within past 7 days and meeting all other requirements  Future Appointments  No visits were found meeting these conditions  Showing future appointments within next 150 days and meeting all other requirements       The patient was identified by name and date of birth   Judit Pena was informed that this is a telemedicine visit and that the [facility] and patient was informed that this is a secure, HIPAA-compliant platform  She agrees to proceed     My office door was closed  No one else was in the room  She acknowledged consent and understanding of privacy and security of the video platform  The patient has agreed to participate and understands they can discontinue the visit at any time  Patient is aware this is a billable service  Subjective  Brenda Griffith is a 25 y o  female    HPI     Past Medical History:   Diagnosis Date    Anxiety     Chronic vaginitis 3/15/2017    Concussion     Last Assessed: 11/15/2017    Depression        Past Surgical History:   Procedure Laterality Date    APPENDECTOMY      INSERTION OF INTRAUTERINE DEVICE (IUD) N/A        Current Outpatient Medications   Medication Sig Dispense Refill    albuterol (PROVENTIL HFA,VENTOLIN HFA) 90 mcg/act inhaler Inhale 1-2 puffs  0    clindamycin (CLEOCIN T) 1 % external solution Apply topically 2 (two) times a day 30 mL 0    dicyclomine (BENTYL) 20 mg tablet Take 1 tablet (20 mg total) by mouth every 6 (six) hours 60 tablet 0    Levonorgestrel (MIRENA) 20 MCG/DAY IUD 1 each by Intrauterine route once      venlafaxine (EFFEXOR-XR) 75 mg 24 hr capsule Take 1 capsule (75 mg total) by mouth daily 90 capsule 1     No current facility-administered medications for this visit  No Known Allergies    Review of Systems    Video Exam    There were no vitals filed for this visit  Physical Exam     I spent 50 minutes directly with the patient during this visit    [address] verbally agrees to participate in [employer]   Pt is aware that [employer] could be limited without vital signs or the ability to perform a full hands-on physical exam  Brenda Walker understands she or the provider may request at any time to terminate the video visit and request the patient to seek care or treatment in person

## 2022-10-21 ENCOUNTER — TELEMEDICINE (OUTPATIENT)
Dept: BEHAVIORAL/MENTAL HEALTH CLINIC | Facility: CLINIC | Age: 22
End: 2022-10-21
Payer: COMMERCIAL

## 2022-10-21 DIAGNOSIS — F41.1 GENERALIZED ANXIETY DISORDER: ICD-10-CM

## 2022-10-21 DIAGNOSIS — F32.1 CURRENT MODERATE EPISODE OF MAJOR DEPRESSIVE DISORDER WITHOUT PRIOR EPISODE (HCC): Primary | ICD-10-CM

## 2022-10-21 PROCEDURE — 90834 PSYTX W PT 45 MINUTES: CPT | Performed by: SOCIAL WORKER
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
## 2022-10-25 NOTE — PSYCH
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 , and babysitting 3 days/week), domiciled with boyfriend since 2019 in [place] , with [facility] of [facility], EMILY, r/o social anxiety disorder and ADHD, previous vaping use, no IPH, no SIB, recently restarted psychotherapy with Ines Barrett monthly, PMH of IBS, referred initially by PCP, transfer of care from [facility], with suicide risk factors including personal history of aborted suicide attempt to hang self in 2017, history of aggression (during teenage years with siblings), access to lethal means (boyfriend's firearms locked and stored away from patient's access), chronic mental illness, chronic pain, recent psychosocial stressors including school/work stressors and relationships, anxiety, history of trauma, age ([age]) and never , presenting in person to the [address] [place] [address] [address] outpatient clinic for a full psychiatric intake assessment including evaluation for medication and psychotherapy  Brenda reports mood as "anxious" due to new psychiatry appointment  Patient reported depression as 2/10 and anxiety as 4/10  Patient reports no significant complaints but admits to being stressed due to amount of work she does but feels stable on current medication and treatment plan including monthly therapy  Patient reports sleeping on average 8 hours a night   Patient reports stressors including work with full-time student, trying to support friend who is in a bad family situation but causing stress at home, and also needing to talk to boyfriend about a conversation between his father and patient, which was stressful  Patient reports no nightmares or flashbacks but can be triggered and put on edge if talking about past trauma such as "whatever you want, Brenda " No recent or current passive or active SI/HI/AVH, no overt delusions, no history or current symptoms to suggest hypomania or sarika, no panic attacks or crying spells recently, no OCD-like symptoms, and no disordered eating  PHQ-9 Depression Screening    Little interest or pleasure in doing things: 1 - several days  Feeling down, depressed, or hopeless: 1 - several days  Trouble falling or staying asleep, or sleeping too much: 2 - more than half the days  Feeling tired or having little energy: 3 - nearly every day  Poor appetite or overeatin - not at all  Feeling bad about yourself - or that you are a failure or have let yourself or your family down: 0 - not at all  Trouble concentrating on things, such as reading the newspaper or watching television: 2 - more than half the days  Moving or speaking so slowly that other people could have noticed  Or the opposite - being so fidgety or restless that you have been moving around a lot more than usual: 3 - nearly every day  Thoughts that you would be better off dead, or of hurting yourself in some way: 0 - not at all  PHQ-9 Score: 12  Score Interpretation: Moderate depression       EMILY-7 Flowsheet Screening    Flowsheet Row Most Recent Value   Over the last 2 weeks, how often have you been bothered by any of the following problems?     Feeling nervous, anxious, or on edge 2   Not being able to stop or control worrying 2   Worrying too much about different things 2   Trouble relaxing 2   Being so restless that it is hard to sit still 3   Becoming easily annoyed or irritable 2   Feeling afraid as if something awful might happen 0   EMILY-7 Total Score 13          Psychiatric Review Of Systems:  • Brenda reports Symptoms as described in HPI  • Brenda denies Significantly depressed mood, Appetite changes, Current suicidal thoughts, plan, or intent, Current thoughts of self-harm, Current homicidal thoughts, plan, or intent, Significant anxiety , Panic attacks, Obsessive or compulsive symptoms, Hallucinations, Paranoid thoughts, History consistent with sarika or hypomania, Easy distractibility, Impulsivity or recklessness, Significantly elevated mood, Racing thoughts, Increased goal-directed activity, Decreased need for sleep or Excessive talkativeness      Medical Review Of Systems:  chronic IBS pain and patellar tract disorder which is unchanged from baseline, Complete review of systems is negative except as noted above     --------------------------------------  Past Medical History:   Diagnosis Date   • Anxiety    • Chronic vaginitis 3/15/2017   • Concussion     Last Assessed: 11/15/2017   • Depression       Past Surgical History:   Procedure Laterality Date   • APPENDECTOMY     • INSERTION OF INTRAUTERINE DEVICE (IUD) N/A      Family History   Problem Relation Age of Onset   • Restless legs syndrome Mother    • Depression Mother    • Depression Father    • Colon cancer Paternal Grandmother    • Uterine cancer Paternal Grandmother    • OCD Sister    • Anxiety disorder Brother    • Hashimoto's thyroiditis Maternal Grandmother    • Breast cancer Neg Hx    • Ovarian cancer Neg Hx    • Cervical cancer Neg Hx        The following portions of the patient's history were reviewed and updated as appropriate: allergies, current medications, past family history, past medical history, past social history, past surgical history and problem list     Visit Vitals  OB Status Unknown   Smoking Status Never Smoker      Wt Readings from Last 6 Encounters:   09/27/22 59 9 kg (132 lb)   08/29/22 59 kg (130 lb)   08/23/22 58 5 kg (129 lb)   06/23/22 57 2 kg (126 lb)   05/25/22 56 7 kg (125 lb)   05/17/22 56 7 kg (125 lb)        Mental Status Exam:  Appearance:  alert, good eye contact, appears stated age, casually dressed, appropriate grooming and hygiene and smiling   Behavior:  calm, cooperative and sitting comfortably   Motor: no abnormal movements and normal gait and balance   Speech:  spontaneous and coherent   Mood:  "Anxious"   Affect:  anxious   Thought Process:  Organized, logical, goal-directed   Thought Content: no verbalized delusions or overt paranoia   Perceptual disturbances: no reported hallucinations and does not appear to be responding to internal stimuli at this time   Risk Potential: No active or passive suicidal or homicidal ideation was verbalized during interview   Cognition: oriented to person, place, time, and situation, memory grossly intact, appears to be of average intelligence, normal abstract reasoning, age-appropriate attention span and concentration and cognition not formally tested   Insight:  Good   Judgment: Good     Meds/Allergies    No Known Allergies  Current Outpatient Medications   Medication Instructions   • albuterol (PROVENTIL HFA,VENTOLIN HFA) 90 mcg/act inhaler 1-2 puffs, Inhalation   • clindamycin (CLEOCIN T) 1 % external solution Topical, 2 times daily   • dicyclomine (BENTYL) 20 mg, Oral, Every 6 hours   • Levonorgestrel (MIRENA) 20 MCG/DAY IUD 1 each, Intrauterine, Once   • venlafaxine (EFFEXOR-XR) 75 mg, Oral, Daily        Labs & Imaging:  I have personally reviewed all pertinent laboratory tests and imaging results     Lab on 08/25/2022   Component Date Value Ref Range Status   • Sodium 08/25/2022 139  135 - 147 mmol/L Final   • Potassium 08/25/2022 4 3  3 5 - 5 3 mmol/L Final   • Chloride 08/25/2022 107  96 - 108 mmol/L Final   • CO2 08/25/2022 27  21 - 32 mmol/L Final   • ANION GAP 08/25/2022 5  4 - 13 mmol/L Final   • BUN 08/25/2022 7  5 - 25 mg/dL Final   • Creatinine 08/25/2022 0 82  0 60 - 1 30 mg/dL Final Standardized to IDMS reference method   • Glucose, Fasting 08/25/2022 85  65 - 99 mg/dL Final    Specimen collection should occur prior to Sulfasalazine administration due to the potential for falsely depressed results  Specimen collection should occur prior to Sulfapyridine administration due to the potential for falsely elevated results  • Calcium 08/25/2022 9 4  8 3 - 10 1 mg/dL Final   • AST 08/25/2022 21  5 - 45 U/L Final    Specimen collection should occur prior to Sulfasalazine administration due to the potential for falsely depressed results  • ALT 08/25/2022 45  12 - 78 U/L Final    Specimen collection should occur prior to Sulfasalazine and/or Sulfapyridine administration due to the potential for falsely depressed results  • Alkaline Phosphatase 08/25/2022 98  46 - 116 U/L Final   • Total Protein 08/25/2022 7 6  6 4 - 8 4 g/dL Final   • Albumin 08/25/2022 4 3  3 5 - 5 0 g/dL Final   • Total Bilirubin 08/25/2022 0 52  0 20 - 1 00 mg/dL Final    Use of this assay is not recommended for patients undergoing treatment with eltrombopag due to the potential for falsely elevated results  • eGFR 08/25/2022 102  ml/min/1 73sq m Final   • PTT Lupus Anticoagulant 08/25/2022 46 3  0 0 - 51 9 sec Final   • Dilute Viper Venom Time 08/25/2022 43 2  0 0 - 47 0 sec Final   • DILUTE PROTHROMBIN TIME(DPT) 08/25/2022 41 6  0 0 - 47 6 sec Final   • THROMBIN TIME (DRVW) 08/25/2022 17 3  0 0 - 23 0 sec Final   • DPT CONFIRM RATIO 08/25/2022 1 04  0 00 - 1 34 Ratio Final   • LUPUS REFLEX INTERPRETATION 08/25/2022 Comment:   Final    No lupus anticoagulant was detected     Office Visit on 06/23/2022   Component Date Value Ref Range Status   • N gonorrhoeae, DNA Probe 06/23/2022 Negative  Negative Final   • Chlamydia trachomatis, DNA Probe 06/23/2022 Negative  Negative Final   Lab on 05/25/2022   Component Date Value Ref Range Status   • Anti FRED-1 05/25/2022 <0 2  0 0 - 0 9 AI Final   • Scleroderma SCL-70 05/25/2022 <0 2 0 0 - 0 9 AI Final   • Anti-Centromere B Antibodies 05/25/2022 <0 2  0 0 - 0 9 AI Final   • Histone Ab 05/25/2022 0 5  0 0 - 0 9 Units Final                             Negative                <1 0                           Weak Positive      1 0 - 1 5                           Moderate Positive  1 6 - 2 5                           Strong Positive         >2 5   • SHAAN Corbett (SM) Ab 05/25/2022 <0 2  0 0 - 0 9 AI Final   • SHAAN RNP Ab 05/25/2022 <0 2  0 0 - 0 9 AI Final   • C3 Complement 05/25/2022 147 0  90 0 - 180 0 mg/dL Final   • C4, COMPLEMENT 05/25/2022 33 0  10 0 - 40 0 mg/dL Final   • Sed Rate 05/25/2022 8  0 - 19 mm/hour Final   • C-ANCA 05/25/2022 <1:20  Neg:<1:20 titer Final   • Atypical pANCA 05/25/2022 <1:20  Neg:<1:20 titer Final    The atypical pANCA pattern has been observed in a significant  percentage of patients with ulcerative colitis, primary sclerosing  cholangitis and autoimmune hepatitis  • MPO AB 05/25/2022 <9 0  0 0 - 9 0 U/mL Final    **Effective June 27, 2022 Antimyeloperoxidase (MPO) Abs will be**    made non-orderable  Existing profiles containing this component    will be made non-orderable  Imer Pedlar will offer [id number] Anti-MPO    Antibodies beginning June 13, 2022  • VT-3 AB 05/25/2022 <3 5  0 0 - 3 5 U/mL Final    **Effective June 27, 2022 Antiproteinase 3 (VT-3) Abs will be**    made non-orderable  Existing profiles containing this component    will be made non-orderable  Imer Pedlar will offer [id number] Anti-PR3    Antibodies beginning June 13, 2022  • P-ANCA 05/25/2022 <1:20  Neg:<1:20 titer Final    The presence of positive fluorescence exhibiting P-ANCA or C-ANCA  patterns alone is not specific for the diagnosis of Wegener's  Granulomatosis (WG) or microscopic polyangiitis  Decisions about  treatment should not be based solely on ANCA IFA results  The  International ANCA Group Consensus recommends follow up testing of  positive sera with both VT-3 and MPO-ANCA enzyme immunoassays  As  many as 5% serum samples are positive only by EIA  Ref  AM J Clin Pathol 1999;111:507-513     • Total CK 05/25/2022 154  26 - 192 U/L Final   • CK-MB Index 05/25/2022 0 4  0 0 - 2 5 % Final   • CK-MB 05/25/2022 0 6  0 6 - 6 3 ng/mL Final   Office Visit on 05/25/2022   Component Date Value Ref Range Status   • Clarity, UA 05/25/2022 Clear   Final   • Color, UA 05/25/2022 Yellow   Final   • Specific Gravity, UA 05/25/2022 1 015  1 003 - 1 030 Final   • pH, UA 05/25/2022 7 0  4 5, 5 0, 5 5, 6 0, 6 5, 7 0, 7 5, 8 0 Final   • Glucose, UA 05/25/2022 Negative  Negative mg/dl Final   • Ketones, UA 05/25/2022 Negative  Negative mg/dl Final   • Occult Blood, UA 05/25/2022 Negative  Negative Final   • Protein, UA 05/25/2022 Negative  Negative mg/dl Final   • Nitrite, UA 05/25/2022 Negative  Negative Final   • Bilirubin, UA 05/25/2022 Negative  Negative Final   • Urobilinogen, UA 05/25/2022 0 2  0 2, 1 0 E U /dl E U /dl Final   • Leukocytes, UA 05/25/2022 Negative  Negative Final   • WBC, UA 05/25/2022 0-1 (A) None Seen, 2-4, 5-60 /hpf Final   • RBC, UA 05/25/2022 None Seen  None Seen, 2-4 /hpf Final   • Bacteria, UA 05/25/2022 None Seen  None Seen, Occasional /hpf Final   • Epithelial Cells 05/25/2022 Occasional  None Seen, Occasional /hpf Final   • Creatinine, Ur 05/25/2022 63 5  mg/dL Final   • Protein Urine Random 05/25/2022 5  mg/dL Final   • Prot/Creat Ratio, Ur 05/25/2022 0 08  0 00 - 0 10 Final   Lab on 05/17/2022   Component Date Value Ref Range Status   • Antinuclear Antibodies, IFA 05/17/2022 Positive (A)  Final                                         Negative   <1:80                                       Borderline  1:80                                       Positive   >1:80   • SS-A (RO) Ab 05/17/2022 <0 2  0 0 - 0 9 AI Final   • SS-B (LA) Ab 05/17/2022 <0 2  0 0 - 0 9 AI Final   • ds DNA Ab 05/17/2022 <1  0 - 9 IU/mL Final                                       Negative      <5 Equivocal  5 - 9                                     Positive      >9   • Miscellaneous Lab Test Result 05/17/2022 SEE WRITTEN REPORT   Final   • CRP 05/17/2022 6 1 (A) <3 0 mg/L Final   • Cryoglobulin 05/17/2022 Comment  None detected Final    None Detected at 72 hours  This test was developed and its performance characteristics  determined by [facility]  It has not been cleared or approved  by the Food and Drug Administration  • Cryofibrinogen 05/17/2022 Comment   Final    None Detected at 72 hours                            Normal:       None Detected  This test was developed and its performance characteristics  determined by [facility]  It has not been cleared or approved  by the Food and Drug Administration  • AntiThrombIN III Activity 05/17/2022 96  92 - 136 % of Normal Final   • ANTICARDIOLIPIN IGG ANTIBODY 05/17/2022 0 9  See comment Final   • ANTICARDIOLIPIN IGA ANTIBODY 05/17/2022 1 4  See comment Final   • ANTICARDIOLIPIN IGM ANTIBODY 05/17/2022 2 7  See comment Final   • Factor V Leiden 05/17/2022 Comment   Final    Comment: Result: c 1601G>A (p [id number]) - Not Detected  This result is not associated with an increased risk for venous  thromboembolism  See Additional Clinical Information and  Comments  Additional Clinical Information:  Venous thromboembolism is a multifactorial disease influenced by  genetic, environmental, and circumstantial risk factors  The c 1601G>A  (p  [id number]) variant in the F5 gene, commonly referred to as Factor  V Leiden, is a genetic risk factor for venous thromboembolism  Heterozygous carriers of this variant have a 6- to 8-fold increased  risk for venous thromboembolism  Individuals homozygous for this  variant (ie, with a copy of the variant on each chromosome) have an  approximately 80-fold increased risk for venous thromboembolism    Individuals who carry both a c *97G>A variant in the F2 gene and  Factor V Leiden have an approximately 20-fold increased risk for  venous thromboembolism  Risks are likely to be even higher in more  complex genotype combinations involving                            the F2 c *97G>A variant and  Factor V Leiden (PMID: [id number])  Additional risk factors include but  are not limited to: deficiency of protein C, protein S, or  antithrombin III, age, male sex, personal or family history of deep  vein thromboembolism, smoking, surgery, prolonged immobilization,  malignant neoplasm, tamoxifen treatment, raloxifene treatment, oral  contraceptive use, hormone replacement therapy, and pregnancy  Management of thrombotic risk and thrombotic events should follow  established guidelines and fit the clinical circumstance  This result  cannot predict the occurrence or recurrence of a thrombotic event  Comment:  Genetic counseling is recommended to discuss the potential clinical  implications of positive results, as well as recommendations for  testing family members  Genetic Coordinators are available for health care providers to  discuss results at [phone]  Test Details:  Variant Analyzed: c 1601G>A (p  [id number]), referred to as Factor                            V  Leiden  Methods/Limitations:  DNA analysis of the F5 gene (NM_[id number] 5) was performed by PCR  amplification followed by restriction enzyme analysis  The diagnostic  sensitivity is >99%  Results must be combined with clinical  information for the most accurate interpretation  Molecular-based  testing is highly accurate, but as in any laboratory test, diagnostic  errors may occur  False positive or false negative results may occur  for reasons that include genetic variants, blood transfusions, bone  marrow transplantation, somatic or tissue-specific mosaicism,  mislabeled samples, or erroneous representation of family  relationships    This test was developed and its performance characteristics  determined by Maicol Butts  It has not been cleared or approved by the  Food and Drug Administration  References:  Azra Berrios [facility], [facility]; [facility] Professional  Practice and Guidelines Committee  Addendum: Jeffery CARDOZO  49  consensus statement on factor                            V Leiden mutation  testing  [facility]  2021 Mar 5  doi: 10 [id number]  PMID: [id number]  Tong Ballard  Factor V Leiden Thrombophilia  1999 May 14  [Updated 2018 Jan 4]  In: Deepthi Vela, Desmond HH, Roxie RA, et al ,  editors  Alexis(R) [Internet]  Values of nAbsolute AntibodyTeresa [place] [address] ([facility]): [facility], [place] [address]; [phone]  Available from:  Fiorella Birch, Robinson Solomon;  [facility] Laboratory  Committee  Venous thromboembolism  laboratory testing (factor V Leiden and factor II c *97G>A), 2018  update: a technical standard of the [address] ([facility])  [facility]  2018 Dec;20(12):5001-1894   doi: 99 [id number]  Epub 2018 Oct 5  PMID: [id number]  Fili Leroy, PhD, Dean Moreno, PhD, Mary Nolen, PhD, Angelica Flores, PhD, Joaquina Ordoñez, PhD, Aric Medeiros, PhD, Orquidea Cooper, PhD, [facility], [facility]    • PTT Lupus Anticoagulant 05/17/2022 48 4  0 0 - 51 9 sec Final   • Dilute Viper Venom Time 05/17/2022 47 3 (A) 0 0 - 47 0 sec Final   • DILUTE PROTHROMBIN TIME(DPT) 05/17/2022 33 8  0 0 - 47 6 sec Final   • THROMBIN TIME (DRVW) 05/17/2022 16 3  0 0 - 23 0 sec Final   • DPT CONFIRM RATIO 05/17/2022 1 29  0 00 - 1 34 Ratio Final   • LUPUS REFLEX INTERPRETATION 05/17/2022 Comment:   Final    Results are consistent with the presence of a lupus anticoagulant  As only  persistent lupus anticoagulant (LA) positivity meets laboratory diagnostic  criteria for antiphospholipid syndrome, repeat testing in 12 or more weeks  is recommended, ideally in the absence of anticoagulant therapy      Important Note: The results of LA testing are not valid for patients  receiving heparin, direct Xa inhibitor (e g , rivaroxaban, apixaban) or  direct thrombin inhibitor (e g , dabigatran) therapy  These drugs may cause  false positive LA results but will not interfere with anticardiolipin and  beta-2 glycoprotein 1 antibody testing  • Protein C Activity 05/17/2022 >150 (A) 60 - 150 % of Normal Final   • PROTEIN S ACTIVITY 05/17/2022 103  68 - 108 % Final   • Protein S Ag, Total 05/17/2022 70  60 - 150 % Final    This test was developed and its performance characteristics  determined by Ge Brown  It has not been cleared or approved  by the Food and Drug Administration  • Protein S Ag, Free 05/17/2022 80  61 - 136 % Final   • Prothrombin Mutation 05/17/2022 Comment   Final    Comment: Result: c *97G>A - Not Detected  This result is not associated with an increased risk for venous  thromboembolism  See Additional Clinical Information and  Comments  Additional Clinical Information:  Venous thromboembolism is a multifactorial disease influenced by  genetic, environmental, and circumstantial risk factors  The c *97G>A  variant in the F2 gene is a genetic risk factor for venous  thromboembolism  Heterozygous carriers have a 2- to 4-fold increased  risk for venous thromboembolism  Homozygotes for the c *97G>A variant  are rare  The annual risk of VTE in homozygotes has been reported to  be 1 1%/year  Individuals who carry both a c *97G>A variant in the  F2 gene and a c 1601G>A (p  [id number]) variant in the F5 gene  (commonly referred to as Factor V Leiden) have an approximately 20-  fold increased risk for venous thromboembolism  Risks are likely to  be even higher in more complex genotype combinations involving the  F2 c *97G>A variant and Factor V Leiden (PMID:                            [id number])   Additional  risk factors include but are not limited to: deficiency of protein C,  protein S, or antithrombin III, age, male sex, personal or family  history of deep vein thromboembolism, smoking, surgery, prolonged  immobilization, malignant neoplasm, tamoxifen treatment, raloxifene  treatment, oral contraceptive use, hormone replacement therapy, and  pregnancy  Management of thrombotic risk and thrombotic events should  follow established guidelines and fit the clinical circumstance  This  result cannot predict the occurrence or recurrence of a thrombotic  event  Comments:  Genetic counseling is recommended to discuss the potential clinical  implications of positive results, as well as recommendations for  testing family members  Genetic Coordinators are available for health care providers to discuss  results at [phone]  Test Details:  Variant analyzed: c *97G>A, previously referred to as [id number]  Methods/Limitations:  DNA analysis of the F2 gene                            ([id number]  5) was performed by PCR  amplification followed by restriction enzyme analysis  The diagnostic  sensitivity is >99%  Results must be combined with clinical  information for the most accurate interpretation  Molecular-based  testing is highly accurate, but as in any laboratory test, diagnostic  errors may occur  False positive or false negative results may occur  for reasons that include genetic variants, blood transfusions, bone  marrow transplantation, somatic or tissue-specific mosaicism,  mislabeled samples, or erroneous representation of family  relationships  This test was developed and its performance characteristics determined  by Alexandria Morales  It has not been cleared or approved by the Food and Drug  Administration  References:  Antelmo Gu [facility], [facility]; [facility] Professional  Practice and Guidelines Committee  Addendum: Jeffery Storey U  49  consensus statement on factor V Leiden mutation  testing  [facility]  2021 Mar 5  doi:                            10 [id number]  PMID: [id number]  Isac Bosworth  Prothrombin Thrombophilia  2006 Jul 25  [Updated 2021 Feb 4]  In: Linnette Cochran, Desmond HH, Roxie RA, et al ,  editors  Alexis(MARYANNE) [Internet]  [place]ayan [place] ([facility]): [facility], [place]; [phone]  Available from:  Alphonso Devlin, Julia Cortez, Abi SEPULVEDA;  [facility] Laboratory  Committee  Venous thromboembolism  laboratory testing (factor V Leiden and factor II c *97G>A),  2018 update: a technical standard of the [address] ([facility])  [facility]  2018 Dec;20(12):9945-7715   doi: [id number]  Epub 2018 Oct 5  PMID: [id number]  Fina Rodriguez, PhD, Mark Mayfield, PhD, Sherrie Nunez, PhD, Iva Sinha, PhD, Clora Schaumann, PhD, Jenny Pearson, PhD, Aimee Staples, PhD, [facility], [facility]    • BETA 2 [facility] 1 IGG 05/17/2022 <0 6  See comment Final   • BETA 2 GLYCO 1 IGA 05/17/2022 1 0  See comment Final   • BETA 2 GLYCO 1 IGM 05/17/2022 <2 9  See comment Final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

## 2022-10-26 ENCOUNTER — TELEPHONE (OUTPATIENT)
Dept: PSYCHIATRY | Facility: CLINIC | Age: 22
End: 2022-10-26

## 2022-10-26 ENCOUNTER — OFFICE VISIT (OUTPATIENT)
Dept: PSYCHIATRY | Facility: CLINIC | Age: 22
End: 2022-10-26

## 2022-10-26 VITALS — BODY MASS INDEX: 23.49 KG/M2 | WEIGHT: 132.6 LBS

## 2022-10-26 DIAGNOSIS — F41.1 GENERALIZED ANXIETY DISORDER: ICD-10-CM

## 2022-10-26 DIAGNOSIS — F32.1 CURRENT MODERATE EPISODE OF MAJOR DEPRESSIVE DISORDER WITHOUT PRIOR EPISODE (HCC): Primary | ICD-10-CM

## 2022-10-26 RX ORDER — HYDROXYZINE HYDROCHLORIDE 25 MG/1
25 TABLET, FILM COATED ORAL EVERY 8 HOURS PRN
COMMUNITY

## 2022-10-26 NOTE — BH TREATMENT PLAN
TREATMENT PLAN        [facility]    Name and Date of Birth:  Per Masters 25 y o  2000  Date of Treatment Plan: October 26, 2022  Diagnosis/Diagnoses:    1  Current moderate episode of major depressive disorder without prior episode ([address] )    2  Generalized anxiety disorder        Strengths/Personal Resources for Self-Care: supportive family, supportive friends, taking medications as prescribed, ability to communicate well, financial security, good understanding of illness, motivation for treatment, being resoureceful, self-reliance, sense of humor, special hobby/interest, stable employment, well educated, willingness to work on problems    Area/Areas of need: anxiety symptoms, depressive symptoms    Long Term Goal: continue improvement in acceptable anxiety level  Target Date: 6 months - April 26, 2023  Person/Persons responsible for completion of goal: Gilbert Mayen     Short Term Objective (s) - How will we reach this goal?:   1  Take medications as prescribed  2  Attend psychiatry appointments regularly  3  Follow up with medical providers  4  Continue psychotherapy regularly  5  Practice coping skills  6  Try sleep hygiene techniques  7  Avoid alcohol   8  Avoid drugs   9  Eat a healthy diet   10  Exercise daily   11  Take walks regularly  12  Spend more time with friends and family  15  Try breathing exercises  14  Try relaxation techniques  Target Date: 3 months - January 26, 2023  Person/Persons Responsible for Completion of Goal: Brenda     Progress Towards Goals: Continuing treatment    Treatment Modality: medication management every 3 months or sooner if needed, continue psychotherapy and follow up with PCP  Review due 180 days from date of this plan: April 24, 2023   Expected length of service: Ongoing treatment    My physician and I have developed this plan together, and I agree to work on the goals and objectives   I understand the treatment goals that were developed for my treatment  The treatment plan was created between Cody Jose DO and Monique Lozoya on 10/26/22 at 3:07 PM but not signed at the time of the visit due to [facility]  The plan was reviewed, and verbal consent was given

## 2022-10-26 NOTE — PATIENT INSTRUCTIONS
Look up "grounding techniques" and/or "anchoring demonstration" online and try a few to see what may work for you  Practice these skills before you need them, when you are not feeling too anxious or triggered  You can also search for free guided meditation videos online to help improve your head space when you are feeling very anxious or triggered  Healthy Diet   The American Heart Association and the [employer] of Cardiology have long recommended a healthy diet for not only patients who are at risk for atherosclerotic cardiovascular disease (ASCVD) but also the general public  In keeping with this evidence-based recommendation, the "2018 Guideline on the Management of Blood Cholesterol" stresses that a healthy diet should include adequate intake of these essentials:   Vegetables, fruits, and whole grains   Legumes and nuts   Low-fat dairy products   Low-fat poultry (without the skin)   Fish and seafood   Nontropical vegetable oils     The recent guidelines do provide room for cultural food preferences in a healthy diet, but in general, all patients should limit their intake of saturated and trans fats, sweets, sugar-sweetened beverages, and red meats  Physical Activity   In addition to a healthy diet, all patients should include regular physical activity in their weekly routines, at moderate to vigorous intensity  Any activity is better than nothing, so if your patients can't meet the recommendation of vigorous activity, moderate-intensity activity can still help them reduce their risk of ASCVD  Below are the American Heart Association's recommendations for physical activity per week (preferably spread throughout the week):      For Overall Cardiovascular Health and Lowering Cholesterol   At least 150 minutes of moderate-intensity physical activity (for example, 30 minutes, 5 days a week), or   At least 75 minutes of vigorous-intensity physical activity (for example, 25 minutes, 3 days a week); or A combination of moderate- and vigorous-intensity aerobic activity, and   At least 2 days of moderate- to high-intensity muscle-strengthening activities (such as resistance weight training) for additional health benefits    Weight Control   It's important to work with patients to help them reach and maintain a healthy weight (Table 3)  You may need to suggest that they adjust their caloric intake to avoid weight gain or, in overweight and obese patients, to promote weight loss  Table 3  Body Mass Index            Recommendations regarding insomnia:  Wake-up at the same time every day  Refrain from "napping"  Refrain from going to bed unless you're tired  Utilize your bedroom for sleep only  Avoid use of electronics including television and/or cellphone/computers  Refrain from use of electronics including television and/or cellphones/computers prior to bed  Turn your alarm clock away so the light is not visible  Attempt relaxation using various means like reading if you're restless in bed for approximately 15-20 minutes  Participate in regular physical activities like exercise, although avoid approximately 3-4 hours prior to bed  Morning exercise is ideal   Avoid caffeine use prior to bedtime  Consider tapering down excessive use of caffeine  Avoid tobacco use prior to bedtime  Avoid alcohol use prior to bedtime  Consider reading "No More Sleepless nights" by Laina Diez, Ph D   Consider use of online resources including:  http://[employer]cbt-online-insomnia-treatment html  [employer]  com  CBT-I   Go! To Sleep by the [facility]  Please call the office nursing staff for medication issues including refills, problems getting medications, bothersome side effects, etc at [phone]  Please return for a follow up appointment as discussed and arrive approximately 15 minutes prior to your appointment time   If you are running late or are unable to attend your appointment, please call our [place] office at [phone]  If you have thoughts of harming yourself or are otherwise in psychological crisis, do not hesitate to contact your [address], or 911 or go to the nearest emergency room    [facility] Crisis: [address] Crisis: [phone]  [address] Crisis: [address] Crisis: [address] Crisis: [address] Crisis: [address] [facility] Crisis: [phone]  National Suicide Prevention Hotline: 6-551.781.5631 or call 65

## 2022-11-11 ENCOUNTER — TELEMEDICINE (OUTPATIENT)
Dept: BEHAVIORAL/MENTAL HEALTH CLINIC | Facility: CLINIC | Age: 22
End: 2022-11-11

## 2022-11-11 DIAGNOSIS — F32.1 CURRENT MODERATE EPISODE OF MAJOR DEPRESSIVE DISORDER WITHOUT PRIOR EPISODE (HCC): Primary | ICD-10-CM

## 2022-11-11 DIAGNOSIS — F41.1 GENERALIZED ANXIETY DISORDER: ICD-10-CM

## 2022-11-11 NOTE — PSYCH
Psychotherapy Provided: individual psychotherapy 45 mins     Length of time in session: 38 minutes, follow up in 2 week    Encounter Diagnosis     ICD-10-CM    1  Current moderate episode of major depressive disorder without prior episode ([facility] [address] )  F32 1    2  Generalized anxiety disorder  F41 1        Goals addressed in session: 1,2    Pain:      none    0    Current suicide risk : Low     D:  Met with Brenda for session  She does not feel well  Her boyfriend has Covid  She thought she did but does not  This brought back memories from her childhood when her parents told her that "she is dramatic, she always makes it about her, etc "   Discussed she can still have a virus just not that virus  She called off of work today which make things tighter  If she does not work, she does not get paid  Her boyfriend is going to be changing his shift at work at least until Jan and maybe permanently due to a promotion  "She is happy for him and for the money but, she does not do good alone "  They will be working different shifts and will not see much of each other  Discussed his working a different shift does not mean she is alone  Discussed copping skills  A:  Mod progress on goals  P:  To continue addressing  tx plan goals  Behavioral Health Treatment Plan [facility]: Diagnosis and Treatment Plan explained to Memory Robel relates understanding diagnosis and is agreeable to Treatment Plan  Yes     Virtual Regular Visit    Verification of patient location:    Patient is located in the following state in which I hold an active license [place]      Assessment/Plan:    Problem List Items Addressed This Visit    None         Goals addressed in session: -         Reason for visit is   No chief complaint on file         Encounter provider Charisse Tapia    Provider located at [address] [place] [address]  [address] [place] [address]  [phone]      Recent Visits  No visits were found meeting these conditions  Showing recent visits within past 7 days and meeting all other requirements  Today's Visits  Date Type Provider Dept   11/11/22 [address] [facility] [address] hartman's visits and meeting all other requirements  Future Appointments  No visits were found meeting these conditions  Showing future appointments within next 150 days and meeting all other requirements       The patient was identified by name and date of birth  [place] was informed that this is a telemedicine visit and that the visit is being conducted through[employer]poles and patient was informed that this is a secure, HIPAA-compliant platform  She agrees to proceed     My office door was closed  No one else was in the room  She acknowledged consent and understanding of privacy and security of the video platform  The patient has agreed to participate and understands they can discontinue the visit at any time  Patient is aware this is a billable service  Subjective  Brenda Navarro is a 25 y o  female          HPI     Past Medical History:   Diagnosis Date   • Anxiety    • Chronic vaginitis 3/15/2017   • Concussion     Last Assessed: 11/15/2017   • Depression        Past Surgical History:   Procedure Laterality Date   • APPENDECTOMY     • INSERTION OF INTRAUTERINE DEVICE (IUD) N/A        Current Outpatient Medications   Medication Sig Dispense Refill   • albuterol (PROVENTIL HFA,VENTOLIN HFA) 90 mcg/act inhaler Inhale 1-2 puffs  0   • clindamycin (CLEOCIN T) 1 % external solution Apply topically 2 (two) times a day 30 mL 0   • dicyclomine (BENTYL) 20 mg tablet Take 1 tablet (20 mg total) by mouth every 6 (six) hours 60 tablet 0   • hydrOXYzine HCL (ATARAX) 25 mg tablet Take 25 mg by mouth every 8 (eight) hours as needed     • Levonorgestrel (MIRENA) 20 MCG/DAY IUD 1 each by Intrauterine route once     • venlafaxine (EFFEXOR-XR) 75 mg 24 hr capsule Take 1 capsule (75 mg total) by mouth daily 90 capsule 1     No current facility-administered medications for this visit  No Known Allergies    Review of Systems    Video Exam    There were no vitals filed for this visit  Physical Exam     I spent 50 minutes directly with the patient during this visit    [address] verbally agrees to participate in [employer]  Pt is aware that [employer] could be limited without vital signs or the ability to perform a full hands-on physical exam  Brenda Jackson understands she or the provider may request at any time to terminate the video visit and request the patient to seek care or treatment in person

## 2022-12-01 ENCOUNTER — TELEMEDICINE (OUTPATIENT)
Dept: BEHAVIORAL/MENTAL HEALTH CLINIC | Facility: CLINIC | Age: 22
End: 2022-12-01

## 2022-12-01 DIAGNOSIS — F32.1 CURRENT MODERATE EPISODE OF MAJOR DEPRESSIVE DISORDER WITHOUT PRIOR EPISODE (HCC): Primary | ICD-10-CM

## 2022-12-01 DIAGNOSIS — F41.1 GENERALIZED ANXIETY DISORDER: ICD-10-CM

## 2022-12-01 NOTE — PSYCH
Psychotherapy Provided: individual psychotherapy 45 mins     Length of time in session: 38 minutes, follow up in 2 week    Encounter Diagnosis     ICD-10-CM    1  Current moderate episode of major depressive disorder without prior episode ([facility] [address] )  F32 1       2  Generalized anxiety disorder  F41 1           Goals addressed in session: 1,2    Pain:      none    0    Current suicide risk : Low     D:  Met with Brenda for session  After being sick last session with a virus she then got COVID  Due to being sick she could not work and is now short on money  She has to borrow money form her parents but, always pays them back but, feels guilty  She talked to her boyfriend's dad about the incident that happened between her boyfriend and her  "It didn't go as she hoped but, it is over and she will now go to family functions with her boyfriend "     A:  Mod progress on goals  P:  To continue addressing  tx plan goals  Behavioral Health Treatment Plan [facility]: Diagnosis and Treatment Plan explained to Eleanor Avina relates understanding diagnosis and is agreeable to Treatment Plan  Yes     Virtual Regular Visit    Verification of patient location:    Patient is located in the following [facility] in which I hold an active license [place]      Assessment/Plan:    Problem List Items Addressed This Visit    None      Goals addressed in session: -         Reason for visit is   No chief complaint on file  Encounter provider Horace Eng    Provider located at [address] [place] [address] [id number] [phone]      Recent Visits  No visits were found meeting these conditions    Showing recent visits within past 7 days and meeting all other requirements  Today's Visits  Date Type Provider Dept   12/01/22 [address] [place] [address]  today's visits and meeting all other requirements  Future Appointments  No visits were found meeting these conditions  Showing future appointments within next 150 days and meeting all other requirements       The patient was identified by name and date of birth  Dennise Fareed was informed that this is a telemedicine visit and that the visit is being conducted through[employer] [facility] and patient was informed that this is a secure, HIPAA-compliant platform  She agrees to proceed     My office door was closed  No one else was in the room  She acknowledged consent and understanding of privacy and security of the video platform  The patient has agreed to participate and understands they can discontinue the visit at any time  Patient is aware this is a billable service  Subjective  Brenda Marroquin is a 25 y o  female    HPI     Past Medical History:   Diagnosis Date   • Anxiety    • Chronic vaginitis 3/15/2017   • Concussion     Last Assessed: 11/15/2017   • Depression        Past Surgical History:   Procedure Laterality Date   • APPENDECTOMY     • INSERTION OF INTRAUTERINE DEVICE (IUD) N/A        Current Outpatient Medications   Medication Sig Dispense Refill   • albuterol (PROVENTIL HFA,VENTOLIN HFA) 90 mcg/act inhaler Inhale 1-2 puffs  0   • clindamycin (CLEOCIN T) 1 % external solution Apply topically 2 (two) times a day 30 mL 0   • dicyclomine (BENTYL) 20 mg tablet Take 1 tablet (20 mg total) by mouth every 6 (six) hours 60 tablet 0   • hydrOXYzine HCL (ATARAX) 25 mg tablet Take 25 mg by mouth every 8 (eight) hours as needed     • Levonorgestrel (MIRENA) 20 MCG/DAY IUD 1 each by Intrauterine route once     • venlafaxine (EFFEXOR-XR) 75 mg 24 hr capsule Take 1 capsule (75 mg total) by mouth daily 90 capsule 1     No current facility-administered medications for this visit  No Known Allergies    Review of Systems    Video Exam    There were no vitals filed for this visit      Physical Exam     I spent 50 minutes directly with the patient during this visit    VIRTUAL VISIT DISCLAIMER    Brenda Bernard verbally agrees to participate in [employer]  Pt is aware that [employer] could be limited without vital signs or the ability to perform a full hands-on physical exam  Brenda Almaguer understands she or the provider may request at any time to terminate the video visit and request the patient to seek care or treatment in person

## 2022-12-09 NOTE — PSYCH
Psychotherapy Provided: Individual Psychotherapy 50 minutes     Length of time in session: 50 minutes, follow up in 2 week    Encounter Diagnosis     ICD-10-CM    1  Current moderate episode of major depressive disorder without prior episode ([facility] [address] )  F32 1       2  Generalized anxiety disorder  F41 1           Goals addressed in session: 1,2    Pain:      none    0    Current suicide risk : Low     D:  Met with Brenda for session  She started school  She thinks her classes are going to be ok  She is going to balance going to school full time and all of her part time jobs      A:  Mod progress on goals  P:  To continue addressing  tx plan goals  Behavioral Health Treatment Plan [facility]: Diagnosis and Treatment Plan explained to Juany Cabral relates understanding diagnosis and is agreeable to Treatment Plan  Yes     Virtual Regular Visit    Verification of patient location:    Patient is located in the following state in which I hold an active license [place]      Assessment/Plan:    Problem List Items Addressed This Visit    None      Goals addressed in session: -         Reason for visit is   No chief complaint on file  Encounter provider Golden Rosado    Provider located at [address] [place] [id number] [phone]      Recent Visits  No visits were found meeting these conditions  Showing recent visits within past 7 days and meeting all other requirements  Future Appointments  No visits were found meeting these conditions  Showing future appointments within next 150 days and meeting all other requirements       The patient was identified by name and date of birth  Madai Espinoza was informed that this is a telemedicine visit and that the visit is being conducted through telephone and patient was informed that this is a secure, HIPAA-compliant platform  She agrees to proceed     My office door was closed  No one else was in the room  She acknowledged consent and understanding of privacy and security of the video platform  The patient has agreed to participate and understands they can discontinue the visit at any time  Patient is aware this is a billable service  Subjective  Brenda Ferrera is a 25 y o  female    HPI     Past Medical History:   Diagnosis Date   • Anxiety    • Chronic vaginitis 3/15/2017   • Concussion     Last Assessed: 11/15/2017   • Depression        Past Surgical History:   Procedure Laterality Date   • APPENDECTOMY     • INSERTION OF INTRAUTERINE DEVICE (IUD) N/A        Current Outpatient Medications   Medication Sig Dispense Refill   • albuterol (PROVENTIL HFA,VENTOLIN HFA) 90 mcg/act inhaler Inhale 1-2 puffs  0   • clindamycin (CLEOCIN T) 1 % external solution Apply topically 2 (two) times a day 30 mL 0   • dicyclomine (BENTYL) 20 mg tablet Take 1 tablet (20 mg total) by mouth every 6 (six) hours 60 tablet 0   • hydrOXYzine HCL (ATARAX) 25 mg tablet Take 25 mg by mouth every 8 (eight) hours as needed     • Levonorgestrel (MIRENA) 20 MCG/DAY IUD 1 each by Intrauterine route once     • venlafaxine (EFFEXOR-XR) 75 mg 24 hr capsule Take 1 capsule (75 mg total) by mouth daily 90 capsule 1     No current facility-administered medications for this visit  No Known Allergies    Review of Systems    Video Exam    There were no vitals filed for this visit  Physical Exam     I spent 50 minutes directly with the patient during this visit    [address] verbally agrees to participate in [employer]  Pt is aware that [employer] could be limited without vital signs or the ability to perform a full hands-on physical exam  Brenda Belle understands she or the provider may request at any time to terminate the video visit and request the patient to seek care or treatment in person

## 2022-12-14 ENCOUNTER — TELEMEDICINE (OUTPATIENT)
Dept: DERMATOLOGY | Age: 22
End: 2022-12-14

## 2022-12-14 DIAGNOSIS — L70.0 ACNE VULGARIS: Primary | ICD-10-CM

## 2022-12-14 RX ORDER — SPIRONOLACTONE 25 MG/1
25 TABLET ORAL DAILY
Qty: 30 TABLET | Refills: 1 | Status: SHIPPED | OUTPATIENT
Start: 2022-12-14 | End: 2023-01-13

## 2022-12-14 RX ORDER — DOXYCYCLINE 100 MG/1
100 TABLET ORAL 2 TIMES DAILY
Qty: 60 TABLET | Refills: 1 | Status: SHIPPED | OUTPATIENT
Start: 2022-12-14 | End: 2023-01-13

## 2022-12-14 RX ORDER — ADAPALENE 0.1 G/100G
CREAM TOPICAL
Qty: 45 G | Refills: 3 | Status: SHIPPED | OUTPATIENT
Start: 2022-12-14 | End: 2023-01-13

## 2022-12-14 NOTE — PROGRESS NOTES
Virtual Regular Visit    Verification of patient location:    Patient is located in the following state in which I hold an active license [place]      Assessment/Plan:    Problem List Items Addressed This Visit    None           Reason for visit is   Chief Complaint   Patient presents with   • Virtual Regular Visit        Encounter provider Jeannette Chavarria MD    Provider located at [place] [id number] [phone]      Recent Visits  No visits were found meeting these conditions  Showing recent visits within past 7 days and meeting all other requirements  Future Appointments  No visits were found meeting these conditions  Showing future appointments within next 150 days and meeting all other requirements       The patient was identified by name and date of birth  Cheyenne Vickers was informed that this is a telemedicine visit and that the visit is being conducted through the [facility]  She agrees to proceed     My office door was closed  The patient was notified the following individuals were present in the room Jaswant CRESPO  She acknowledged consent and understanding of privacy and security of the video platform  The patient has agreed to participate and understands they can discontinue the visit at any time  Patient is aware this is a billable service  Subjective  Brenda Valera is a 25 y o  female being seen for acne          HPI     Past Medical History:   Diagnosis Date   • Anxiety    • Chronic vaginitis 3/15/2017   • Concussion     Last Assessed: 11/15/2017   • Depression        Past Surgical History:   Procedure Laterality Date   • APPENDECTOMY     • INSERTION OF INTRAUTERINE DEVICE (IUD) N/A        Current Outpatient Medications   Medication Sig Dispense Refill   • albuterol (PROVENTIL HFA,VENTOLIN HFA) 90 mcg/act inhaler Inhale 1-2 puffs  0   • clindamycin (CLEOCIN T) 1 % external solution Apply topically 2 (two) times a day 30 mL 0   • dicyclomine (BENTYL) 20 mg tablet Take 1 tablet (20 mg total) by mouth every 6 (six) hours 60 tablet 0   • hydrOXYzine HCL (ATARAX) 25 mg tablet Take 25 mg by mouth every 8 (eight) hours as needed     • Levonorgestrel (MIRENA) 20 MCG/DAY IUD 1 each by Intrauterine route once     • venlafaxine (EFFEXOR-XR) 75 mg 24 hr capsule Take 1 capsule (75 mg total) by mouth daily 90 capsule 1     No current facility-administered medications for this visit  No Known Allergies    Review of Systems    Video Exam    There were no vitals filed for this visit  Physical Exam     I spent 10 minutes directly with the patient during this visit    [place] [address] [facility] Note     Patient Name: Mac Palma  Encounter Date: 12/14/2022     Have you been cared for by a [place] [address] Dermatologist in the last 3 years and, if so, which description applies to you? Yes  I have been here within the last 3 years, and my medical history has NOT changed since that time  I am FEMALE/of child-bearing potential     REVIEW OF SYSTEMS:  Have you recently had or currently have any of the following? · No changes in my recent health  PAST MEDICAL HISTORY:  Have you personally ever had or currently have any of the following? If "YES," then please provide more detail  · No changes in my medical history  FAMILY HISTORY:  Any "first degree relatives" (parent, brother, sister, or child) with the following? • No changes in my family's known health  PATIENT EXPERIENCE:    • Do you want the Dermatologist to perform a COMPLETE skin exam today including a clinical examination under the "bra and underwear" areas? NO  • If necessary, do we have your permission to call and leave a detailed message on your Preferred Phone number that includes your specific medical information?   Yes      No Known Allergies   Current Outpatient Medications:   •  albuterol (PROVENTIL HFA,VENTOLIN HFA) 90 mcg/act inhaler, Inhale 1-2 puffs, Disp: , Rfl: 0  •  clindamycin (CLEOCIN T) 1 % external solution, Apply topically 2 (two) times a day, Disp: 30 mL, Rfl: 0  •  dicyclomine (BENTYL) 20 mg tablet, Take 1 tablet (20 mg total) by mouth every 6 (six) hours, Disp: 60 tablet, Rfl: 0  •  hydrOXYzine HCL (ATARAX) 25 mg tablet, Take 25 mg by mouth every 8 (eight) hours as needed, Disp: , Rfl:   •  Levonorgestrel (MIRENA) 20 MCG/DAY IUD, 1 each by Intrauterine route once, Disp: , Rfl:   •  venlafaxine (EFFEXOR-XR) 75 mg 24 hr capsule, Take 1 capsule (75 mg total) by mouth daily, Disp: 90 capsule, Rfl: 1          • Whom besides the patient is providing clinical information about today's encounter?   o NO ADDITIONAL HISTORIAN (patient alone provided history)    Physical Exam and Assessment/Plan by Diagnosis:    ACNE VULGARIS ("COMMON ACNE")    Physical Exam:  • Anatomic Location Affected: Face  • Morphological Description:  o Open/Closed Comedones:  - Several ("Moderate")  o Inflammatory Papules/Pustules:  - Several ("Moderate")  o Nodules:  - Several ("Moderate")  o Scarring:  - Few ("Mild")  o Excoriations:  - Rare ("Almost Clear")  o Local Skin Redness/Erythema:  - Several ("Moderate")  o Local Skin Dryness/Scaling:  - Rare ("Almost Clear")  o Local Skin Dyspigmentation:  - Rare ("Almost Clear")      Additional History of Present Condition:  Present for at least 6 months denies any symptons   Obgyn prescribed clindamycin solution which seems to help a little  Mirena placed in June 2022  Currently not getting menstrual cycle     Treatment plan:Based on a thorough discussion of this condition and the management approach to it (including a comprehensive discussion of the known risks, side effects and potential benefits of treatment), the patient (family) agrees to implement the following specific plan:     Topical medications:                Night: Start Adapalene 0 1% cream pea size amount to entire face every other night to start     Oral medications:        Morning and Night : Start doxycycline 100 mg twice a day by mouth for 2 months         Morning or Night: Start Spironolactone 25 mg by mouth once a day for 2 months     · If too drying applying moisturizer prior to application   · If still too drying contact office via my chart for an alternative medication  · Spironolactone risks and benefits discussed  · Reassured it can cause low blood pressure, breast tenderness   · Advised to drink a lot of water   · Follow up in 2 months         REMEMBER:  Always take your acne pills with lots of water! A pill stuck in your throat can cause significant burning and irritation  Drink a full glass of water to ensure the pill gets into your stomach  Avoid “popping” a pill right before bed, and stay upright for at least 1 hour after taking a pill  ACNE:  WHAT ZIT ALL ABOUT? WHY DO I HAVE ACNE/PIMPLES? Your skin is made of layers  To keep the skin from becoming dry and cracked, the skin needs oil  The oil is made in little wells in the deeper layers in the skin  People with acne have glands that make more oil and are more easily plugged, causing the glands to swell  Hormones, bacteria and your inherited tendency to have acne all play a role  The medical term for “pimples” is acne or acne vulgaris (vulgaris means “common”)  Most people get some acne  Acne does not come from being dirty  Instead, it is an expected consequence of changes that occur during normal growth and development  Hormones, bacteria, and your family's tendency to have acne may all play a role  “Whiteheads” or “blackheads” are openings of the glands (glands are the oil factories) onto the surface of the skin  “Blackheads” are not caused by dirt blocking the pores; instead, they result from the oxidation reaction of oil and skin in the pores with the air (like a “rust” reaction)  WHAT ABOUT STRESS? Stress does not “cause” acne but it can make it worse   Make sure you get enough sleep and daily exercise! WHAT ABOUT FOODS/DIET? Try to eat a balanced, healthy diet  Some people feel that certain foods worsen their acne  While there aren't many studies available on this question, severe dietary changes are unlikely to help your acne and may be harmful to the health of your skin  If you find that a certain food seems to aggravate your acne, you may consider avoiding that food  Discuss this with your physician! WHAT CAUSES MY ACNE? There are four contributors to acne--the body's natural oil (sebum), clogged pores, bacteria (with the scientific name Propionibacterium acnes, or P  acnes, for short), and the body's reaction to the bacteria living in the clogged pores (which causes inflammation)  Here's what happens:    • Sebum is produced in the normal oil-making glands in the deeper layers of the skin and reaches the surface through the skin's pores  An increase in certain hormones occurs around the time of puberty, and these hormones trigger the oil glands to produce increased amounts of sebum  • Pores with excess oil tend to become clogged more easily  • At the same time, P  acnes--one of the many types of bacteria that normally live on everyone's skin--thrives in the excess oil and causes a skin reaction (inflammation)  • If a pore is clogged close to the surface, there is little inflammation  However, this results in the formation of “whiteheads” (closed comedones) or “blackheads” (open comedones) at the surface of the skin  • A plug that extends to, or forms a little deeper in the pore, or one that enlarges or ruptures may cause more inflammation  The result is red bumps (papules) and pus-filled pimples (pustules)  • If plugging happens in the deepest skin layer, the inflammation may be even more severe, resulting in the formation of nodules or cysts  When these types of acne heal, they may leave behind discolored areas or true scars      SKIN HYGIENE:  HOW SHOULD I [address] MY SKIN? Acne does not come from being dirty, however, washing your face is part of taking good care of your skin and will help keep your face clear  Good skin hygiene is, therefore, critical to support any acne treatment plan  Here are several specific suggestions for practicing good skin hygiene and keeping your skin looking its best:    • You should wash acne-prone skin TWICE A DAY: Once in the morning and once in the evening  This does include any showers you take that day, so do not overdo it! • Do not scrub the skin with a washcloth or loofah as these can irritate and inflame your acne  Acne does not come from “dirt”, so it is not necessary to scrub the skin clean  In fact, scrubbing may lead to dryness and irritation that makes the acne even worse and harder for patients to tolerate acne medications  • Use a gentle facial moisturizing cleanser (Cetaphil Moisturizing Cleanser or Dove Fragrance-Free bar)  Avoid using soaps like Shayla Damian, Daphnie Conrad 39, [facility], or soft/liquid soaps as these products will dry your skin  • Do not use any over-the-counter “acne washes” without your doctor's specific instruction to do so  These products often contain salicylic acid or benzoyl peroxide  These ingredients can be helpful in clearing oil from the skin and reducing bacteria, but they may also be drying and can add to irritation  • Do not use exfoliating products with microbeads or brushes as these can cause irritation to the skin which can worsen the acne  • Facials and other treatments to remove, squeeze, or “clean out” pores, if done by a , can help the acne  They should not be done more than every 8 weeks  • Try not to “pop pimples” or pick at your acne as this can delay healing and may result in scarring or skin color changes (“dark spots”) that are often more noticeable than the acne itself  Picking/popping acne can also cause a serious skin infection    • Wash or change your pillow case once to twice a week, especially if you use products in your hair  • Wash the skin as soon as possible after playing sports or other activities that cause a lot of sweating  Also, pay attention to how your sports equipment (shoulder pads, helmet strap, etc ) might be making your acne worse  • When you use makeup, moisturizer, or sunscreen make sure that these products are labeled “non-comedogenic,” or “won't clog pores,” or “won't cause acne ”           WHAT ACNE TREATMENTS ARE AVAILABLE? Medications for acne try to stop the formation of new pimples by reducing or removing the oil, bacteria, and other things (like dead skin cells) that clog the pores  They can also decrease the inflammation or irritation response of the skin to bacteria  It may take from 6 to 8 weeks (about 2 months!) before you see any improvement and know if the medication is effective  It takes the layers of skin this long to regenerate  Remember, these medications do not “cure” the condition--the acne improves because of the medication  Therefore, treatment must be continued in order to prevent the return of acne lesions  There are many types of acne treatments  Some are applied to the skin (“topical” medications) and some are taken by mouth (“oral” medications)  In most cases of mild acne, the doctor will start with a topical medication  There are many different topical medications that are helpful for acne  If acne is more severe and it does not respond adequately to a topical medication, or if it covers large body surface areas such as the back and/or chest, oral antibiotics such as Doxycycline or Minocycline and/or oral hormone therapy such as Oral Contraceptive Pills or Spironolactone may be prescribed  In the most severe cases, isotretinoin (Accutane) may be used       In general, it is usually best to start with acne medications that are least likely to cause side effects but are at the same time capable of addressing the specific causes for the acne  Some patients have a good result with just one medication, but many will need to use a combination of treatments: two or more different topical agents or an oral medication plus a topical medication  Another treatment used for acne may include corticosteroid injections, which are used to help relieve pain, decrease the size, and encourage the healing of large, inflamed acne nodules  Also, dermatologists sometimes perform “acne surgery,” using a fine needle, a pointed blade, or an instrument known as a comedone extractor to mechanically clean out clogged pores  One must always weigh the risk for inducing a scar with the potential benefits of any procedure  Prior treatment with topical retinoids can “loosen” whiteheads and blackheads and make it easier to physically remove such lesions  Heat-based devices, and light and laser therapy are being studied to see whether there is any role for such treatments in mild to moderate acne  At this time, there is not enough evidence to make general recommendations about their use  TOPICAL ACNE MEDICATIONS    WHAT KIND OF TOPICALS ARE THERE? • Benzoyl peroxide (BP) helps to fight inflammation and is anti-microbial (kills bacteria, viruses, and other microorganisms) and is believed to help prevent resistance of bacteria to topical antibiotics  A benzoyl peroxide “wash” may be recommended for use on large areas such as the chest and/or back  Mild irritation and dryness are common when first using benzoyl peroxide-containing products  Be careful because benzoyl peroxide can bleach towels and clothing! • Retinoids (such as adapalene, tretinoin, or tazarotene) unplug the oil glands by helping peel away the layers of skin and other things plugging the opening of the glands  Mild irritation and dryness are common when first using these products   Facial waxing and other skin procedures can lead to excessive irritation and should be avoided during retinoid therapy  • Antibiotics fight bacteria and help decrease inflammation  Topical antibiotics commonly used in acne include clindamycin, erythromycin, and combination agents (such as clindamycin/benzoyl peroxide or erythromycin/benzoyl peroxide)  Mild irritation and dryness are common when first using these products  Typically, topical antibiotics should not be used alone as treatment for acne  • Other topical agents include salicylic acid, azelaic acid, dapsone, and sulfacetamide  Mild irritation and dryness can also occur when first using these products  USING YOUR TOPICAL TREATMENTS LIKE A PRO  • Apply topical medications only to clean, dry skin  Topical medications may lead to significant dryness of the affected areas  To minimize this, wait 15-20 minutes after washing before applying your topical medication  • These medications work deep in the skin to prevent new breakouts  “Spot treatment” of individual pimples does not do much  When applying topical medications to the face, use the “5-dot” method  Start by placing a small pea-sized amount of the medication on your finger  Then, place “dots” in each of five locations of your face: Mid-forehead, each cheek, nose, and chin  Next, rub the medication into the entire area of skin - not just on individual pimples! Try to avoid the delicate skin around your eyes and corners of your mouth  • The medications are not magic! They take weeks if not months to work  Be patient and use your medicine on a daily basis or as directed for six weeks before asking if your skin looks better  Try not to miss more than one or two days each week when using your medications    • If you are starting a new medication, then try using it “every other night” or even “every third night ” Gradually work up to [employer] a day ”  This will give your skin time to adjust   • The same medications often come in various forms or formulations: Creams, ointments, lotions, gels, microspheres, or foams  Use the formulation that has been recommended and don't switch to other forms unless instructed  Some forms (such as alcohol based gels) may be more drying and less tolerable for certain skin types  • Sometimes individual medications are not as effective as a combination of two or more agents  The doctor may need to try several medications or combinations before finding the one that is best for that patient  • Moisturizer, sunscreen, and make-up may be used in conjunction with topical acne medications  In general, acne medications are applied first so they may directly contact the skin  Ask your physician to review specific application instructions! • It is especially important to always use sunscreen when using a topical retinoid or oral antibiotic  These drugs can make your skin more sensitive to the sun  In general, sunscreen gets applied AFTER any acne medications  • Don't stop using your acne medications just because your acne got better  Remember, the acne is better because of the medication, and prevention is the hanna to treatment  ORAL ACNE MEDICATIONS    ORAL ANTIBIOTICS  Antibiotics include tetracycline-class medicines (which include the most commonly used oral antibiotics for acne, minocycline, and doxycycline), erythromycin, trimethoprim-sulfamethoxazole, and occasionally cephalexin or azithromycin  These drugs may decrease bacteria and inflammation, and they are most effective for moderate-to-severe “inflammatory” acne  A product containing benzoyl peroxide should be used along with these antibiotics to help decrease the possibility of microbial resistance  Always take your acne pills with lots of water! A pill stuck in your throat can cause significant burning and irritation  Drink a full glass of water to ensure the pill gets into your stomach  Avoid “popping” a pill right before bed, and stay upright for at least 1 hour after taking a pill      HORMONAL THERAPY  Hormonal treatment is used only in females and usually consists of oral contraceptives (birth control pills)  Spironolactone is also sometimes used  ORAL CONTRACEPTIVE PILLS   This medication is also known as the “Birth Control Pill ”  We use it for hormonal regulation of acne  Take this medication as directed on the medication packet  NOTE: Try to find a regular time in your day to take the pill so that you don't forget  The best time is about half an hour after a meal or snack, or at bedtime  If you do forget to take your daily pill at the regular time, take one as soon as you remember and take the next at your regular scheduled time  WARNING: Do not take this medication until discussing it with your physician if you smoke, are pregnant (or trying to become pregnant or could be pregnant), have a personal history of breast cancer, have any artificial hardware or implants, have a condition called Factor 5 Leiden deficiency, have a family history of clotting problems, regularly have migraine headaches (especially with aura or due to flashing lights), or have any vaginal bleeding other than that associated with your menstrual cycle  SPIRONOLACTONE  This medication was originally used as a "water-pill" for patients with high blood pressure  Dermatologists use it in low doses to block the effects of male hormone on the hair follicle and oil glands  ORAL ISOTRETINOIN (used to be called the brand name “ACCUTANE”)  Isotretinoin, a derivative of vitamin A, is a powerful drug with several significant potential side effects  It is reserved for acne which is severe or when other medications have not worked well enough  It used to be sold under the brand name “Accutane” but now several versions exist       HAVING PROBLEMS WITH ANY OF YOUR TREATMENTS? You should not be able to see any of the medicines on your face   If you can see a white film on your skin after you apply the medication, there is too much medicine in that area and you need to apply a thinner coat and make sure it is spread evenly on your face  If your skin gets too dry, you can apply a light (“non-comedogenic”) moisturizer on top of your medicine or you may switch to using the medicine every other day instead of every day  If your skin is still too irritated, you may need to switch to a milder medication  If your skin is red and very itchy, you may be allergic to the medication and you should stop using it  COMMON POSSIBLE SIDE EFFECTS OF MEDICATIONS    • Retinoids - dryness, redness, increased sun sensitivity  • Spironolactone - headache, upset stomach, dizziness, breast tenderness or irregular periods  Usually these effects lessen with time  • Benzoyl peroxide - drying, redness, bleaching of clothes, towels and sheets, allergy  • Doxycycline - headaches; dizziness; irritation of the throat; nail changes; discoloration of teeth  • Sun sensitivity - even if you have dark skin, this medicine can make you burn more easily  Make sure you protect yourself from the sun, either by avoiding being outside between 11 AM and 3 PM, wearing and reapplying sunscreen/sunblock, or wearing sun protective clothing  • Nausea/vomiting - if you experience nausea with this medication, take it with food  • Minocycline - headaches; dizziness; vision problems,  irritation of the throat; discoloration of scars, gums, or teeth  Can rarely cause liver disease, joint pains, and flu-like symptoms  • If you should notice yellowing of the skin or any of the above, notify your doctor and stop using the medication  • Birth Control Pills - nausea; headaches; breast tenderness; feeling bloated; mood changes  • Spotting between periods may occur for the first three weeks of the medication, but this is not serious  It may last for two or three cycles  Please call us if the bleeding is heavier than a light flow or lasts for more than a few days  WHEN AND WHERE TO CALL WITH CONCERNS  We are here to help! If you experience any unusual symptoms, then stop taking or using the medication and call our office at [phone] (SKIN)  It is better to be safe than to be sorry!   Scribe Attestation    I,:   am acting as a scribe while in the presence of the attending physician :       I,:   personally performed the services described in this documentation    as scribed in my presence :

## 2022-12-14 NOTE — PATIENT INSTRUCTIONS
Topical medications:                Night: Start Adapalene 0 1% cream pea size amount to entire face every other night to start     Oral medications:        Morning and Night : Start doxycycline 100 mg twice a day by mouth for 2 months         Morning or Night: Start Spironolactone 25 mg by mouth once a day for 2 months     If too drying applying moisturizer prior to application   If still too drying contact office via my chart for an alternative medication  Spironolactone risks and benefits discussed  Reassured it can cause low blood pressure, breast tenderness   Advised to drink a lot of water   Follow up in 2 months         REMEMBER:  Always take your acne pills with lots of water! A pill stuck in your throat can cause significant burning and irritation  Drink a full glass of water to ensure the pill gets into your stomach  Avoid “popping” a pill right before bed, and stay upright for at least 1 hour after taking a pill

## 2022-12-19 DIAGNOSIS — N76.1 CHRONIC VAGINITIS: ICD-10-CM

## 2022-12-20 RX ORDER — FLUCONAZOLE 150 MG/1
TABLET ORAL
Qty: 2 TABLET | Refills: 0 | Status: SHIPPED | OUTPATIENT
Start: 2022-12-20 | End: 2022-12-23

## 2022-12-23 ENCOUNTER — TELEMEDICINE (OUTPATIENT)
Dept: BEHAVIORAL/MENTAL HEALTH CLINIC | Facility: CLINIC | Age: 22
End: 2022-12-23

## 2022-12-23 DIAGNOSIS — F32.1 CURRENT MODERATE EPISODE OF MAJOR DEPRESSIVE DISORDER WITHOUT PRIOR EPISODE (HCC): Primary | ICD-10-CM

## 2022-12-23 DIAGNOSIS — F41.1 GENERALIZED ANXIETY DISORDER: ICD-10-CM

## 2022-12-23 NOTE — PSYCH
Psychotherapy Provided: Individual Psychotherapy 50 minutes     Length of time in session: 47 minutes, follow up in 2 week    Encounter Diagnosis     ICD-10-CM    1  Current moderate episode of major depressive disorder without prior episode ([facility] [address] )  F32 1       2  Generalized anxiety disorder  F41 1           Goals addressed in session: Goal 1     Pain:      none    0    Current suicide risk : Low     D:  Met with Brenda for session  Discussed the drama her friends are having  She realizes how they bring it on themselves  She celebrated Paige with her boyfriend's house and it went well  She finished her semester of school with all  A's  She has met more people and has plans to go out tonight with her new plans  A:   Mod progress on tx plan goals  P:  To continue with tx plan goals  Behavioral Health Treatment Plan [facility]: Diagnosis and Treatment Plan explained to Krystal Max relates understanding diagnosis and is agreeable to Treatment Plan  Yes     Visit start and stop times:    12/23/22  Start Time: 1713  Stop Time: 1800  Total Visit Time: 47 minutes    Virtual Regular Visit    Verification of patient location:    Patient is located in the following state in which I hold an active license [place]      Assessment/Plan:    Problem List Items Addressed This Visit        Other    Generalized anxiety disorder    Current moderate episode of major depressive disorder without prior episode ([facility] ) - Primary       Goals addressed in session: Goal 1          Reason for visit is   Chief Complaint   Patient presents with   • Virtual Regular Visit        Encounter provider Laquita Dixon    Provider located at [address] [place] [id number] [phone]      Recent Visits  No visits were found meeting these conditions    Showing recent visits within past 7 days and meeting all other requirements  Today's Visits  Date Type Provider Dept   12/23/22 24[address] [facility] [address] hartman's visits and meeting all other requirements  Future Appointments  No visits were found meeting these conditions  Showing future appointments within next 150 days and meeting all other requirements       The patient was identified by name and date of birth  Donnie Rodriguez was informed that this is a telemedicine visit and that the visit is being conducted throughthe [email or web address] platform  She agrees to proceed     My office door was closed  No one else was in the room  She acknowledged consent and understanding of privacy and security of the video platform  The patient has agreed to participate and understands they can discontinue the visit at any time  Patient is aware this is a billable service  Subjective  Brenda Estrada is a 25 y o  female          HPI     Past Medical History:   Diagnosis Date   • Anxiety    • Chronic vaginitis 3/15/2017   • Concussion     Last Assessed: 11/15/2017   • Depression        Past Surgical History:   Procedure Laterality Date   • APPENDECTOMY     • INSERTION OF INTRAUTERINE DEVICE (IUD) N/A        Current Outpatient Medications   Medication Sig Dispense Refill   • adapalene (DIFFERIN) 0 1 % cream Apply topically daily at bedtime 45 g 3   • albuterol (PROVENTIL HFA,VENTOLIN HFA) 90 mcg/act inhaler Inhale 1-2 puffs  0   • clindamycin (CLEOCIN T) 1 % external solution Apply topically 2 (two) times a day 30 mL 0   • dicyclomine (BENTYL) 20 mg tablet Take 1 tablet (20 mg total) by mouth every 6 (six) hours 60 tablet 0   • doxycycline (ADOXA) 100 MG tablet Take 1 tablet (100 mg total) by mouth 2 (two) times a day 60 tablet 1   • fluconazole (DIFLUCAN) 150 mg tablet take 1 tablet by mouth AS A ONE TIME DOSE repeat in 3 days 2 tablet 0   • hydrOXYzine HCL (ATARAX) 25 mg tablet Take 25 mg by mouth every 8 (eight) hours as needed     • Levonorgestrel (MIRENA) 20 MCG/DAY IUD 1 each by Intrauterine route once     • spironolactone (ALDACTONE) 25 mg tablet Take 1 tablet (25 mg total) by mouth daily 30 tablet 1   • venlafaxine (EFFEXOR-XR) 75 mg 24 hr capsule Take 1 capsule (75 mg total) by mouth daily 90 capsule 1     No current facility-administered medications for this visit  No Known Allergies    Review of Systems    Video Exam    There were no vitals filed for this visit      Physical Exam

## 2023-01-03 ENCOUNTER — TELEMEDICINE (OUTPATIENT)
Dept: BEHAVIORAL/MENTAL HEALTH CLINIC | Facility: CLINIC | Age: 23
End: 2023-01-03

## 2023-01-03 DIAGNOSIS — F41.1 GENERALIZED ANXIETY DISORDER: ICD-10-CM

## 2023-01-03 DIAGNOSIS — F32.1 CURRENT MODERATE EPISODE OF MAJOR DEPRESSIVE DISORDER WITHOUT PRIOR EPISODE (HCC): Primary | ICD-10-CM

## 2023-01-03 NOTE — PSYCH
Psychotherapy Provided: Individual Psychotherapy 50 minutes     Length of time in session: 47 minutes, follow up in 2 week    Encounter Diagnosis     ICD-10-CM    1  Current moderate episode of major depressive disorder without prior episode ([facility] [address] )  F32 1       2  Generalized anxiety disorder  F41 1           Goals addressed in session: Goal 1     Pain:      none    0    Current suicide risk : Low     D:  Met with Brenda for session  Discussed the holidays,  Family and friends  The holidays were good, "no drama "  She went out with her new friend which went ok  Her Spring semester is going to be 18 credits, 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  She has this semester and graduates the fall 2023  Her degree will be in [place]  Discussed what can she do with her degree  "She is tired of the odd jobs "  She currently works 3 different odd jobs  Her mom is trying to hep her develop a budget but is difficult due to her numbers are never is consistent  She is "looking for consistency "    A:   Mod progress on tx plan goals  P:  To continue with tx plan goals  Behavioral Health Treatment Plan [facility]: Diagnosis and Treatment Plan explained to Jose Mixon relates understanding diagnosis and is agreeable to Treatment Plan  Yes     Visit start and stop times:    01/03/23  Start Time: 1715  Stop Time: 1800  Total Visit Time: 45 minutes    Virtual Regular Visit    Verification of patient location:    Patient is located in the following state in which I hold an active license [place]      Assessment/Plan:    Problem List Items Addressed This Visit    None      Goals addressed in session: Goal 1          Reason for visit is   No chief complaint on file         Encounter provider Lulú Pandey    Provider located at [address] [place] [address]  [address] [place] [address]  [phone]      Recent Visits  No visits were found meeting these conditions  Showing recent visits within past 7 days and meeting all other requirements  Today's Visits  Date Type Provider Dept   01/03/23 2400 [facility]  today's visits and meeting all other requirements  Future Appointments  No visits were found meeting these conditions  Showing future appointments within next 150 days and meeting all other requirements       The patient was identified by name and date of birth  Madai Espinoza was informed that this is a telemedicine visit and that the visit is being conducted throughthe [employer] platform  She agrees to proceed     My office door was closed  No one else was in the room  She acknowledged consent and understanding of privacy and security of the video platform  The patient has agreed to participate and understands they can discontinue the visit at any time  Patient is aware this is a billable service  Subjective  Brenda Olsen is a 25 y o  female          HPI     Past Medical History:   Diagnosis Date   • Anxiety    • Chronic vaginitis 3/15/2017   • Concussion     Last Assessed: 11/15/2017   • Depression        Past Surgical History:   Procedure Laterality Date   • APPENDECTOMY     • INSERTION OF INTRAUTERINE DEVICE (IUD) N/A        Current Outpatient Medications   Medication Sig Dispense Refill   • adapalene (DIFFERIN) 0 1 % cream Apply topically daily at bedtime 45 g 3   • albuterol (PROVENTIL HFA,VENTOLIN HFA) 90 mcg/act inhaler Inhale 1-2 puffs  0   • clindamycin (CLEOCIN T) 1 % external solution Apply topically 2 (two) times a day 30 mL 0   • dicyclomine (BENTYL) 20 mg tablet Take 1 tablet (20 mg total) by mouth every 6 (six) hours 60 tablet 0   • doxycycline (ADOXA) 100 MG tablet Take 1 tablet (100 mg total) by mouth 2 (two) times a day 60 tablet 1   • hydrOXYzine HCL (ATARAX) 25 mg tablet Take 25 mg by mouth every 8 (eight) hours as needed     • Levonorgestrel (MIRENA) 20 MCG/DAY IUD 1 each by Intrauterine route once     • spironolactone (ALDACTONE) 25 mg tablet Take 1 tablet (25 mg total) by mouth daily 30 tablet 1   • venlafaxine (EFFEXOR-XR) 75 mg 24 hr capsule Take 1 capsule (75 mg total) by mouth daily 90 capsule 1     No current facility-administered medications for this visit  No Known Allergies    Review of Systems    Video Exam    There were no vitals filed for this visit      Physical Exam
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
## 2023-01-18 ENCOUNTER — TELEMEDICINE (OUTPATIENT)
Dept: PSYCHIATRY | Facility: CLINIC | Age: 23
End: 2023-01-18

## 2023-01-18 DIAGNOSIS — F32.1 CURRENT MODERATE EPISODE OF MAJOR DEPRESSIVE DISORDER WITHOUT PRIOR EPISODE (HCC): ICD-10-CM

## 2023-01-18 DIAGNOSIS — Z13.21 SCREENING FOR ENDOCRINE, NUTRITIONAL, METABOLIC AND IMMUNITY DISORDER: ICD-10-CM

## 2023-01-18 DIAGNOSIS — F41.1 GENERALIZED ANXIETY DISORDER: ICD-10-CM

## 2023-01-18 DIAGNOSIS — Z13.0 SCREENING FOR ENDOCRINE, NUTRITIONAL, METABOLIC AND IMMUNITY DISORDER: ICD-10-CM

## 2023-01-18 DIAGNOSIS — Z13.29 SCREENING FOR ENDOCRINE, NUTRITIONAL, METABOLIC AND IMMUNITY DISORDER: ICD-10-CM

## 2023-01-18 DIAGNOSIS — Z13.228 SCREENING FOR ENDOCRINE, NUTRITIONAL, METABOLIC AND IMMUNITY DISORDER: ICD-10-CM

## 2023-01-18 DIAGNOSIS — F32.0 CURRENT MILD EPISODE OF MAJOR DEPRESSIVE DISORDER WITHOUT PRIOR EPISODE (HCC): Primary | ICD-10-CM

## 2023-01-18 RX ORDER — DOXYCYCLINE 100 MG/1
100 CAPSULE ORAL 2 TIMES DAILY
COMMUNITY

## 2023-01-18 RX ORDER — VENLAFAXINE HYDROCHLORIDE 75 MG/1
75 CAPSULE, EXTENDED RELEASE ORAL DAILY
Qty: 90 CAPSULE | Refills: 1 | Status: SHIPPED | OUTPATIENT
Start: 2023-02-07

## 2023-01-18 NOTE — PATIENT INSTRUCTIONS
Please take the following medications: Call if you are having any side effects or would like to make any changes to dose or frequency  Continue Effexor XR 75 mg daily  Continue Atarax 25 mg up to once daily PRN ONLY AS NEEDED for severe anxiety  Continue practicing coping strategies, affirmations, sleep hygiene, lifestyle modifications including adequate hydration, nutrition, and exercise, walks in nature, hobbies, and mindfulness techniques  Continue scheduled psychotherapy  Patient informed to continue annual follow-ups with PCP  Will order labs  Follow-up in 3 months  Look up "grounding techniques" and/or "anchoring demonstration" online and try a few to see what may work for you  Practice these skills before you need them, when you are not feeling too anxious or triggered  You can also search for free guided meditation videos online to help improve your head space when you are feeling very anxious or triggered  Recommendations regarding insomnia:  Wake-up at the same time every day  Refrain from "napping"  Refrain from going to bed unless you're tired  Utilize your bedroom for sleep only  Avoid use of electronics including television and/or cellphone/computers  Refrain from use of electronics including television and/or cellphones/computers prior to bed  Turn your alarm clock away so the light is not visible  Attempt relaxation using various means like reading if you're restless in bed for approximately 15-20 minutes  Participate in regular physical activities like exercise, although avoid approximately 3-4 hours prior to bed  Morning exercise is ideal   Avoid caffeine use prior to bedtime  Consider tapering down excessive use of caffeine  Avoid tobacco use prior to bedtime  Avoid alcohol use prior to bedtime    Consider reading "No More Sleepless nights" by Aguila Mathews, Ph D   Consider use of online resources including:  http://Boxxet/cbt-online-insomnia-treatment html  [employer]  com  CBT-I  Linda on your Smart Phone  Go! To Sleep by the [facility]  Healthy Diet   The American Heart Association and the [employer] of Cardiology have long recommended a healthy diet for not only patients who are at risk for atherosclerotic cardiovascular disease (ASCVD) but also the general public  In keeping with this evidence-based recommendation, the "2018 Guideline on the Management of Blood Cholesterol" stresses that a healthy diet should include adequate intake of these essentials:   Vegetables, fruits, and whole grains   Legumes and nuts   Low-fat dairy products   Low-fat poultry (without the skin)   Fish and seafood   Nontropical vegetable oils     The recent guidelines do provide room for cultural food preferences in a healthy diet, but in general, all patients should limit their intake of saturated and trans fats, sweets, sugar-sweetened beverages, and red meats  Physical Activity   In addition to a healthy diet, all patients should include regular physical activity in their weekly routines, at moderate to vigorous intensity  Any activity is better than nothing, so if your patients can't meet the recommendation of vigorous activity, moderate-intensity activity can still help them reduce their risk of ASCVD  Below are the American Heart Association's recommendations for physical activity per week (preferably spread throughout the week):      For Overall Cardiovascular Health and Lowering Cholesterol   At least 150 minutes of moderate-intensity physical activity (for example, 30 minutes, 5 days a week), or   At least 75 minutes of vigorous-intensity physical activity (for example, 25 minutes, 3 days a week); or   A combination of moderate- and vigorous-intensity aerobic activity, and   At least 2 days of moderate- to high-intensity muscle-strengthening activities (such as resistance weight training) for additional health benefits    Weight Control   It's important to work with patients to help them reach and maintain a healthy weight (Table 3)  You may need to suggest that they adjust their caloric intake to avoid weight gain or, in overweight and obese patients, to promote weight loss  Table 3  Body Mass Index         Please call the office nursing staff for medication issues including refills, problems getting medications, bothersome side effects, etc at [phone]  Please return for a follow up appointment as discussed and arrive approximately 15 minutes prior to your appointment time  If you are running late or are unable to attend your appointment, please call our [place] office at [phone], or if you were seen in the [place] office, please call [phone]  If you have thoughts of harming yourself or are otherwise in psychological crisis, do not hesitate to contact your [address], or 911 or go to the nearest emergency room    [facility] Crisis: [address] [facility] Crisis: [phone]  [address] Crisis: [address] Crisis: [address] Crisis: [address] Crisis: [address] Crisis: [phone]  National Suicide Prevention Hotline: 3-779.300.3891 or call 46 [phone]

## 2023-01-20 ENCOUNTER — TELEMEDICINE (OUTPATIENT)
Dept: BEHAVIORAL/MENTAL HEALTH CLINIC | Facility: CLINIC | Age: 23
End: 2023-01-20

## 2023-01-20 DIAGNOSIS — F32.1 CURRENT MODERATE EPISODE OF MAJOR DEPRESSIVE DISORDER WITHOUT PRIOR EPISODE (HCC): Primary | ICD-10-CM

## 2023-01-20 DIAGNOSIS — F41.1 GENERALIZED ANXIETY DISORDER: ICD-10-CM

## 2023-01-20 NOTE — PSYCH
Behavioral Health Psychotherapy Progress Note    Psychotherapy Provided: Individual Psychotherapy     1  Current moderate episode of major depressive disorder without prior episode ([address] )        2  Generalized anxiety disorder            Goals addressed in session: Goal 1 and Goal 2     DATA:  MSW met with Brenda  for session  Discussed how she has been doing since last session  "He is making new friends but,   "  And she brought up "a problem" she has when making new friends that is very distressing for her  She initially has thoughts of them, etc then, feels extremely guilty, "she would never cheat on her boyfriend   , what is wrong with her and what should she do "  Discussed what her thoughts and "what it means "  Reviewed tx plan goals and developed new tx plan  During this session, this clinician used the following therapeutic modalities: Client-centered Therapy, Cognitive Behavioral Therapy, Cognitive Processing Therapy, Solution-Focused Therapy and Supportive Psychotherapy    Substance Abuse was not addressed during this session  If the client is diagnosed with a co-occurring substance use disorder, please indicate any changes in the frequency or amount of use: na  Stage of change for addressing substance use diagnoses: No substance use/Not applicable    ASSESSMENT:  Aide Will presents with a Euthymic/ normal mood  her affect is Normal range and intensity, which is congruent, with her mood and the content of the session  The client has made progress on their goals  Aide Will presents with a none risk of suicide, none risk of self-harm, and none risk of harm to others  For any risk assessment that surpasses a "low" rating, a safety plan must be developed  A safety plan was indicated: no  If yes, describe in detail     PLAN: Between sessions, Brenda Annamaria Cunninghamger will begin addressing new tx plan goals   At the next session, the therapist will use Client-centered Therapy, Cognitive Behavioral Therapy, Cognitive Processing Therapy, Solution-Focused Therapy and Supportive Psychotherapy to address tx plan goals and new issues that arise since last session       Behavioral Health Treatment Plan and Discharge Planning: Esha Caridad is aware of and agrees to continue to work on their treatment plan  They have identified and are working toward their discharge goals  yes    Visit start and stop times:    01/20/23  Start Time: 0815  Stop Time: 0900  Total Visit Time: 45 minutes    Virtual Regular Visit    Verification of patient location:    Patient is located in the following state in which I hold an active license [place]      Assessment/Plan:    Problem List Items Addressed This Visit        Other    Generalized anxiety disorder    Current moderate episode of major depressive disorder without prior episode ([facility] [address] ) - Primary       Goals addressed in session: Goal 1          Reason for visit is   Chief Complaint   Patient presents with   • Virtual Regular Visit        Encounter provider Milagro Hart    Provider located at [address] [place] [id number] [phone]      Recent Visits  No visits were found meeting these conditions  Showing recent visits within past 7 days and meeting all other requirements  Today's Visits  Date Type Provider Dept   01/20/23 [address] [place] [address]  today's visits and meeting all other requirements  Future Appointments  No visits were found meeting these conditions  Showing future appointments within next 150 days and meeting all other requirements       The patient was identified by name and date of birth  Esha Mclean was informed that this is a telemedicine visit and that the visit is being conducted throughthe [employer] platform  She agrees to proceed     My office door was closed   No one else was in the room   She acknowledged consent and understanding of privacy and security of the video platform  The patient has agreed to participate and understands they can discontinue the visit at any time  Patient is aware this is a billable service  Subjective  Brenda Mejia is a 25 y o  female    HPI     Past Medical History:   Diagnosis Date   • Anxiety    • Chronic vaginitis 3/15/2017   • Concussion     Last Assessed: 11/15/2017   • Depression        Past Surgical History:   Procedure Laterality Date   • APPENDECTOMY     • INSERTION OF INTRAUTERINE DEVICE (IUD) N/A        Current Outpatient Medications   Medication Sig Dispense Refill   • adapalene (DIFFERIN) 0 1 % cream Apply topically daily at bedtime 45 g 3   • albuterol (PROVENTIL HFA,VENTOLIN HFA) 90 mcg/act inhaler Inhale 1-2 puffs  0   • clindamycin (CLEOCIN T) 1 % external solution Apply topically 2 (two) times a day 30 mL 0   • dicyclomine (BENTYL) 20 mg tablet Take 1 tablet (20 mg total) by mouth every 6 (six) hours 60 tablet 0   • doxycycline monohydrate (MONODOX) 100 mg capsule Take 100 mg by mouth 2 (two) times a day     • hydrOXYzine HCL (ATARAX) 25 mg tablet Take 25 mg by mouth every 8 (eight) hours as needed     • Levonorgestrel (MIRENA) 20 MCG/DAY IUD 1 each by Intrauterine route once     • spironolactone (ALDACTONE) 25 mg tablet Take 1 tablet (25 mg total) by mouth daily 30 tablet 1   • [START ON 2/7/2023] venlafaxine (EFFEXOR-XR) 75 mg 24 hr capsule Take 1 capsule (75 mg total) by mouth daily Do not start before February 7, 2023  90 capsule 1     No current facility-administered medications for this visit  No Known Allergies    Review of Systems    Video Exam    There were no vitals filed for this visit      Physical Exam

## 2023-01-20 NOTE — BH TREATMENT PLAN
Esha Mclean  2000       Date of Initial Treatment Plan: 7/12/22  Date of Current Treatment Plan: 01/20/23    Treatment Plan Number 1     Strengths/Personal Resources for Self Care: hard working, loyal     Diagnosis:   1  Current moderate episode of major depressive disorder without prior episode ([address] )        2  Generalized anxiety disorder            Area of Needs:  When busy with school and work I loose things for myself  Long Term Goal 1: I want to balance out my time with work, school and time for myself and not feel guilty for taking time with myself  Target Date: 7/23  Completion Date: N/A         Short Term Objectives for Goal 1:  I will set a time at the end of the night to be done with all of my school work  If I have a big test or project then I will take that extra time to do but, then revert back to my time I set to end my school work  I will time on the weekends for myself  I will take my meds  I will remember that my excessive guilt is a symptom of my anxiety but, also something what helps us do the right thing  Long Term Goal 2:     Target Date:   Completion Date: N/A    Short Term Objectives for Goal 2:           Long Term Goal # 3: N/A     Target Date: N/A  Completion Date: N/A    Short Term Objectives for Goal 3: N/A    GOAL 1: Modality: Individual 2x per month   Completion Date NA and The person(s) responsible for carrying out the plan is  Brenda  GOAL 2: Modality: NA     GOAL 3: Modality:NA       Behavioral Health Treatment Plan [facility]: Diagnosis and Treatment Plan explained to Jessica Rios relates understanding diagnosis and is agreeable to Treatment Plan         Client Comments : Please share your thoughts, feelings, need and/or experiences regarding your treatment plan:     Vito Goldberg, [email or web address] [email or web address]    Esha Mclean, 2000, actively participated in the review and update of this treatment plan during a virtual session, using the [employer] platform  Sandy Manuelhome  provided verbal consent on 1/20/2023 at 9:00 aM  The treatment plan was transcribed into the Electronic Health Record at a later time

## 2023-01-25 DIAGNOSIS — L70.0 ACNE VULGARIS: ICD-10-CM

## 2023-01-25 RX ORDER — SPIRONOLACTONE 25 MG/1
25 TABLET ORAL DAILY
Qty: 30 TABLET | Refills: 0 | Status: SHIPPED | OUTPATIENT
Start: 2023-01-25 | End: 2023-02-24

## 2023-02-03 ENCOUNTER — TELEMEDICINE (OUTPATIENT)
Dept: BEHAVIORAL/MENTAL HEALTH CLINIC | Facility: CLINIC | Age: 23
End: 2023-02-03

## 2023-02-03 DIAGNOSIS — F41.1 GENERALIZED ANXIETY DISORDER: ICD-10-CM

## 2023-02-03 DIAGNOSIS — F32.1 CURRENT MODERATE EPISODE OF MAJOR DEPRESSIVE DISORDER WITHOUT PRIOR EPISODE (HCC): Primary | ICD-10-CM

## 2023-02-03 NOTE — PSYCH
Behavioral Health Psychotherapy Progress Note    Psychotherapy Provided: Individual Psychotherapy     1  Current moderate episode of major depressive disorder without prior episode ([address] )        2  Generalized anxiety disorder            Goals addressed in session: Goal 1 and Goal 2     DATA:  MSW met with Brenda  for session  She is going out with friends tonight  Tomorrow she is going out to see another friend  She invites him out to do things but, he "doesn't do anything other than stay home and play video "   She has talked to him about it  Discussed another on going conversation of "his needing to help her around the house,"  She was offered a promotion at [employer] which is more hours  Discussed how she can do all the hours at all her jobs and her 25 credit course load  Talked to her parents and her boyfriend about needing help      During this session, this clinician used the following therapeutic modalities: Client-centered Therapy, Cognitive Behavioral Therapy, Cognitive Processing Therapy, Solution-Focused Therapy and Supportive Psychotherapy    Substance Abuse was not addressed during this session  If the client is diagnosed with a co-occurring substance use disorder, please indicate any changes in the frequency or amount of use: na  Stage of change for addressing substance use diagnoses: No substance use/Not applicable    ASSESSMENT:  Dossie Riedel presents with a Euthymic/ normal mood  her affect is Normal range and intensity, which is congruent, with her mood and the content of the session  The client has made progress on their goals  Dossie Riedel presents with a none risk of suicide, none risk of self-harm, and none risk of harm to others  For any risk assessment that surpasses a "low" rating, a safety plan must be developed  A safety plan was indicated: no  If yes, describe in detail     PLAN: Between sessions, Brenda Simpson will begin addressing new tx plan goals   At the next session, the therapist will use Client-centered Therapy, Cognitive Behavioral Therapy, Cognitive Processing Therapy, Solution-Focused Therapy and Supportive Psychotherapy to address tx plan goals and new issues that arise since last session       Behavioral Health Treatment Plan and Discharge Planning: Donnie Rodriguez is aware of and agrees to continue to work on their treatment plan  They have identified and are working toward their discharge goals  yes    Visit start and stop times:    02/03/23  Start Time: 1711  Stop Time: 1800  Total Visit Time: 49 minutes    Virtual Regular Visit    Verification of patient location:    Patient is located in the following state in which I hold an active license [place]      Assessment/Plan:    Problem List Items Addressed This Visit    None      Goals addressed in session: Goal 1          Reason for visit is   No chief complaint on file  Encounter provider Susan Perry    Provider located at [address] [place] [id number] [phone]      Recent Visits  No visits were found meeting these conditions  Showing recent visits within past 7 days and meeting all other requirements  Today's Visits  Date Type Provider Dept   02/03/23 [address] [place] [address] today's visits and meeting all other requirements  Future Appointments  No visits were found meeting these conditions  Showing future appointments within next 150 days and meeting all other requirements       The patient was identified by name and date of birth  Donnie Rodriguez was informed that this is a telemedicine visit and that the visit is being conducted throughthe [employer] platform  She agrees to proceed     My office door was closed  No one else was in the room  She acknowledged consent and understanding of privacy and security of the video platform   The patient has agreed to participate and understands they can discontinue the visit at any time  Patient is aware this is a billable service  Subjective  Brenda Salas Res is a 25 y o  female    HPI     Past Medical History:   Diagnosis Date   • Anxiety    • Chronic vaginitis 3/15/2017   • Concussion     Last Assessed: 11/15/2017   • Depression        Past Surgical History:   Procedure Laterality Date   • APPENDECTOMY     • INSERTION OF INTRAUTERINE DEVICE (IUD) N/A        Current Outpatient Medications   Medication Sig Dispense Refill   • adapalene (DIFFERIN) 0 1 % cream Apply topically daily at bedtime 45 g 3   • albuterol (PROVENTIL HFA,VENTOLIN HFA) 90 mcg/act inhaler Inhale 1-2 puffs  0   • clindamycin (CLEOCIN T) 1 % external solution Apply topically 2 (two) times a day 30 mL 0   • dicyclomine (BENTYL) 20 mg tablet Take 1 tablet (20 mg total) by mouth every 6 (six) hours 60 tablet 0   • doxycycline monohydrate (MONODOX) 100 mg capsule Take 100 mg by mouth 2 (two) times a day     • hydrOXYzine HCL (ATARAX) 25 mg tablet Take 25 mg by mouth every 8 (eight) hours as needed     • Levonorgestrel (MIRENA) 20 MCG/DAY IUD 1 each by Intrauterine route once     • spironolactone (ALDACTONE) 25 mg tablet Take 1 tablet (25 mg total) by mouth daily 30 tablet 0   • [START ON 2/7/2023] venlafaxine (EFFEXOR-XR) 75 mg 24 hr capsule Take 1 capsule (75 mg total) by mouth daily Do not start before February 7, 2023  90 capsule 1     No current facility-administered medications for this visit  No Known Allergies    Review of Systems    Video Exam    There were no vitals filed for this visit      Physical Exam

## 2023-02-20 DIAGNOSIS — L70.0 ACNE VULGARIS: ICD-10-CM

## 2023-02-20 RX ORDER — SPIRONOLACTONE 25 MG/1
TABLET ORAL
Qty: 30 TABLET | Refills: 0 | Status: SHIPPED | OUTPATIENT
Start: 2023-02-20 | End: 2023-02-22 | Stop reason: SDUPTHER

## 2023-02-22 ENCOUNTER — TELEMEDICINE (OUTPATIENT)
Dept: DERMATOLOGY | Age: 23
End: 2023-02-22

## 2023-02-22 DIAGNOSIS — L70.0 ACNE VULGARIS: Primary | ICD-10-CM

## 2023-02-22 RX ORDER — DOXYCYCLINE 100 MG/1
100 TABLET ORAL 2 TIMES DAILY
Qty: 60 TABLET | Refills: 1 | Status: SHIPPED | OUTPATIENT
Start: 2023-02-22 | End: 2023-03-24

## 2023-02-22 RX ORDER — SPIRONOLACTONE 25 MG/1
25 TABLET ORAL 2 TIMES DAILY
Qty: 180 TABLET | Refills: 1 | Status: SHIPPED | OUTPATIENT
Start: 2023-02-22

## 2023-02-22 RX ORDER — AZELAIC ACID 0.15 G/G
1 GEL TOPICAL 2 TIMES DAILY
Qty: 30 G | Refills: 0 | Status: SHIPPED | OUTPATIENT
Start: 2023-02-22

## 2023-02-22 NOTE — PATIENT INSTRUCTIONS
Topical medications:                  Morning and Night: Start Azelaic acid 15% gel twice a day topically to face until next visit  Night: Continue Adapalene 0 1% cream pea size amount to entire back until next visit      Oral medications:                                                                      Morning and Night: Continue Spironolactone 25 mg by mouth twice a day for 2 months   Morning and Night: Restart Doxycyline 100 mg twice a day by mouth for 2 months with food and full glass of water      Continue gentle skin care  Follow up 2 months         REMEMBER:  Always take your acne pills with lots of water! A pill stuck in your throat can cause significant burning and irritation  Drink a full glass of water to ensure the pill gets into your stomach  Avoid “popping” a pill right before bed, and stay upright for at least 1 hour after taking a pill

## 2023-02-22 NOTE — PROGRESS NOTES
Virtual Regular Visit    Verification of patient location:    Patient is located in the following state in which I hold an active license [place]      Assessment/Plan:    Problem List Items Addressed This Visit    None           Reason for visit is   Chief Complaint   Patient presents with   • Virtual Regular Visit        Encounter provider Frederick Nguyen MD    Provider located at [place] [id number] [phone]      Recent Visits  No visits were found meeting these conditions  Showing recent visits within past 7 days and meeting all other requirements  Future Appointments  No visits were found meeting these conditions  Showing future appointments within next 150 days and meeting all other requirements       The patient was identified by name and date of birth  Bret Miller was informed that this is a telemedicine visit and that the visit is being conducted through the [facility]  She agrees to proceed     My office door was closed  The patient was notified the following individuals were present in the room Jaswant CRESPO   She acknowledged consent and understanding of privacy and security of the video platform  The patient has agreed to participate and understands they can discontinue the visit at any time  Patient is aware this is a billable service  Subjective  Brenda Smith is a 25 y o  female following up on acne          HPI     Past Medical History:   Diagnosis Date   • Anxiety    • Chronic vaginitis 3/15/2017   • Concussion     Last Assessed: 11/15/2017   • Depression        Past Surgical History:   Procedure Laterality Date   • APPENDECTOMY     • INSERTION OF INTRAUTERINE DEVICE (IUD) N/A        Current Outpatient Medications   Medication Sig Dispense Refill   • adapalene (DIFFERIN) 0 1 % cream Apply topically daily at bedtime 45 g 3   • albuterol (PROVENTIL HFA,VENTOLIN HFA) 90 mcg/act inhaler Inhale 1-2 puffs  0   • clindamycin (CLEOCIN T) 1 % external solution Apply topically 2 (two) times a day 30 mL 0   • dicyclomine (BENTYL) 20 mg tablet Take 1 tablet (20 mg total) by mouth every 6 (six) hours 60 tablet 0   • doxycycline monohydrate (MONODOX) 100 mg capsule Take 100 mg by mouth 2 (two) times a day     • hydrOXYzine HCL (ATARAX) 25 mg tablet Take 25 mg by mouth every 8 (eight) hours as needed     • Levonorgestrel (MIRENA) 20 MCG/DAY IUD 1 each by Intrauterine route once     • spironolactone (ALDACTONE) 25 mg tablet take 1 tablet by mouth once daily 30 tablet 0   • venlafaxine (EFFEXOR-XR) 75 mg 24 hr capsule Take 1 capsule (75 mg total) by mouth daily Do not start before February 7, 2023  90 capsule 1     No current facility-administered medications for this visit  No Known Allergies    Review of Systems    Video Exam    There were no vitals filed for this visit  Physical Exam     I spent 5 5 minutes directly with the patient during this visit    [facility]     Patient Name: Lenka Postal  Encounter Date: 02/22/2023     Have you been cared for by a [place] [address] Dermatologist in the last 3 years and, if so, which description applies to you? Yes  I have been here within the last 3 years, and my medical history has NOT changed since that time  I am FEMALE/of child-bearing potential     REVIEW OF SYSTEMS:  Have you recently had or currently have any of the following? · No changes in my recent health  PAST MEDICAL HISTORY:  Have you personally ever had or currently have any of the following? If "YES," then please provide more detail  · No changes in my medical history  FAMILY HISTORY:  Any "first degree relatives" (parent, brother, sister, or child) with the following? • No changes in my family's known health     PATIENT EXPERIENCE:    • Do you want the Dermatologist to perform a COMPLETE skin exam today including a clinical examination under the "bra and underwear" areas?  NO  • If necessary, do we have your permission to call and leave a detailed message on your Preferred Phone number that includes your specific medical information?   Yes      No Known Allergies   Current Outpatient Medications:   •  adapalene (DIFFERIN) 0 1 % cream, Apply topically daily at bedtime, Disp: 45 g, Rfl: 3  •  albuterol (PROVENTIL HFA,VENTOLIN HFA) 90 mcg/act inhaler, Inhale 1-2 puffs, Disp: , Rfl: 0  •  clindamycin (CLEOCIN T) 1 % external solution, Apply topically 2 (two) times a day, Disp: 30 mL, Rfl: 0  •  dicyclomine (BENTYL) 20 mg tablet, Take 1 tablet (20 mg total) by mouth every 6 (six) hours, Disp: 60 tablet, Rfl: 0  •  doxycycline monohydrate (MONODOX) 100 mg capsule, Take 100 mg by mouth 2 (two) times a day, Disp: , Rfl:   •  hydrOXYzine HCL (ATARAX) 25 mg tablet, Take 25 mg by mouth every 8 (eight) hours as needed, Disp: , Rfl:   •  Levonorgestrel (MIRENA) 20 MCG/DAY IUD, 1 each by Intrauterine route once, Disp: , Rfl:   •  spironolactone (ALDACTONE) 25 mg tablet, take 1 tablet by mouth once daily, Disp: 30 tablet, Rfl: 0  •  venlafaxine (EFFEXOR-XR) 75 mg 24 hr capsule, Take 1 capsule (75 mg total) by mouth daily Do not start before February 7, 2023 , Disp: 90 capsule, Rfl: 1          • Whom besides the patient is providing clinical information about today's encounter?   o NO ADDITIONAL HISTORIAN (patient alone provided history)    Physical Exam and Assessment/Plan by Diagnosis:    ACNE VULGARIS ("COMMON ACNE") FOLLOW UP     Physical Exam:  • Anatomic Location Affected:  Face, chest and back   • Morphological Description:  o Open/Closed Comedones:  - Several ("Moderate")  o Inflammatory Papules/Pustules:  - Several ("Moderate")  o Nodules:  - Few ("Mild")  o Scarring:  - Few ("Mild")  o Excoriations:  - Few ("Mild")  o Local Skin Redness/Erythema:  - Several ("Moderate")  o Local Skin Dryness/Scaling:  - Several ("Moderate")  o Local Skin Dyspigmentation:  - Few ("Mild")      Additional History of Present Condition:  Previous visit given adapalene at night, doxycyline 100 mg bid and spironolactone 25 mg daily  She states the adapalene was too drying even after moisturizing and using every other day  While on the oral medications she did not see some  improvement on face but a little on back and chest  Once she stopped the oral medication the flares on the back started again  She states while on Anastasiya birth control she did notice a lot of improvement in her acne and would like to know if that would help   Treatment plan:Based on a thorough discussion of this condition and the management approach to it (including a comprehensive discussion of the known risks, side effects and potential benefits of treatment), the patient (family) agrees to implement the following specific plan:     Topical medications:                  Morning and Night: Start Azelaic acid 15% gel twice a day topically to face until next visit  Night: Continue Adapalene 0 1% cream pea size amount to entire back until next visit      Oral medications:                                                                      Morning and Night: Continue Spironolactone 25 mg by mouth twice a day for 2 months   Morning and Night: Restart Doxycyline 100 mg twice a day by mouth for 2 months with food and full glass of water      · Continue gentle skin care  · Follow up 2 months         REMEMBER:  Always take your acne pills with lots of water! A pill stuck in your throat can cause significant burning and irritation  Drink a full glass of water to ensure the pill gets into your stomach  Avoid “popping” a pill right before bed, and stay upright for at least 1 hour after taking a pill  ACNE:  WHAT ZIT ALL ABOUT? WHY DO I HAVE ACNE/PIMPLES? Your skin is made of layers  To keep the skin from becoming dry and cracked, the skin needs oil  The oil is made in little wells in the deeper layers in the skin    People with acne have glands that make more oil and are more easily plugged, causing the glands to swell  Hormones, bacteria and your inherited tendency to have acne all play a role  The medical term for “pimples” is acne or acne vulgaris (vulgaris means “common”)  Most people get some acne  Acne does not come from being dirty  Instead, it is an expected consequence of changes that occur during normal growth and development  Hormones, bacteria, and your family's tendency to have acne may all play a role  “Whiteheads” or “blackheads” are openings of the glands (glands are the oil factories) onto the surface of the skin  “Blackheads” are not caused by dirt blocking the pores; instead, they result from the oxidation reaction of oil and skin in the pores with the air (like a “rust” reaction)  WHAT ABOUT STRESS? Stress does not “cause” acne but it can make it worse  Make sure you get enough sleep and daily exercise! WHAT ABOUT FOODS/DIET? Try to eat a balanced, healthy diet  Some people feel that certain foods worsen their acne  While there aren't many studies available on this question, severe dietary changes are unlikely to help your acne and may be harmful to the health of your skin  If you find that a certain food seems to aggravate your acne, you may consider avoiding that food  Discuss this with your physician! WHAT CAUSES MY ACNE? There are four contributors to acne--the body's natural oil (sebum), clogged pores, bacteria (with the scientific name Propionibacterium acnes, or P  acnes, for short), and the body's reaction to the bacteria living in the clogged pores (which causes inflammation)  Here's what happens:    • Sebum is produced in the normal oil-making glands in the deeper layers of the skin and reaches the surface through the skin's pores  An increase in certain hormones occurs around the time of puberty, and these hormones trigger the oil glands to produce increased amounts of sebum    • Pores with excess oil tend to become clogged more easily  • At the same time, P  acnes--one of the many types of bacteria that normally live on everyone's skin--thrives in the excess oil and causes a skin reaction (inflammation)  • If a pore is clogged close to the surface, there is little inflammation  However, this results in the formation of “whiteheads” (closed comedones) or “blackheads” (open comedones) at the surface of the skin  • A plug that extends to, or forms a little deeper in the pore, or one that enlarges or ruptures may cause more inflammation  The result is red bumps (papules) and pus-filled pimples (pustules)  • If plugging happens in the deepest skin layer, the inflammation may be even more severe, resulting in the formation of nodules or cysts  When these types of acne heal, they may leave behind discolored areas or true scars  SKIN HYGIENE:  HOW SHOULD I [facility] MY SKIN? Acne does not come from being dirty, however, washing your face is part of taking good care of your skin and will help keep your face clear  Good skin hygiene is, therefore, critical to support any acne treatment plan  Here are several specific suggestions for practicing good skin hygiene and keeping your skin looking its best:    • You should wash acne-prone skin TWICE A DAY: Once in the morning and once in the evening  This does include any showers you take that day, so do not overdo it! • Do not scrub the skin with a washcloth or loofah as these can irritate and inflame your acne  Acne does not come from “dirt”, so it is not necessary to scrub the skin clean  In fact, scrubbing may lead to dryness and irritation that makes the acne even worse and harder for patients to tolerate acne medications  • Use a gentle facial moisturizing cleanser (Cetaphil Moisturizing Cleanser or Dove Fragrance-Free bar)  Avoid using soaps like Daphnie Dalton [address], [facility], or soft/liquid soaps as these products will dry your skin    • Do not use any over-the-counter “acne washes” without your doctor's specific instruction to do so  These products often contain salicylic acid or benzoyl peroxide  These ingredients can be helpful in clearing oil from the skin and reducing bacteria, but they may also be drying and can add to irritation  • Do not use exfoliating products with microbeads or brushes as these can cause irritation to the skin which can worsen the acne  • Facials and other treatments to remove, squeeze, or “clean out” pores, if done by a , can help the acne  They should not be done more than every 8 weeks  • Try not to “pop pimples” or pick at your acne as this can delay healing and may result in scarring or skin color changes (“dark spots”) that are often more noticeable than the acne itself  Picking/popping acne can also cause a serious skin infection  • Wash or change your pillow case once to twice a week, especially if you use products in your hair  • Wash the skin as soon as possible after playing sports or other activities that cause a lot of sweating  Also, pay attention to how your sports equipment (shoulder pads, helmet strap, etc ) might be making your acne worse  • When you use makeup, moisturizer, or sunscreen make sure that these products are labeled “non-comedogenic,” or “won't clog pores,” or “won't cause acne ”           WHAT ACNE TREATMENTS ARE AVAILABLE? Medications for acne try to stop the formation of new pimples by reducing or removing the oil, bacteria, and other things (like dead skin cells) that clog the pores  They can also decrease the inflammation or irritation response of the skin to bacteria  It may take from 6 to 8 weeks (about 2 months!) before you see any improvement and know if the medication is effective  It takes the layers of skin this long to regenerate  Remember, these medications do not “cure” the condition--the acne improves because of the medication   Therefore, treatment must be continued in order to prevent the return of acne lesions  There are many types of acne treatments  Some are applied to the skin (“topical” medications) and some are taken by mouth (“oral” medications)  In most cases of mild acne, the doctor will start with a topical medication  There are many different topical medications that are helpful for acne  If acne is more severe and it does not respond adequately to a topical medication, or if it covers large body surface areas such as the back and/or chest, oral antibiotics such as Doxycycline or Minocycline and/or oral hormone therapy such as Oral Contraceptive Pills or Spironolactone may be prescribed  In the most severe cases, isotretinoin (Accutane) may be used  In general, it is usually best to start with acne medications that are least likely to cause side effects but are at the same time capable of addressing the specific causes for the acne  Some patients have a good result with just one medication, but many will need to use a combination of treatments: two or more different topical agents or an oral medication plus a topical medication  Another treatment used for acne may include corticosteroid injections, which are used to help relieve pain, decrease the size, and encourage the healing of large, inflamed acne nodules  Also, dermatologists sometimes perform “acne surgery,” using a fine needle, a pointed blade, or an instrument known as a comedone extractor to mechanically clean out clogged pores  One must always weigh the risk for inducing a scar with the potential benefits of any procedure  Prior treatment with topical retinoids can “loosen” whiteheads and blackheads and make it easier to physically remove such lesions  Heat-based devices, and light and laser therapy are being studied to see whether there is any role for such treatments in mild to moderate acne   At this time, there is not enough evidence to make general recommendations about their use  TOPICAL ACNE MEDICATIONS    WHAT KIND OF TOPICALS ARE THERE? • Benzoyl peroxide (BP) helps to fight inflammation and is anti-microbial (kills bacteria, viruses, and other microorganisms) and is believed to help prevent resistance of bacteria to topical antibiotics  A benzoyl peroxide “wash” may be recommended for use on large areas such as the chest and/or back  Mild irritation and dryness are common when first using benzoyl peroxide-containing products  Be careful because benzoyl peroxide can bleach towels and clothing! • Retinoids (such as adapalene, tretinoin, or tazarotene) unplug the oil glands by helping peel away the layers of skin and other things plugging the opening of the glands  Mild irritation and dryness are common when first using these products  Facial waxing and other skin procedures can lead to excessive irritation and should be avoided during retinoid therapy  • Antibiotics fight bacteria and help decrease inflammation  Topical antibiotics commonly used in acne include clindamycin, erythromycin, and combination agents (such as clindamycin/benzoyl peroxide or erythromycin/benzoyl peroxide)  Mild irritation and dryness are common when first using these products  Typically, topical antibiotics should not be used alone as treatment for acne  • Other topical agents include salicylic acid, azelaic acid, dapsone, and sulfacetamide  Mild irritation and dryness can also occur when first using these products  USING YOUR TOPICAL TREATMENTS LIKE A PRO  • Apply topical medications only to clean, dry skin  Topical medications may lead to significant dryness of the affected areas  To minimize this, wait 15-20 minutes after washing before applying your topical medication  • These medications work deep in the skin to prevent new breakouts  “Spot treatment” of individual pimples does not do much  When applying topical medications to the face, use the “5-dot” method   Start by placing a small pea-sized amount of the medication on your finger  Then, place “dots” in each of five locations of your face: Mid-forehead, each cheek, nose, and chin  Next, rub the medication into the entire area of skin - not just on individual pimples! Try to avoid the delicate skin around your eyes and corners of your mouth  • The medications are not magic! They take weeks if not months to work  Be patient and use your medicine on a daily basis or as directed for six weeks before asking if your skin looks better  Try not to miss more than one or two days each week when using your medications  • If you are starting a new medication, then try using it “every other night” or even “every third night ” Gradually work up to [employer] a day ”  This will give your skin time to adjust   • The same medications often come in various forms or formulations: Creams, ointments, lotions, gels, microspheres, or foams  Use the formulation that has been recommended and don't switch to other forms unless instructed  Some forms (such as alcohol based gels) may be more drying and less tolerable for certain skin types  • Sometimes individual medications are not as effective as a combination of two or more agents  The doctor may need to try several medications or combinations before finding the one that is best for that patient  • Moisturizer, sunscreen, and make-up may be used in conjunction with topical acne medications  In general, acne medications are applied first so they may directly contact the skin  Ask your physician to review specific application instructions! • It is especially important to always use sunscreen when using a topical retinoid or oral antibiotic  These drugs can make your skin more sensitive to the sun  In general, sunscreen gets applied AFTER any acne medications  • Don't stop using your acne medications just because your acne got better   Remember, the acne is better because of the medication, and prevention is the key to treatment  ORAL ACNE MEDICATIONS    ORAL ANTIBIOTICS  Antibiotics include tetracycline-class medicines (which include the most commonly used oral antibiotics for acne, minocycline, and doxycycline), erythromycin, trimethoprim-sulfamethoxazole, and occasionally cephalexin or azithromycin  These drugs may decrease bacteria and inflammation, and they are most effective for moderate-to-severe “inflammatory” acne  A product containing benzoyl peroxide should be used along with these antibiotics to help decrease the possibility of microbial resistance  Always take your acne pills with lots of water! A pill stuck in your throat can cause significant burning and irritation  Drink a full glass of water to ensure the pill gets into your stomach  Avoid “popping” a pill right before bed, and stay upright for at least 1 hour after taking a pill  HORMONAL THERAPY  Hormonal treatment is used only in females and usually consists of oral contraceptives (birth control pills)  Spironolactone is also sometimes used  ORAL CONTRACEPTIVE PILLS   This medication is also known as the “Birth Control Pill ”  We use it for hormonal regulation of acne  Take this medication as directed on the medication packet  NOTE: Try to find a regular time in your day to take the pill so that you don't forget  The best time is about half an hour after a meal or snack, or at bedtime  If you do forget to take your daily pill at the regular time, take one as soon as you remember and take the next at your regular scheduled time     WARNING: Do not take this medication until discussing it with your physician if you smoke, are pregnant (or trying to become pregnant or could be pregnant), have a personal history of breast cancer, have any artificial hardware or implants, have a condition called Factor 5 Leiden deficiency, have a family history of clotting problems, regularly have migraine headaches (especially with aura or due to flashing lights), or have any vaginal bleeding other than that associated with your menstrual cycle  SPIRONOLACTONE  This medication was originally used as a "water-pill" for patients with high blood pressure  Dermatologists use it in low doses to block the effects of male hormone on the hair follicle and oil glands  ORAL ISOTRETINOIN (used to be called the brand name “ACCUTANE”)  Isotretinoin, a derivative of vitamin A, is a powerful drug with several significant potential side effects  It is reserved for acne which is severe or when other medications have not worked well enough  It used to be sold under the brand name “Accutane” but now several versions exist       HAVING PROBLEMS WITH ANY OF YOUR TREATMENTS? You should not be able to see any of the medicines on your face  If you can see a white film on your skin after you apply the medication, there is too much medicine in that area and you need to apply a thinner coat and make sure it is spread evenly on your face  If your skin gets too dry, you can apply a light (“non-comedogenic”) moisturizer on top of your medicine or you may switch to using the medicine every other day instead of every day  If your skin is still too irritated, you may need to switch to a milder medication  If your skin is red and very itchy, you may be allergic to the medication and you should stop using it  COMMON POSSIBLE SIDE EFFECTS OF MEDICATIONS    • Retinoids - dryness, redness, increased sun sensitivity  • Spironolactone - headache, upset stomach, dizziness, breast tenderness or irregular periods  Usually these effects lessen with time  • Benzoyl peroxide - drying, redness, bleaching of clothes, towels and sheets, allergy  • Doxycycline - headaches; dizziness; irritation of the throat; nail changes; discoloration of teeth  • Sun sensitivity - even if you have dark skin, this medicine can make you burn more easily    Make sure you protect yourself from the sun, either by avoiding being outside between 11 AM and 3 PM, wearing and reapplying sunscreen/sunblock, or wearing sun protective clothing  • Nausea/vomiting - if you experience nausea with this medication, take it with food  • Minocycline - headaches; dizziness; vision problems,  irritation of the throat; discoloration of scars, gums, or teeth  Can rarely cause liver disease, joint pains, and flu-like symptoms  • If you should notice yellowing of the skin or any of the above, notify your doctor and stop using the medication  • Birth Control Pills - nausea; headaches; breast tenderness; feeling bloated; mood changes  • Spotting between periods may occur for the first three weeks of the medication, but this is not serious  It may last for two or three cycles  Please call us if the bleeding is heavier than a light flow or lasts for more than a few days  WHEN AND WHERE TO CALL WITH CONCERNS  We are here to help! If you experience any unusual symptoms, then stop taking or using the medication and call our office at [phone] (SKIN)  It is better to be safe than to be sorry!

## 2023-02-23 ENCOUNTER — TELEMEDICINE (OUTPATIENT)
Dept: BEHAVIORAL/MENTAL HEALTH CLINIC | Facility: CLINIC | Age: 23
End: 2023-02-23

## 2023-02-23 DIAGNOSIS — F41.1 GENERALIZED ANXIETY DISORDER: ICD-10-CM

## 2023-02-23 DIAGNOSIS — F32.1 CURRENT MODERATE EPISODE OF MAJOR DEPRESSIVE DISORDER WITHOUT PRIOR EPISODE (HCC): Primary | ICD-10-CM

## 2023-02-23 NOTE — PSYCH
Behavioral Health Psychotherapy Progress Note    Psychotherapy Provided: Individual Psychotherapy     1  Current moderate episode of major depressive disorder without prior episode ([address] )        2  Generalized anxiety disorder            Goals addressed in session: goal 1, want to balance out my time with work, school and time for myself and not feel guilty for taking time with myself  DATA:  MSW met with Bredna  for session  She has been socializing with her friends  Discussed how she used to be with friends and people/"she wanted everyone to like her and be her friend  She is not like that now  She also used to on the other end of that spectrum and wanted nothing to do with anyone  "She is now trying to balance herself out "  Discussed her relationship with her boyfriend  She is very mature for her age  During this session, this clinician used the following therapeutic modalities: Client-centered Therapy, Cognitive Behavioral Therapy, Cognitive Processing Therapy, Solution-Focused Therapy and Supportive Psychotherapy    Substance Abuse was not addressed during this session  If the client is diagnosed with a co-occurring substance use disorder, please indicate any changes in the frequency or amount of use: na  Stage of change for addressing substance use diagnoses: No substance use/Not applicable    ASSESSMENT:  Bret Miller presents with a Euthymic/ normal mood  her affect is Normal range and intensity, which is congruent, with her mood and the content of the session  The client has made progress on their goals  Bret Miller presents with a none risk of suicide, none risk of self-harm, and none risk of harm to others  For any risk assessment that surpasses a "low" rating, a safety plan must be developed  A safety plan was indicated: no  If yes, describe in detail     PLAN: Between sessions, Brenda Smith will begin addressing new tx plan goals   At the next session, the therapist will use Client-centered Therapy, Cognitive Behavioral Therapy, Cognitive Processing Therapy, Solution-Focused Therapy and Supportive Psychotherapy to address tx plan goals and new issues that arise since last session       Behavioral Health Treatment Plan and Discharge Planning: Joselo Dunn is aware of and agrees to continue to work on their treatment plan  They have identified and are working toward their discharge goals  yes    Visit start and stop times:    02/23/23  Start Time: 1713  Stop Time: 1800  Total Visit Time: 47 minutes    Virtual Regular Visit    Verification of patient location:    Patient is located in the following state in which I hold an active license [place]      Assessment/Plan:    Problem List Items Addressed This Visit    None      Goals addressed in session: Goal 1          Reason for visit is   No chief complaint on file  Encounter provider Madison Rincon    Provider located at [address] [place] [id number] [phone]      Recent Visits  No visits were found meeting these conditions  Showing recent visits within past 7 days and meeting all other requirements  Future Appointments  No visits were found meeting these conditions  Showing future appointments within next 150 days and meeting all other requirements       The patient was identified by name and date of birth  Joselo Dunn was informed that this is a telemedicine visit and that the visit is being conducted throughthe [employer] platform  She agrees to proceed     My office door was closed  No one else was in the room  She acknowledged consent and understanding of privacy and security of the video platform  The patient has agreed to participate and understands they can discontinue the visit at any time  Patient is aware this is a billable service       Subjective  Brenda Contreras is a 25 y o  female Boris Vallejo HPI     Past Medical History:   Diagnosis Date   • Anxiety    • Chronic vaginitis 3/15/2017   • Concussion     Last Assessed: 11/15/2017   • Depression        Past Surgical History:   Procedure Laterality Date   • APPENDECTOMY     • INSERTION OF INTRAUTERINE DEVICE (IUD) N/A        Current Outpatient Medications   Medication Sig Dispense Refill   • adapalene (DIFFERIN) 0 1 % cream Apply topically daily at bedtime 45 g 3   • albuterol (PROVENTIL HFA,VENTOLIN HFA) 90 mcg/act inhaler Inhale 1-2 puffs  0   • Azelaic Acid 15 % cream Apply 1 application topically 2 (two) times a day Apply topically twice a day to face 30 g 0   • clindamycin (CLEOCIN T) 1 % external solution Apply topically 2 (two) times a day (Patient not taking: Reported on 2/22/2023) 30 mL 0   • dicyclomine (BENTYL) 20 mg tablet Take 1 tablet (20 mg total) by mouth every 6 (six) hours 60 tablet 0   • doxycycline (ADOXA) 100 MG tablet Take 1 tablet (100 mg total) by mouth 2 (two) times a day 60 tablet 1   • doxycycline monohydrate (MONODOX) 100 mg capsule Take 100 mg by mouth 2 (two) times a day (Patient not taking: Reported on 2/22/2023)     • hydrOXYzine HCL (ATARAX) 25 mg tablet Take 25 mg by mouth every 8 (eight) hours as needed     • Levonorgestrel (MIRENA) 20 MCG/DAY IUD 1 each by Intrauterine route once     • spironolactone (ALDACTONE) 25 mg tablet Take 1 tablet (25 mg total) by mouth 2 (two) times a day 180 tablet 1   • venlafaxine (EFFEXOR-XR) 75 mg 24 hr capsule Take 1 capsule (75 mg total) by mouth daily Do not start before February 7, 2023  90 capsule 1     No current facility-administered medications for this visit  No Known Allergies    Review of Systems    Video Exam    There were no vitals filed for this visit      Physical Exam

## 2023-03-01 ENCOUNTER — APPOINTMENT (OUTPATIENT)
Dept: LAB | Facility: CLINIC | Age: 23
End: 2023-03-01

## 2023-03-01 ENCOUNTER — TELEPHONE (OUTPATIENT)
Dept: DERMATOLOGY | Age: 23
End: 2023-03-01

## 2023-03-01 DIAGNOSIS — R76.8 POSITIVE ANA (ANTINUCLEAR ANTIBODY): ICD-10-CM

## 2023-03-01 DIAGNOSIS — Z13.29 SCREENING FOR ENDOCRINE, NUTRITIONAL, METABOLIC AND IMMUNITY DISORDER: ICD-10-CM

## 2023-03-01 DIAGNOSIS — Z13.228 SCREENING FOR ENDOCRINE, NUTRITIONAL, METABOLIC AND IMMUNITY DISORDER: ICD-10-CM

## 2023-03-01 DIAGNOSIS — Z13.21 SCREENING FOR ENDOCRINE, NUTRITIONAL, METABOLIC AND IMMUNITY DISORDER: ICD-10-CM

## 2023-03-01 DIAGNOSIS — Z13.0 SCREENING FOR ENDOCRINE, NUTRITIONAL, METABOLIC AND IMMUNITY DISORDER: ICD-10-CM

## 2023-03-01 NOTE — TELEPHONE ENCOUNTER
Called and reschedule virtual     Pt mentioned her pharmacy did not receive the prescription for the topical cream  Azelaic Acid 15 % cream     She receive her other medications      Please review

## 2023-03-02 ENCOUNTER — OFFICE VISIT (OUTPATIENT)
Dept: RHEUMATOLOGY | Facility: CLINIC | Age: 23
End: 2023-03-02

## 2023-03-02 VITALS
BODY MASS INDEX: 24.1 KG/M2 | WEIGHT: 136 LBS | HEIGHT: 63 IN | HEART RATE: 103 BPM | DIASTOLIC BLOOD PRESSURE: 63 MMHG | SYSTOLIC BLOOD PRESSURE: 86 MMHG

## 2023-03-02 DIAGNOSIS — E55.9 VITAMIN D DEFICIENCY: ICD-10-CM

## 2023-03-02 DIAGNOSIS — R76.8 POSITIVE ANA (ANTINUCLEAR ANTIBODY): Primary | ICD-10-CM

## 2023-03-02 DIAGNOSIS — M25.50 DIFFUSE ARTHRALGIA: ICD-10-CM

## 2023-03-02 DIAGNOSIS — T69.1XXD CHILBLAINS, SUBSEQUENT ENCOUNTER: ICD-10-CM

## 2023-03-02 LAB
25(OH)D3 SERPL-MCNC: 8.8 NG/ML (ref 30–100)
BACTERIA UR QL AUTO: NORMAL /HPF
BASOPHILS # BLD AUTO: 0.05 THOUSANDS/ÂΜL (ref 0–0.1)
BASOPHILS NFR BLD AUTO: 1 % (ref 0–1)
BILIRUB UR QL STRIP: NEGATIVE
C3 SERPL-MCNC: 119 MG/DL (ref 90–180)
C4 SERPL-MCNC: 32 MG/DL (ref 10–40)
CLARITY UR: CLEAR
COLOR UR: COLORLESS
CREAT UR-MCNC: 39.2 MG/DL
CRP SERPL QL: <3 MG/L
EOSINOPHIL # BLD AUTO: 0.13 THOUSAND/ÂΜL (ref 0–0.61)
EOSINOPHIL NFR BLD AUTO: 2 % (ref 0–6)
ERYTHROCYTE [DISTWIDTH] IN BLOOD BY AUTOMATED COUNT: 11.7 % (ref 11.6–15.1)
ERYTHROCYTE [SEDIMENTATION RATE] IN BLOOD: 4 MM/HOUR (ref 0–19)
EST. AVERAGE GLUCOSE BLD GHB EST-MCNC: 97 MG/DL
FOLATE SERPL-MCNC: >20 NG/ML (ref 3.1–17.5)
GLUCOSE UR STRIP-MCNC: NEGATIVE MG/DL
HBA1C MFR BLD: 5 %
HCT VFR BLD AUTO: 42.9 % (ref 34.8–46.1)
HGB BLD-MCNC: 14.3 G/DL (ref 11.5–15.4)
HGB UR QL STRIP.AUTO: NEGATIVE
IMM GRANULOCYTES # BLD AUTO: 0.01 THOUSAND/UL (ref 0–0.2)
IMM GRANULOCYTES NFR BLD AUTO: 0 % (ref 0–2)
KETONES UR STRIP-MCNC: NEGATIVE MG/DL
LEUKOCYTE ESTERASE UR QL STRIP: NEGATIVE
LYMPHOCYTES # BLD AUTO: 2.48 THOUSANDS/ÂΜL (ref 0.6–4.47)
LYMPHOCYTES NFR BLD AUTO: 34 % (ref 14–44)
MCH RBC QN AUTO: 29.7 PG (ref 26.8–34.3)
MCHC RBC AUTO-ENTMCNC: 33.3 G/DL (ref 31.4–37.4)
MCV RBC AUTO: 89 FL (ref 82–98)
MONOCYTES # BLD AUTO: 0.46 THOUSAND/ÂΜL (ref 0.17–1.22)
MONOCYTES NFR BLD AUTO: 6 % (ref 4–12)
NEUTROPHILS # BLD AUTO: 4.11 THOUSANDS/ÂΜL (ref 1.85–7.62)
NEUTS SEG NFR BLD AUTO: 57 % (ref 43–75)
NITRITE UR QL STRIP: NEGATIVE
NON-SQ EPI CELLS URNS QL MICRO: NORMAL /HPF
NRBC BLD AUTO-RTO: 0 /100 WBCS
PH UR STRIP.AUTO: 6.5 [PH]
PLATELET # BLD AUTO: 349 THOUSANDS/UL (ref 149–390)
PMV BLD AUTO: 9.1 FL (ref 8.9–12.7)
PROT UR STRIP-MCNC: NEGATIVE MG/DL
PROT UR-MCNC: <6 MG/DL
RBC # BLD AUTO: 4.82 MILLION/UL (ref 3.81–5.12)
RBC #/AREA URNS AUTO: NORMAL /HPF
SP GR UR STRIP.AUTO: 1.01 (ref 1–1.03)
TSH SERPL DL<=0.05 MIU/L-ACNC: 0.94 UIU/ML (ref 0.45–4.5)
UROBILINOGEN UR STRIP-ACNC: <2 MG/DL
VIT B12 SERPL-MCNC: 466 PG/ML (ref 100–900)
WBC # BLD AUTO: 7.24 THOUSAND/UL (ref 4.31–10.16)
WBC #/AREA URNS AUTO: NORMAL /HPF

## 2023-03-02 RX ORDER — ERGOCALCIFEROL 1.25 MG/1
50000 CAPSULE ORAL WEEKLY
Qty: 8 CAPSULE | Refills: 0 | Status: SHIPPED | OUTPATIENT
Start: 2023-03-02

## 2023-03-02 NOTE — PROGRESS NOTES
Assessment and Plan:   Ms Abdoulaye Young a 23-year-old female with history significant for irritable bowel syndrome with constipation, anxiety and depression who presents for a follow-up of of a positive ABEL and chilblains     - Brenda presents today for a follow-up of chilblains affecting her bilateral toes which we discussed managing as an independent condition for now but given the findings of a positive ABEL and one occurrence of a positive lupus anticoagulant as well as prior symptoms which have since improved including night sweats, dry mouth, recurrent painful mouth ulcerations and arthralgias I would like to continue monitoring her for a connective tissue disease  Follow-up blood work will be done at our office visits but it is reassuring to note that subjectively she is doing very well at this time and the chilblains also resolved with conservative measures such as keeping her extremities warm  - In regards to the joint pains she experiences she will continue managing with ibuprofen as needed  If the ankle/knee symptoms persist or worsen she may be agreeable to considering a trial of physical therapy  I will provide her with a school note when needed to limit certain exercises in order not to aggravate her joint pains     - For the vitamin D deficiency I have prescribed her vitamin D 50,000 international units once weekly for 8 weeks followed by maintenance therapy of 1000 to 2000 IU once daily over-the-counter        Plan:  Diagnoses and all orders for this visit:    Positive ABEL (antinuclear antibody)  -     CBC and differential; Future  -     Comprehensive metabolic panel; Future  -     C-reactive protein; Future  -     Sedimentation rate, automated; Future  -     C4 complement; Future  -     C3 complement; Future  -     Anti-DNA antibody, double-stranded;  Future  -     Urinalysis with microscopic  -     Protein / creatinine ratio, urine    Chilblains, subsequent encounter    Diffuse arthralgia    Vitamin D deficiency  -     ergocalciferol (VITAMIN D2) 50,000 units; Take 1 capsule (50,000 Units total) by mouth once a week      Activities as tolerated  Continue other medications as prescribed by PCP and other specialists  RTC in 1 year         HPI    INITIAL VISIT NOTE (12/2020):  Ms Yvon Washburn a 30-year-old female with history significant for irritable bowel syndrome with constipation, anxiety and depression, who presents for further evaluation of multiple complaints including recurrent mouth ulcerations and joint pains  Donnell Hamlin is referred by MAYRA Cruz for a rheumatology consult      Patient reports she has experienced joint pains affecting her low back/hip region and bilateral knees probably since her childhood, but she has noticed them to be more prominent and progressive over the past 2-3 years  Donnell Hamlin had seen orthopedics while she was in high school and had to utilize knee braces   She reports while the pain may be present on a low level intensity nearly every day, approximately once per week she will have a significant flare-up of the pain   She reports with resting she may get some relief but not every time   She does take over-the-counter ibuprofen as needed for the joint pains which helps   In terms of the low back pain this does not wake her up from sleep at night and is not associated with morning stiffness   She reports activities will generally worsen her pain and she does get some relief with resting   At times she may notice an achiness sensation in her hands   She denies any joint pains of her wrists, elbows, shoulders, ankles or feet   Intermittently she has noticed mild swelling affecting her knees   She experiences morning stiffness only affecting her knees which takes about 30-45 minutes to improve   She has had x-rays of her hips and knees done in 2018 and 2020 which were all normal      Over the past 6 months she has also noticed additional symptoms arise such as recurrent painful mouth ulcerations that appear like canker sores   They resolve spontaneously but she states over the past few months they have been present on a near constant basis   She has seen her primary care physician for this and had an HSV swab done which was normal   She has also been trialed on multiple medications including fluconazole, famciclovir, lidocaine mucosal solution and triamcinolone topical paste which have not helped significantly   She does not get any ulcers in her nose or in her genital region, but does describe frequent yeast infections        In the past 6 months she has also had issues with lower abdominal pain flare-ups associated with constipation   She was seen by Gastroenterology and had a CT abdomen as well as colonoscopy done which were all normal   She was diagnosed with irritable bowel syndrome with constipation   She has noticed issues relating to her toes being very sensitive with color changes to white, but this does not always happen with cold exposure and seems to occur spontaneously   She does not notice any color changes affecting her hands      She denies fevers, chills, unintentional weight loss (has noticed night sweats), hair loss, dry eyes (does notice dry mouth), inflammatory eye disease, skin rash, psoriasis, photosensitivity, swollen glands, pleuritic chest pain, shortness of breath, blood in stools, blood clots, miscarriages, symptoms strongly suggestive of Raynaud's or family history of autoimmune disease   She does have 2 siblings who are healthy   She did have appendicitis when she was in 2nd grade but otherwise had a healthy childhood      In view of the constellation of symptoms she was seen by her primary care physician and had testing done in September 2020 which showed a normal ABEL, CK, rheumatoid factor, Lyme antibody profile and ESR   The CRP was minimally elevated at 5   Prior to that a CBC, CMP, TSH and vitamin-D were normal   She did have testing done in October 2018 as well which showed a normal ABEL, rheumatoid factor and HLA B27 antigen   The ESR and CRP were minimally elevated at 25 and 6, respectively         3/31/2021:  Patient presents for a follow-up today  I reviewed her labs which showed a positive ABEL by immunofluorescence 1:160 dense fine speckled pattern  The ABEL specificity, C3, C4, antiphospholipid antibody testing, urinalysis, urine protein creatinine ratio, hepatitis panel, ferritin, anti CCP antibody, celiac disease antibody profile, HIV testing and herpes antibodies were unremarkable      Since the last office visit she has not had any new complaints and continues to experience the night sweats, lower abdominal discomfort, Raynaud's like symptoms and joint pains  She states that the mouth ulcers resolved spontaneously and she has not had any in the past 1 and half months  She did try the over-the-counter zinc but this was not helpful        5/25/2022:  Patient presents for a follow-up today for re-evaluation of a positive ABEL  She was last seen in March 2021 at which time there were no concerning lab abnormalities or diagnosis and I had recommended a monitoring approach  She was scheduled for April 2022 but canceled her appointment as she was feeling well as the symptoms we discussed previously such as dry mouth and night sweats resolved with lowering her antidepressant medications  She reports the mouth sores also resolved spontaneously and have occurred on a rare occasion  She mentions the knee pain she was experiencing had improved after physical therapy  The Raynaud's like symptoms affecting her feet were still occurring but were manageable    No symptoms such as fevers, unintentional weight loss, alopecia, dry eyes, skin rash, nose ulcers, swollen glands, pleuritic chest pain or additional concerning joint pains/swelling/stiffness (does notice this in her TMJs, wrists and ankles but thinks this may be related to rolling her joints which she does when she is anxious)  Her main concern at this time is the appearance of red, blistering lesions that are very itchy occurring mostly on her bilateral toes as well as small spots on the palmar aspect of her bilateral 3rd fingers  Initially she was seen by her primary care physician and there were concerns that this may be secondary to athlete's foot but she did not respond to topical or oral antifungals  She was then referred to Dermatology and diagnosed with chilblains  To assess if this may be secondary to lupus she had blood work done which showed a positive ABEL 1:80 homogeneous pattern with a positive lupus anticoagulant  A C-reactive protein was slightly elevated at 6 3  Cryoglobulin, cryofibrinogen, antiphospholipid antibody panel, double-stranded DNA antibody and Sjogren's antibodies were normal   She was asked to establish with Rheumatology for these abnormal labs  She was prescribed topical triamcinolone ointment but this has not helped significantly  She reports with the warmer temperatures her symptoms have improved but are still present  Of note she denies a history of thrombotic events or miscarriages  She also discussed with her OBGYN changing her oral contraceptive to an IUD       8/29/2022:  Patient presents for a follow-up of chilblains  I reviewed the workup done after the last office visit which showed a normal ABEL specificity, ESR, C3, C4, urinalysis, urine protein creatinine ratio, anti neutrophilic cytoplasmic antibodies and CK  She had a recent repeat lupus anticoagulant drawn which is still pending  After the last office visit we discussed managing for chilblains and monitoring for the possibility of systemic lupus erythematosus  I started her on nifedipine at 10 mg once daily which she took for a month and reports that this seemed to help with her foot symptoms    They seem to have resolved except for very slight discoloration noted on the skin of her big toes  No painful or itchy toes  She reports with the nifedipine she did have some lightheadedness and tremors but checked her blood pressures daily and they remained normal   She would like to take the nifedipine on an as-needed basis and continue with conservative measures such as keeping her extremities warm  Otherwise she reports pain and stiffness that can affect her bilateral ankles but she states if she rolls her ankles and they pop then the symptoms resolve  She takes ibuprofen as needed which also helps  No other complaints noted today  3/2/2023:  Patient presents for a follow-up of chilblains  I reviewed the work-up done after the last office visit which showed a normal lupus anticoagulant  Her recent blood work shows an unremarkable CBC, CMP, ESR, CRP, C3, C4, urine analysis and urine protein creatinine ratio  A double-stranded DNA antibody is pending  A vitamin D level was low at 8 8  She reports since the last office visit she has been stable and not experienced any new complaints  Her joint pains are stable as well  She is able to manage the chilblains with keeping her core body temperature and extremities warm  She is no longer on the nifedipine  No other complaints at this time  The following portions of the patient's history were reviewed and updated as appropriate: allergies, current medications, past family history, past medical history, past social history, past surgical history and problem list       Review of Systems  Constitutional: Negative for weight change, fevers, chills, night sweats, fatigue  ENT/Mouth: Negative for hearing changes, ear pain, nasal congestion, sinus pain, hoarseness, sore throat, rhinorrhea, swallowing difficulty  Eyes: Negative for pain, redness, discharge, vision changes  Cardiovascular: Negative for chest pain, SOB, palpitations  Respiratory: Negative for cough, sputum, wheezing, dyspnea     Gastrointestinal: Negative for nausea, vomiting, diarrhea, constipation, pain, heartburn  Genitourinary: Negative for dysuria, urinary frequency, hematuria  Musculoskeletal: As per HPI  Skin: Positive for skin rash, color changes  Neuro: Negative for weakness, numbness, tingling, loss of consciousness  Psych: Negative for anxiety, depression  Heme/Lymph: Negative for easy bruising, bleeding, lymphadenopathy  Past Medical History:   Diagnosis Date   • Anxiety    • Chronic vaginitis 3/15/2017   • Concussion     Last Assessed: 11/15/2017   • Depression        Past Surgical History:   Procedure Laterality Date   • APPENDECTOMY     • INSERTION OF INTRAUTERINE DEVICE (IUD) N/A        Social History     Socioeconomic History   • Marital status: Single     Spouse name: Not on file   • Number of children: Not on file   • Years of education: Not on file   • Highest education level: Not on file   Occupational History   • Not on file   Tobacco Use   • Smoking status: Never   • Smokeless tobacco: Never   • Tobacco comments:     Vape - ceased use    Vaping Use   • Vaping Use: Former   • Substances: Nicotine, Flavoring   Substance and Sexual Activity   • Alcohol use: Yes     Comment: socially   • Drug use: No   • Sexual activity: Yes     Partners: Male     Birth control/protection: OCP   Other Topics Concern   • Not on file   Social History Narrative    Immunization status: up to date and documented       Social Determinants of Health     Financial Resource Strain: Not on file   Food Insecurity: Not on file   Transportation Needs: Not on file   Physical Activity: Not on file   Stress: Not on file   Social Connections: Not on file   Intimate Partner Violence: Not on file   Housing Stability: Not on file       Family History   Problem Relation Age of Onset   • Restless legs syndrome Mother    • Depression Mother    • Depression Father    • Colon cancer Paternal Grandmother    • Uterine cancer Paternal Grandmother    • OCD Sister    • Anxiety disorder Brother    • Hashimoto's thyroiditis Maternal Grandmother    • Breast cancer Neg Hx    • Ovarian cancer Neg Hx    • Cervical cancer Neg Hx        No Known Allergies      Current Outpatient Medications:   •  adapalene (DIFFERIN) 0 1 % cream, Apply topically daily at bedtime, Disp: 45 g, Rfl: 3  •  albuterol (PROVENTIL HFA,VENTOLIN HFA) 90 mcg/act inhaler, Inhale 1-2 puffs, Disp: , Rfl: 0  •  Azelaic Acid 15 % cream, Apply 1 application topically 2 (two) times a day Apply topically twice a day to face, Disp: 30 g, Rfl: 0  •  clindamycin (CLEOCIN T) 1 % external solution, Apply topically 2 (two) times a day, Disp: 30 mL, Rfl: 0  •  dicyclomine (BENTYL) 20 mg tablet, Take 1 tablet (20 mg total) by mouth every 6 (six) hours, Disp: 60 tablet, Rfl: 0  •  doxycycline (ADOXA) 100 MG tablet, Take 1 tablet (100 mg total) by mouth 2 (two) times a day, Disp: 60 tablet, Rfl: 1  •  doxycycline monohydrate (MONODOX) 100 mg capsule, Take 100 mg by mouth 2 (two) times a day, Disp: , Rfl:   •  ergocalciferol (VITAMIN D2) 50,000 units, Take 1 capsule (50,000 Units total) by mouth once a week, Disp: 8 capsule, Rfl: 0  •  hydrOXYzine HCL (ATARAX) 25 mg tablet, Take 25 mg by mouth every 8 (eight) hours as needed, Disp: , Rfl:   •  Levonorgestrel (MIRENA) 20 MCG/DAY IUD, 1 each by Intrauterine route once, Disp: , Rfl:   •  spironolactone (ALDACTONE) 25 mg tablet, Take 1 tablet (25 mg total) by mouth 2 (two) times a day, Disp: 180 tablet, Rfl: 1  •  venlafaxine (EFFEXOR-XR) 75 mg 24 hr capsule, Take 1 capsule (75 mg total) by mouth daily Do not start before February 7, 2023 , Disp: 90 capsule, Rfl: 1      Objective:    Vitals:    03/02/23 1433   BP: (!) 86/63   BP Location: Left arm   Patient Position: Sitting   Cuff Size: Adult   Pulse: 103   Weight: 61 7 kg (136 lb)   Height: 5' 3" (1 6 m)       Physical Exam  General: Well appearing, well nourished, in no distress  Oriented x 3, normal mood and affect    Ambulating without difficulty  Skin: Good turgor, no unusual bruising or prominent lesions  Hair: Normal texture and distribution  Nails: Normal color, no deformities  HEENT:  Head: Normocephalic, atraumatic  Eyes: Conjunctiva clear, sclera non-icteric, EOM intact  Nose: No external lesions, mucosa non-inflamed  Extremities: No amputations or deformities, cyanosis, edema  Neurologic: Alert and oriented  No focal neurological deficits appreciated  Psychiatric: Normal mood and affect  BALDEMAR Ruth    Rheumatology

## 2023-03-03 LAB — DSDNA AB SER-ACNC: <1 IU/ML (ref 0–9)

## 2023-03-03 NOTE — TELEPHONE ENCOUNTER
Called [facility]  Prior Kailyn Call was required which was started through cover my meds   Pt informed through my chart as well

## 2023-03-08 ENCOUNTER — TELEMEDICINE (OUTPATIENT)
Dept: BEHAVIORAL/MENTAL HEALTH CLINIC | Facility: CLINIC | Age: 23
End: 2023-03-08

## 2023-03-08 DIAGNOSIS — F32.1 CURRENT MODERATE EPISODE OF MAJOR DEPRESSIVE DISORDER WITHOUT PRIOR EPISODE (HCC): ICD-10-CM

## 2023-03-08 DIAGNOSIS — F41.1 GENERALIZED ANXIETY DISORDER: Primary | ICD-10-CM

## 2023-03-08 NOTE — PSYCH
Behavioral Health Psychotherapy Progress Note    Psychotherapy Provided: Individual Psychotherapy     1  Generalized anxiety disorder        2  Current moderate episode of major depressive disorder without prior episode ([facility] [address] )            Goals addressed in session: goal 1, want to balance out my time with work, school and time for myself and not feel guilty for taking time with myself  DATA:  URIAH met with Brenda  for session  Currently, on spring break from school  She picked up more hours at work  Discussed when she did [employer] and "failed at it "  She was not used to failing due to she always has done good in school  She thought "if she worked hard and obtained it then, you do good but, she learned that was not always the case "  She appeared to learn from that experience it "but, it was hard "  Discussed her strengths and her weaknesses and she "knows what they are "  She has her notifications for her emails turned on for work and "it is causing her anxiety  Discussed turning the notifications off because "she is doing it to herself "    During this session, this clinician used the following therapeutic modalities: Client-centered Therapy, Cognitive Behavioral Therapy, Cognitive Processing Therapy, Solution-Focused Therapy and Supportive Psychotherapy    Substance Abuse was not addressed during this session  If the client is diagnosed with a co-occurring substance use disorder, please indicate any changes in the frequency or amount of use: na  Stage of change for addressing substance use diagnoses: No substance use/Not applicable    ASSESSMENT:  Karina Apodaca presents with a Euthymic/ normal mood  her affect is Normal range and intensity, which is congruent, with her mood and the content of the session  The client has made progress on their goals  Karina Apodaca presents with a none risk of suicide, none risk of self-harm, and none risk of harm to others      For any risk assessment that surpasses a "low" rating, a safety plan must be developed  A safety plan was indicated: no  If yes, describe in detail     PLAN: Between sessions, Brenda Anders Ace will begin addressing new tx plan goals  At the next session, the therapist will use Client-centered Therapy, Cognitive Behavioral Therapy, Cognitive Processing Therapy, Solution-Focused Therapy and Supportive Psychotherapy to address tx plan goals and new issues that arise since last session       Behavioral Health Treatment Plan and Discharge Planning: Cori Fournier is aware of and agrees to continue to work on their treatment plan  They have identified and are working toward their discharge goals  yes    Visit start and stop times:    03/08/23  Start Time: 1713  Stop Time: 1800  Total Visit Time: 47 minutes    Virtual Regular Visit    Verification of patient location:    Patient is located in the following state in which I hold an active license [place]      Assessment/Plan:    Problem List Items Addressed This Visit    None      Goals addressed in session: Goal 1          Reason for visit is   No chief complaint on file  Encounter provider Meta Holstein    Provider located at [address] [place] [id number] [phone]      Recent Visits  No visits were found meeting these conditions  Showing recent visits within past 7 days and meeting all other requirements  Today's Visits  Date Type Provider Dept   03/08/23 [address] [place] [address]  today's visits and meeting all other requirements  Future Appointments  No visits were found meeting these conditions  Showing future appointments within next 150 days and meeting all other requirements       The patient was identified by name and date of birth   Cori Fournier was informed that this is a telemedicine visit and that the visit is being conducted throughthe [employer] platform  She agrees to proceed     My office door was closed  No one else was in the room  She acknowledged consent and understanding of privacy and security of the video platform  The patient has agreed to participate and understands they can discontinue the visit at any time  Patient is aware this is a billable service  Subjective  Brenda Cruz is a 25 y o  female          HPI     Past Medical History:   Diagnosis Date   • Anxiety    • Chronic vaginitis 3/15/2017   • Concussion     Last Assessed: 11/15/2017   • Depression        Past Surgical History:   Procedure Laterality Date   • APPENDECTOMY     • INSERTION OF INTRAUTERINE DEVICE (IUD) N/A        Current Outpatient Medications   Medication Sig Dispense Refill   • adapalene (DIFFERIN) 0 1 % cream Apply topically daily at bedtime 45 g 3   • albuterol (PROVENTIL HFA,VENTOLIN HFA) 90 mcg/act inhaler Inhale 1-2 puffs  0   • Azelaic Acid 15 % cream Apply 1 application topically 2 (two) times a day Apply topically twice a day to face 30 g 0   • clindamycin (CLEOCIN T) 1 % external solution Apply topically 2 (two) times a day 30 mL 0   • dicyclomine (BENTYL) 20 mg tablet Take 1 tablet (20 mg total) by mouth every 6 (six) hours 60 tablet 0   • doxycycline (ADOXA) 100 MG tablet Take 1 tablet (100 mg total) by mouth 2 (two) times a day 60 tablet 1   • doxycycline monohydrate (MONODOX) 100 mg capsule Take 100 mg by mouth 2 (two) times a day     • ergocalciferol (VITAMIN D2) 50,000 units Take 1 capsule (50,000 Units total) by mouth once a week 8 capsule 0   • hydrOXYzine HCL (ATARAX) 25 mg tablet Take 25 mg by mouth every 8 (eight) hours as needed     • Levonorgestrel (MIRENA) 20 MCG/DAY IUD 1 each by Intrauterine route once     • spironolactone (ALDACTONE) 25 mg tablet Take 1 tablet (25 mg total) by mouth 2 (two) times a day 180 tablet 1   • venlafaxine (EFFEXOR-XR) 75 mg 24 hr capsule Take 1 capsule (75 mg total) by mouth daily Do not start before February 7, 2023  90 capsule 1     No current facility-administered medications for this visit  No Known Allergies    Review of Systems    Video Exam    There were no vitals filed for this visit      Physical Exam

## 2023-03-22 LAB — 17OHP SERPL-MCNC: 35 NG/DL

## 2023-04-07 ENCOUNTER — TELEMEDICINE (OUTPATIENT)
Dept: BEHAVIORAL/MENTAL HEALTH CLINIC | Facility: CLINIC | Age: 23
End: 2023-04-07

## 2023-04-07 DIAGNOSIS — F41.1 GENERALIZED ANXIETY DISORDER: ICD-10-CM

## 2023-04-07 DIAGNOSIS — F32.1 CURRENT MODERATE EPISODE OF MAJOR DEPRESSIVE DISORDER WITHOUT PRIOR EPISODE (HCC): Primary | ICD-10-CM

## 2023-04-07 NOTE — PSYCH
"Behavioral Health Psychotherapy Progress Note    Psychotherapy Provided: Individual Psychotherapy     1  Current moderate episode of major depressive disorder without prior episode ([address] )        2  Generalized anxiety disorder            Goals addressed in session: goal 1, want to balance out my time with work, school and time for myself and not feel guilty for taking time with myself  DATA:  MSW met with Brenda  for session  She applied for and got a new job  It is a  for board games  \"She loves board games and they are a big part of her life  \"  They had another job also posted that she would love to get that she never thought of but, it would be using her degree that she is going to college for  She will now have a foot in the door for a job like that there  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  She also received a scholarship for $580 off next years tuition  \"The bad news\" is issues with her friends  \"She doesn't have any friends now  \"  Discussed her friend naif Gutierrez is not a drama person  Discussed what happens as we progress through life  During this session, this clinician used the following therapeutic modalities: Client-centered Therapy, Cognitive Behavioral Therapy, Cognitive Processing Therapy, Solution-Focused Therapy and Supportive Psychotherapy    Substance Abuse was not addressed during this session  If the client is diagnosed with a co-occurring substance use disorder, please indicate any changes in the frequency or amount of use: na  Stage of change for addressing substance use diagnoses: No substance use/Not applicable    ASSESSMENT:  Lizy Yin presents with a Euthymic/ normal mood  her affect is Normal range and intensity, which is congruent, with her mood and the content of the session  The client has made progress on their goals       Lizy Yin presents with a none risk of suicide, none risk of self-harm, and none risk " "of harm to others  For any risk assessment that surpasses a \"low\" rating, a safety plan must be developed  A safety plan was indicated: no  If yes, describe in detail     PLAN: Between sessions, Brenda Lopez will begin addressing new tx plan goals  At the next session, the therapist will use Client-centered Therapy, Cognitive Behavioral Therapy, Cognitive Processing Therapy, Solution-Focused Therapy and Supportive Psychotherapy to address tx plan goals and new issues that arise since last session       Behavioral Health Treatment Plan and Discharge Planning: Soledad Perez is aware of and agrees to continue to work on their treatment plan  They have identified and are working toward their discharge goals  yes    Visit start and stop times:    04/07/23  Start Time: 1712  Stop Time: 1800  Total Visit Time: 48 minutes    Virtual Regular Visit    Verification of patient location:    Patient is located in the following state in which I hold an active license [place]      Assessment/Plan:    Problem List Items Addressed This Visit    None      Goals addressed in session: Goal 1          Reason for visit is   No chief complaint on file  Encounter provider Skylar Hull    Provider located at [address] [place] [id number] [phone]      Recent Visits  No visits were found meeting these conditions  Showing recent visits within past 7 days and meeting all other requirements  Future Appointments  No visits were found meeting these conditions  Showing future appointments within next 150 days and meeting all other requirements       The patient was identified by name and date of birth  Soledad Perez was informed that this is a telemedicine visit and that the visit is being conducted throughthe [employer] platform  She agrees to proceed     My office door was closed  No one else was in the room    She " acknowledged consent and understanding of privacy and security of the video platform  The patient has agreed to participate and understands they can discontinue the visit at any time  Patient is aware this is a billable service  Subjective  Brenda Spangler is a 25 y o  female    HPI     Past Medical History:   Diagnosis Date   • Anxiety    • Chronic vaginitis 3/15/2017   • Concussion     Last Assessed: 11/15/2017   • Depression        Past Surgical History:   Procedure Laterality Date   • APPENDECTOMY     • INSERTION OF INTRAUTERINE DEVICE (IUD) N/A        Current Outpatient Medications   Medication Sig Dispense Refill   • adapalene (DIFFERIN) 0 1 % cream Apply topically daily at bedtime 45 g 3   • albuterol (PROVENTIL HFA,VENTOLIN HFA) 90 mcg/act inhaler Inhale 1-2 puffs  0   • Azelaic Acid 15 % cream Apply 1 application topically 2 (two) times a day Apply topically twice a day to face 30 g 0   • clindamycin (CLEOCIN T) 1 % external solution Apply topically 2 (two) times a day 30 mL 0   • dicyclomine (BENTYL) 20 mg tablet Take 1 tablet (20 mg total) by mouth every 6 (six) hours 60 tablet 0   • doxycycline monohydrate (MONODOX) 100 mg capsule Take 100 mg by mouth 2 (two) times a day     • ergocalciferol (VITAMIN D2) 50,000 units Take 1 capsule (50,000 Units total) by mouth once a week 8 capsule 0   • hydrOXYzine HCL (ATARAX) 25 mg tablet Take 25 mg by mouth every 8 (eight) hours as needed     • Levonorgestrel (MIRENA) 20 MCG/DAY IUD 1 each by Intrauterine route once     • spironolactone (ALDACTONE) 25 mg tablet Take 1 tablet (25 mg total) by mouth 2 (two) times a day 180 tablet 1   • venlafaxine (EFFEXOR-XR) 75 mg 24 hr capsule Take 1 capsule (75 mg total) by mouth daily Do not start before February 7, 2023  90 capsule 1     No current facility-administered medications for this visit  No Known Allergies    Review of Systems    Video Exam    There were no vitals filed for this visit      Physical Exam

## 2023-05-02 ENCOUNTER — TELEMEDICINE (OUTPATIENT)
Dept: BEHAVIORAL/MENTAL HEALTH CLINIC | Facility: CLINIC | Age: 23
End: 2023-05-02

## 2023-05-02 DIAGNOSIS — F41.1 GENERALIZED ANXIETY DISORDER: ICD-10-CM

## 2023-05-02 DIAGNOSIS — F32.1 CURRENT MODERATE EPISODE OF MAJOR DEPRESSIVE DISORDER WITHOUT PRIOR EPISODE (HCC): Primary | ICD-10-CM

## 2023-05-02 NOTE — BH CRISIS PLAN
Client Name: Mario Zaragoza       Client YOB: 2000  : 2000    Treatment Team (include name and contact information):     Psychotherapist: Irina Downs LCSW    Psychiatrist: Dr William Avina of information completed: [facility] Provider  MAYRA Paz  [facility]  [phone]    Type of Plan   * Child plans (children 15 yo and younger) must be completed and signed by the child's legal guardian   * Plans for all individuals 15 yo and above must be signed by the client       Plan Type: adolescent/adult (15 and over) Initial      My Personal Strengths are (in the client's own words):  Hard working, kind    The stressors and triggers that may put me at risk are:  loneliness, feeling a lack of control and other (describe) hostilty towards her    Coping skills I can use to keep myself calm and safe:  Call a friend or family member and Increased contact with professional supports    Coping skills/supports I can use to maintain abstinence from substance use:   na    The people that provide me with help and support: (Include name, contact, and how they can help)   Support person #1: dad and mom     * Phone number: has #    * How can they help me? talking   Support person #2:friend Yoko Field    * Phone number: has #    * How can they help me? talking     Support person #3: boyfriend Hernan Baxter    * Phone number: live together    * How can they help me? talking    In the past, the following has helped me in times of crisis:    Being with other people, Taking medications, Calling a friend and Calling a family member      If it is an emergency and you need immediate help, call     If there is a possibility of danger to yourself or others, call the following crisis hotline resources:     Adult Crisis Numbers  Suicide Prevention Hotline - Dial   [facility]: [address]: [address]: [phone]  [facility] [place]: [phone]  [address] ([facility]): [phone]  [facility]: [address] [place] [address] Avenue: [address] [place] [address] St: [phone] (daytime)  [phone] (after hours, weekends, holidays)     Child/Adolescent Crisis Numbers   [facility]: [address]: [phone]   [place]: [phone]   [address] ([facility]): [phone]    Please note: Some counties do not have a separate number for Child/Adolescent specific crisis  If your county is not listed under Child/Adolescent, please call the adult number for your county     National Talk to Text Line   [place] - [phone]    In the event your feelings become unmanageable, and you cannot reach your support system, you will call 911 immediately or go to the nearest hospital emergency room

## 2023-05-02 NOTE — PSYCH
"Behavioral Health Psychotherapy Progress Note    Psychotherapy Provided: Individual Psychotherapy     1  Current moderate episode of major depressive disorder without prior episode ([address] )        2  Generalized anxiety disorder            Goals addressed in session: goal 1, want to balance out my time with work, school and time for myself and not feel guilty for taking time with myself  DATA:  MSW met with Brenda  for session  School almost done  Work is going ok  Her \"problem is her relationship  \"  They have been together for 5 and a half years  Discussed the problems  MSW offered couples counseling  Next session she will invite her boyfriend  MSW will have MR send TIFFANIE for her to sign  Completed crisis plan  During this session, this clinician used the following therapeutic modalities: Client-centered Therapy, Cognitive Behavioral Therapy, Cognitive Processing Therapy, Solution-Focused Therapy and Supportive Psychotherapy    Substance Abuse was not addressed during this session  If the client is diagnosed with a co-occurring substance use disorder, please indicate any changes in the frequency or amount of use: na  Stage of change for addressing substance use diagnoses: No substance use/Not applicable    ASSESSMENT:  Nataliia López presents with a Euthymic/ normal mood  her affect is Normal range and intensity, which is congruent, with her mood and the content of the session  The client has made progress on their goals  Nataliia López presents with a none risk of suicide, none risk of self-harm, and none risk of harm to others  For any risk assessment that surpasses a \"low\" rating, a safety plan must be developed  A safety plan was indicated: no  If yes, describe in detail     PLAN: Between sessions, Brenda Tompkins will begin addressing new tx plan goals   At the next session, the therapist will use Client-centered Therapy, Cognitive Behavioral Therapy, Cognitive Processing " Therapy, Solution-Focused Therapy and Supportive Psychotherapy to address tx plan goals and new issues that arise since last session       Behavioral Health Treatment Plan and Discharge Planning: Tran Mccallum is aware of and agrees to continue to work on their treatment plan  They have identified and are working toward their discharge goals  yes    Visit start and stop times:    05/02/23  Start Time: 1612  Stop Time: 1705  Total Visit Time: 53 minutes    Virtual Regular Visit    Verification of patient location:    Patient is located at Home in the following state in which I hold an active license [place]      Assessment/Plan:    Problem List Items Addressed This Visit    None      Goals addressed in session: Goal 1          Reason for visit is   No chief complaint on file  Encounter provider Cara Burch    Provider located at [address] [place] [id number] [phone]      Recent Visits  No visits were found meeting these conditions  Showing recent visits within past 7 days and meeting all other requirements  Today's Visits  Date Type Provider Dept   05/02/23 [facility] today's visits and meeting all other requirements  Future Appointments  No visits were found meeting these conditions  Showing future appointments within next 150 days and meeting all other requirements       The patient was identified by name and date of birth  Tran Mccallum was informed that this is a telemedicine visit and that the visit is being conducted throughthe [employer] platform  She agrees to proceed     My office door was closed  No one else was in the room  She acknowledged consent and understanding of privacy and security of the video platform  The patient has agreed to participate and understands they can discontinue the visit at any time      Patient is aware this is a billable service  Subjective  Brenda Pedersen is a 25 y o  female    HPI     Past Medical History:   Diagnosis Date    Anxiety     Chronic vaginitis 3/15/2017    Concussion     Last Assessed: 11/15/2017    Depression        Past Surgical History:   Procedure Laterality Date    APPENDECTOMY      INSERTION OF INTRAUTERINE DEVICE (IUD) N/A        Current Outpatient Medications   Medication Sig Dispense Refill    adapalene (DIFFERIN) 0 1 % cream Apply topically daily at bedtime 45 g 3    albuterol (PROVENTIL HFA,VENTOLIN HFA) 90 mcg/act inhaler Inhale 1-2 puffs  0    Azelaic Acid 15 % cream Apply 1 application topically 2 (two) times a day Apply topically twice a day to face 30 g 0    clindamycin (CLEOCIN T) 1 % external solution Apply topically 2 (two) times a day 30 mL 0    dicyclomine (BENTYL) 20 mg tablet Take 1 tablet (20 mg total) by mouth every 6 (six) hours (Patient not taking: Reported on 4/19/2023) 60 tablet 0    doxycycline monohydrate (MONODOX) 100 mg capsule Take 1 capsule (100 mg total) by mouth 2 (two) times a day 120 capsule 0    ergocalciferol (VITAMIN D2) 50,000 units Take 1 capsule (50,000 Units total) by mouth once a week 8 capsule 0    hydrOXYzine HCL (ATARAX) 25 mg tablet Take 1 tablet (25 mg total) by mouth daily as needed (moderate-severe anxiety) 30 tablet 1    Levonorgestrel (MIRENA) 20 MCG/DAY IUD 1 each by Intrauterine route once      spironolactone (ALDACTONE) 50 mg tablet Take 1 tablet (50 mg total) by mouth 2 (two) times a day 180 tablet 2    venlafaxine (EFFEXOR-XR) 75 mg 24 hr capsule Take 1 capsule (75 mg total) by mouth daily 90 capsule 1     No current facility-administered medications for this visit  No Known Allergies    Review of Systems    Video Exam    There were no vitals filed for this visit      Physical Exam

## 2023-05-03 ENCOUNTER — ANNUAL EXAM (OUTPATIENT)
Dept: OBGYN CLINIC | Facility: CLINIC | Age: 23
End: 2023-05-03

## 2023-05-03 VITALS
HEIGHT: 63 IN | WEIGHT: 131 LBS | BODY MASS INDEX: 23.21 KG/M2 | DIASTOLIC BLOOD PRESSURE: 84 MMHG | SYSTOLIC BLOOD PRESSURE: 96 MMHG

## 2023-05-03 DIAGNOSIS — Z01.419 ENCOUNTER FOR GYNECOLOGICAL EXAMINATION WITHOUT ABNORMAL FINDING: Primary | ICD-10-CM

## 2023-05-03 DIAGNOSIS — Z30.431 SURVEILLANCE OF INTRAUTERINE CONTRACEPTION: ICD-10-CM

## 2023-05-03 PROBLEM — B35.3 TINEA PEDIS OF BOTH FEET: Status: RESOLVED | Noted: 2022-03-24 | Resolved: 2023-05-03

## 2023-05-03 NOTE — PATIENT INSTRUCTIONS
Breast Self Exam for Women   AMBULATORY CARE:   A breast self-exam (BSE)  is a way to check your breasts for lumps and other changes  Regular BSEs can help you know how your breasts normally look and feel  Most breast lumps or changes are not cancer, but you should always have them checked by a healthcare provider  Why you should do a BSE:  Breast cancer is the most common type of cancer in women  Even if you have mammograms, you may still want to do a BSE regularly  If you know how your breasts normally feel and look, it may help you know when to contact your healthcare provider  Mammograms can miss some cancers  You may find a lump during a BSE that did not show up on a mammogram   When you should do a BSE:  If you have periods, you may want to do your BSE 1 week after your period ends  This is the time when your breasts may be the least swollen, lumpy, or tender  You can do regular BSEs even if you are breastfeeding or have breast implants  Call your doctor if:   You find any lumps or changes in your breasts  You have breast pain or fluid coming from your nipples  You have questions or concerns about your condition or care  How to do a BSE:       Look at your breasts in a mirror  Look at the size and shape of each breast and nipple  Check for swelling, lumps, dimpling, scaly skin, or other skin changes  Look for nipple changes, such as a nipple that is painful or beginning to pull inward  Gently squeeze both nipples and check to see if fluid (that is not breast milk) comes out of them  If you find any of these or other breast changes, contact your healthcare provider  Check your breasts while you sit or  the following 3 positions:    [place] your arms down at your sides  Raise your hands and join them behind your head  Put firm pressure with your hands on your hips  Bend slightly forward while you look at your breasts in the mirror  Lie down and feel your breasts    When you lie down, your breast tissue spreads out evenly over your chest  This makes it easier for you to feel for lumps and anything that may not be normal for your breasts  Do a BSE on one breast at a time  Place a small pillow or towel under your left shoulder  Put your left arm behind your head  Use the 3 middle fingers of your right hand  Use your fingertip pads, on the top of your fingers  Your fingertip pad is the most sensitive part of your finger  Use small circles to feel your breast tissue  Use your fingertip pads to make dime-sized, overlapping circles on your breast and armpits  Use light, medium, and firm pressure  First, press lightly  Second, press with medium pressure to feel a little deeper into the breast  Last, use firm pressure to feel deep within your breast     Examine your entire breast area  Examine the breast area from above the breast to below the breast where you feel only ribs  Make small circles with your fingertips, starting in the middle of your armpit  Make circles going up and down the breast area  Continue toward your breast and all the way across it  Examine the area from your armpit all the way over to the middle of your chest (breastbone)  Stop at the middle of your chest     Move the pillow or towel to your right shoulder, and put your right arm behind your head  Use the 3 fingertip pads of your left hand, and repeat the above steps to do a BSE on your right breast   What else you can do to check for breast problems or cancer:  Talk to your healthcare provider about mammograms  A mammogram is an x-ray of your breasts to screen for breast cancer or other problems  Your provider can tell you the benefits and risks of mammograms  The first mammogram is usually at age 39 or 48  Your provider may recommend you start at 36 or younger if your risk for breast cancer is high  Mammograms usually continue every 1 to 2 years until age 76         Follow up with your doctor as directed:  Write down your questions so you remember to ask them during your visits  © Copyright [employer] 2022 Information is for End User's use only and may not be sold, redistributed or otherwise used for commercial purposes  The above information is an  only  It is not intended as medical advice for individual conditions or treatments  Talk to your doctor, nurse or pharmacist before following any medical regimen to see if it is safe and effective for you

## 2023-05-03 NOTE — PROGRESS NOTES
Diagnoses and all orders for this visit:    Encounter for gynecological examination without abnormal finding  -     Liquid-based pap, screening    Surveillance of intrauterine contraception    Calcium/vit d inclusion in the diet discussed, call with any issues, SBE reinforced, all concerns addressed  Pleasant 25 y o  premenopausal female here for annual exam  She denies any issues with bleeding or her menses  She had a Mirena placed 6/2022 and has mostly amenorrhea with it  First pap done today  Denies vaginal issues  Denies pelvic pain/dyspareunia  Denies any issues with her BCM  Sexually active without any concerns  Declines an STD recheck, GC/CT neg 2020, same partner x 6 yrs  Received Gardasils x 3       Past Medical History:   Diagnosis Date    Anxiety     Chronic vaginitis 3/15/2017    Concussion     Last Assessed: 11/15/2017    Depression      Past Surgical History:   Procedure Laterality Date    APPENDECTOMY      INSERTION OF INTRAUTERINE DEVICE (IUD) N/A      Family History   Problem Relation Age of Onset    Restless legs syndrome Mother     Depression Mother     Depression Father     Colon cancer Paternal Grandmother     Uterine cancer Paternal Grandmother     OCD Sister     Anxiety disorder Brother     Hashimoto's thyroiditis Maternal Grandmother     Breast cancer Neg Hx     Ovarian cancer Neg Hx     Cervical cancer Neg Hx      Social History     Tobacco Use    Smoking status: Never    Smokeless tobacco: Never    Tobacco comments:     Vape - ceased use    Vaping Use    Vaping Use: Former    Substances: Nicotine, Flavoring   Substance Use Topics    Alcohol use: Yes     Comment: socially    Drug use: No       Current Outpatient Medications:     adapalene (DIFFERIN) 0 1 % cream, Apply topically daily at bedtime, Disp: 45 g, Rfl: 3    albuterol (PROVENTIL HFA,VENTOLIN HFA) 90 mcg/act inhaler, Inhale 1-2 puffs, Disp: , Rfl: 0    Azelaic Acid 15 % cream, Apply 1 application "topically 2 (two) times a day Apply topically twice a day to face, Disp: 30 g, Rfl: 0    clindamycin (CLEOCIN T) 1 % external solution, Apply topically 2 (two) times a day, Disp: 30 mL, Rfl: 0    dicyclomine (BENTYL) 20 mg tablet, Take 1 tablet (20 mg total) by mouth every 6 (six) hours, Disp: 60 tablet, Rfl: 0    ergocalciferol (VITAMIN D2) 50,000 units, Take 1 capsule (50,000 Units total) by mouth once a week, Disp: 8 capsule, Rfl: 0    hydrOXYzine HCL (ATARAX) 25 mg tablet, Take 1 tablet (25 mg total) by mouth daily as needed (moderate-severe anxiety), Disp: 30 tablet, Rfl: 1    Levonorgestrel (MIRENA) 20 MCG/DAY IUD, 1 each by Intrauterine route once, Disp: , Rfl:     spironolactone (ALDACTONE) 50 mg tablet, Take 1 tablet (50 mg total) by mouth 2 (two) times a day, Disp: 180 tablet, Rfl: 2    venlafaxine (EFFEXOR-XR) 75 mg 24 hr capsule, Take 1 capsule (75 mg total) by mouth daily, Disp: 90 capsule, Rfl: 1  Patient Active Problem List    Diagnosis Date Noted    Tachycardia 2022    Chronic pain of both knees 2020    Canker sores oral 2020    Generalized anxiety disorder 2018    Current moderate episode of major depressive disorder without [facility] 2018    Closed fracture of left side of occipital bone (HCC) 2017    Acne 2016       No Known Allergies    OB History    Para Term  AB Living   0 0 0 0 0 0   SAB IAB Ectopic Multiple Live Births   0 0 0 0 0     ESU for [employer], Senior  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    Vitals:    23 1610   BP: 96/84   Weight: 59 4 kg (131 lb)   Height: 5' 3\" (1 6 m)     Body mass index is 23 21 kg/m²  Review of Systems   Constitutional: Negative for chills, fatigue, fever and unexpected weight change  Respiratory: Negative for shortness of breath  Gastrointestinal: Negative for anal bleeding, blood in stool, constipation and diarrhea     Genitourinary: Negative " for difficulty urinating, dysuria and hematuria  Physical Exam   Constitutional: She appears well-developed and well-nourished  No distress  HENT: atraumatic, EOMI  Head: Normocephalic  Neck: Normal range of motion  Neck supple  Pulmonary: Effort normal   Breasts: bilateral without masses, skin changes or nipple discharge  Bilaterally soft and warm to touch  No areas of erythema or pain  Abdominal: Soft  Pelvic exam was performed with patient supine  No labial fusion  There is no rash, tenderness, lesion or injury on the right labia  There is no rash, tenderness, lesion or injury on the left labia  Urethral meatus does not show any tenderness, inflammation or discharge  Palpation of midline bladder without pain or discomfort  Uterus is not deviated, not enlarged, not fixed and not tender  Cervix exhibits no motion tenderness, no discharge and no friability  Right adnexum displays no mass, no tenderness and no fullness  Left adnexum displays no mass, no tenderness and no fullness  No erythema or tenderness in the vagina  No foreign body in the vagina  No signs of injury around the vagina  NO vaginal discharge found  No signs of injury around the vagina or anus  Perineum without lesions, signs of injury, erythema or swelling  Lymphadenopathy:        Right: No inguinal adenopathy present  Left: No inguinal adenopathy present

## 2023-05-08 ENCOUNTER — TELEPHONE (OUTPATIENT)
Dept: PSYCHIATRY | Facility: CLINIC | Age: 23
End: 2023-05-08

## 2023-05-08 NOTE — TELEPHONE ENCOUNTER
Writer contacted patient, per provider, to explain that the completion of the TIFFANIE would have to be in person  Patient understood and will be coming in to fill paper work out

## 2023-05-09 LAB
LAB AP GYN PRIMARY INTERPRETATION: NORMAL
Lab: NORMAL

## 2023-05-16 NOTE — TELEPHONE ENCOUNTER
Patient called to verify our hours for today 5/16/2023 because she will be coming in to fill out an TIFFANIE, writer confirmed our hours for patient

## 2023-05-18 ENCOUNTER — TELEMEDICINE (OUTPATIENT)
Dept: BEHAVIORAL/MENTAL HEALTH CLINIC | Facility: CLINIC | Age: 23
End: 2023-05-18

## 2023-05-18 DIAGNOSIS — F41.1 GENERALIZED ANXIETY DISORDER: Primary | ICD-10-CM

## 2023-05-18 DIAGNOSIS — F32.1 CURRENT MODERATE EPISODE OF MAJOR DEPRESSIVE DISORDER WITHOUT PRIOR EPISODE (HCC): ICD-10-CM

## 2023-05-18 NOTE — PSYCH
Behavioral Health Psychotherapy Progress Note    Psychotherapy Provided: Family Therapy    1  Generalized anxiety disorder        2  Current moderate episode of major depressive disorder without prior episode ([facility] )            Goals addressed in session: goal 1, want to balance out my time with work, school and time for myself and not feel guilty for taking time with myself  DATA:  MSW met with Brenda  for session  She had her boyfriend join us for session  Discussed how very different they are but, MSW discussed how that can be a positive thing such as they can balance each other out and pull the other one a little in for balance  They do not due much together  They were trying to think of something they have in common that they can do  MSW even suggested eating dinner together, at the table  with the tv off  Also discussed the household chores  MSW suggested Kellogg Forget obtain a therapist of his own  He appears to have some depression which is interfering with their relationship  He has no insurance  MSW suggested he check to see about EAP services through work and or into medical assistance since he income is low  When we began session he appeared very stand offish  By the end of therapy he was willing to see MSW without pt for a session or two if needed  During this session, this clinician used the following therapeutic modalities: Client-centered Therapy, Cognitive Behavioral Therapy, Cognitive Processing Therapy, Solution-Focused Therapy and Supportive Psychotherapy    Substance Abuse was not addressed during this session  If the client is diagnosed with a co-occurring substance use disorder, please indicate any changes in the frequency or amount of use: na  Stage of change for addressing substance use diagnoses: No substance use/Not applicable    ASSESSMENT:  Susan Farnsworth presents with a Euthymic/ normal mood       her affect is Normal range and intensity, which is congruent, with her mood and "the content of the session  The client has made progress on their goals  Danielle Robison presents with a none risk of suicide, none risk of self-harm, and none risk of harm to others  For any risk assessment that surpasses a \"low\" rating, a safety plan must be developed  A safety plan was indicated: no  If yes, describe in detail     PLAN: Between sessions, Brenda Mercedes will begin addressing new tx plan goals  At the next session, the therapist will use Client-centered Therapy, Cognitive Behavioral Therapy, Cognitive Processing Therapy, Solution-Focused Therapy and Supportive Psychotherapy to address tx plan goals and new issues that arise since last session       Behavioral Health Treatment Plan and Discharge Planning: Danielle Robison is aware of and agrees to continue to work on their treatment plan  They have identified and are working toward their discharge goals  yes    Visit start and stop times:    05/18/23  Start Time: 1712  Stop Time: 1800  Total Visit Time: 48 minutes    Virtual Regular Visit    Verification of patient location:    Patient is located at Home in the following state in which I hold an active license [place]      Assessment/Plan:    Problem List Items Addressed This Visit    None      Goals addressed in session: Goal 1          Reason for visit is   No chief complaint on file  Encounter provider Lisa Segovia    Provider located at [address] [place] [id number] [phone]      Recent Visits  No visits were found meeting these conditions  Showing recent visits within past 7 days and meeting all other requirements  Today's Visits  Date Type Provider Dept   05/18/23 [address] [place] [address]  today's visits and meeting all other requirements  Future Appointments  No visits were found meeting these conditions    Showing future " appointments within next 150 days and meeting all other requirements       The patient was identified by name and date of birth  Rosa Maria Qiu was informed that this is a telemedicine visit and that the visit is being conducted throughthe [employer] platform  She agrees to proceed     My office door was closed  No one else was in the room  She acknowledged consent and understanding of privacy and security of the video platform  The patient has agreed to participate and understands they can discontinue the visit at any time  Patient is aware this is a billable service  Subjective  Brenda Avalos is a 25 y o  female          HPI     Past Medical History:   Diagnosis Date   • Anxiety    • Chronic vaginitis 3/15/2017   • Concussion     Last Assessed: 11/15/2017   • Depression        Past Surgical History:   Procedure Laterality Date   • APPENDECTOMY     • INSERTION OF INTRAUTERINE DEVICE (IUD) N/A        Current Outpatient Medications   Medication Sig Dispense Refill   • adapalene (DIFFERIN) 0 1 % cream Apply topically daily at bedtime 45 g 3   • albuterol (PROVENTIL HFA,VENTOLIN HFA) 90 mcg/act inhaler Inhale 1-2 puffs  0   • Azelaic Acid 15 % cream Apply 1 application topically 2 (two) times a day Apply topically twice a day to face 30 g 0   • clindamycin (CLEOCIN T) 1 % external solution Apply topically 2 (two) times a day 30 mL 0   • dicyclomine (BENTYL) 20 mg tablet Take 1 tablet (20 mg total) by mouth every 6 (six) hours 60 tablet 0   • ergocalciferol (VITAMIN D2) 50,000 units Take 1 capsule (50,000 Units total) by mouth once a week 8 capsule 0   • hydrOXYzine HCL (ATARAX) 25 mg tablet Take 1 tablet (25 mg total) by mouth daily as needed (moderate-severe anxiety) 30 tablet 1   • Levonorgestrel (MIRENA) 20 MCG/DAY IUD 1 each by Intrauterine route once     • spironolactone (ALDACTONE) 50 mg tablet Take 1 tablet (50 mg total) by mouth 2 (two) times a day 180 tablet 2   • venlafaxine (EFFEXOR-XR) 75 mg 24 hr capsule Take 1 capsule (75 mg total) by mouth daily 90 capsule 1     No current facility-administered medications for this visit  No Known Allergies    Review of Systems    Video Exam    There were no vitals filed for this visit      Physical Exam

## 2023-05-30 ENCOUNTER — TELEMEDICINE (OUTPATIENT)
Dept: BEHAVIORAL/MENTAL HEALTH CLINIC | Facility: CLINIC | Age: 23
End: 2023-05-30

## 2023-05-30 DIAGNOSIS — F41.1 GENERALIZED ANXIETY DISORDER: ICD-10-CM

## 2023-05-30 DIAGNOSIS — F32.1 CURRENT MODERATE EPISODE OF MAJOR DEPRESSIVE DISORDER WITHOUT PRIOR EPISODE (HCC): Primary | ICD-10-CM

## 2023-05-30 NOTE — PSYCH
"Behavioral Health Psychotherapy Progress Note    Psychotherapy Provided: Family Therapy    1  Current moderate episode of major depressive disorder without prior episode ([address] )        2  Generalized anxiety disorder            Goals addressed in session: goal 1, want to balance out my time with work, school and time for myself and not feel guilty for taking time with myself  DATA:  MSW met with Brenda  for session  Since last session her \"boyfriend has been trying  \"  He helps out more around the house and is now taking a certificate course for something he is interested in doing as a career  She did the leg work and helped him find the program   She is happy with the progress he has made so far  She discussed his lack of motivation, lack of interest in things, his not doing anything, etc   MSW asked if he smoked marijuana since how she described how he is  The answer was Safia Speaker, he smokes a lot  She has tried talking to him about it but, he is very resistant regarding it  \"  He also has ADHD which has the same symptoms as smoking marijuana  During this session, this clinician used the following therapeutic modalities: Client-centered Therapy, Cognitive Behavioral Therapy, Cognitive Processing Therapy, Solution-Focused Therapy and Supportive Psychotherapy    Substance Abuse was not addressed during this session  If the client is diagnosed with a co-occurring substance use disorder, please indicate any changes in the frequency or amount of use: na  Stage of change for addressing substance use diagnoses: No substance use/Not applicable    ASSESSMENT:  Alicia Torrez presents with a Euthymic/ normal mood  her affect is Normal range and intensity, which is congruent, with her mood and the content of the session  The client has made progress on their goals  Alicia Torrez presents with a none risk of suicide, none risk of self-harm, and none risk of harm to others      For any risk assessment that " "surpasses a \"low\" rating, a safety plan must be developed  A safety plan was indicated: no  If yes, describe in detail     PLAN: Between sessions, Brenda Zambrano will begin addressing new tx plan goals  At the next session, the therapist will use Client-centered Therapy, Cognitive Behavioral Therapy, Cognitive Processing Therapy, Solution-Focused Therapy and Supportive Psychotherapy to address tx plan goals and new issues that arise since last session       Behavioral Health Treatment Plan and Discharge Planning: Thien Burgos is aware of and agrees to continue to work on their treatment plan  They have identified and are working toward their discharge goals  yes    Visit start and stop times:    05/30/23  Start Time: 0812  Stop Time: 0900  Total Visit Time: 48 minutes    Virtual Regular Visit    Verification of patient location:    Patient is located at Home in the following state in which I hold an active license [place]      Assessment/Plan:    Problem List Items Addressed This Visit    None      Goals addressed in session: Goal 1          Reason for visit is   No chief complaint on file  Encounter provider Araceli Pettit    Provider located at [address] [place] [id number] [phone]      Recent Visits  No visits were found meeting these conditions  Showing recent visits within past 7 days and meeting all other requirements  Future Appointments  No visits were found meeting these conditions  Showing future appointments within next 150 days and meeting all other requirements       The patient was identified by name and date of birth  Thien Burgos was informed that this is a telemedicine visit and that the visit is being conducted throughthe [employer] platform  She agrees to proceed     My office door was closed  No one else was in the room    She acknowledged consent and understanding of privacy and " security of the video platform  The patient has agreed to participate and understands they can discontinue the visit at any time  Patient is aware this is a billable service  Subjective  Brenda Matias is a 25 y o  female    HPI     Past Medical History:   Diagnosis Date   • Anxiety    • Chronic vaginitis 3/15/2017   • Concussion     Last Assessed: 11/15/2017   • Depression        Past Surgical History:   Procedure Laterality Date   • APPENDECTOMY     • INSERTION OF INTRAUTERINE DEVICE (IUD) N/A        Current Outpatient Medications   Medication Sig Dispense Refill   • adapalene (DIFFERIN) 0 1 % cream Apply topically daily at bedtime 45 g 3   • albuterol (PROVENTIL HFA,VENTOLIN HFA) 90 mcg/act inhaler Inhale 1-2 puffs  0   • Azelaic Acid 15 % cream Apply 1 application topically 2 (two) times a day Apply topically twice a day to face 30 g 0   • clindamycin (CLEOCIN T) 1 % external solution Apply topically 2 (two) times a day 30 mL 0   • dicyclomine (BENTYL) 20 mg tablet Take 1 tablet (20 mg total) by mouth every 6 (six) hours 60 tablet 0   • ergocalciferol (VITAMIN D2) 50,000 units Take 1 capsule (50,000 Units total) by mouth once a week 8 capsule 0   • hydrOXYzine HCL (ATARAX) 25 mg tablet Take 1 tablet (25 mg total) by mouth daily as needed (moderate-severe anxiety) 30 tablet 1   • Levonorgestrel (MIRENA) 20 MCG/DAY IUD 1 each by Intrauterine route once     • spironolactone (ALDACTONE) 50 mg tablet Take 1 tablet (50 mg total) by mouth 2 (two) times a day 180 tablet 2   • venlafaxine (EFFEXOR-XR) 75 mg 24 hr capsule Take 1 capsule (75 mg total) by mouth daily 90 capsule 1     No current facility-administered medications for this visit  No Known Allergies    Review of Systems    Video Exam    There were no vitals filed for this visit      Physical Exam

## 2023-06-06 ENCOUNTER — OFFICE VISIT (OUTPATIENT)
Dept: FAMILY MEDICINE CLINIC | Facility: CLINIC | Age: 23
End: 2023-06-06
Payer: COMMERCIAL

## 2023-06-06 VITALS
HEIGHT: 63 IN | WEIGHT: 130.8 LBS | TEMPERATURE: 98.9 F | HEART RATE: 112 BPM | OXYGEN SATURATION: 97 % | SYSTOLIC BLOOD PRESSURE: 110 MMHG | BODY MASS INDEX: 23.18 KG/M2 | DIASTOLIC BLOOD PRESSURE: 76 MMHG

## 2023-06-06 DIAGNOSIS — Z00.00 ANNUAL PHYSICAL EXAM: Primary | ICD-10-CM

## 2023-06-06 DIAGNOSIS — Z13.6 SCREENING FOR CARDIOVASCULAR CONDITION: ICD-10-CM

## 2023-06-06 DIAGNOSIS — D22.9 ATYPICAL MOLE: ICD-10-CM

## 2023-06-06 DIAGNOSIS — E55.9 VITAMIN D DEFICIENCY: ICD-10-CM

## 2023-06-06 PROCEDURE — 99385 PREV VISIT NEW AGE 18-39: CPT

## 2023-06-06 PROCEDURE — 88341 IMHCHEM/IMCYTCHM EA ADD ANTB: CPT | Performed by: PATHOLOGY

## 2023-06-06 PROCEDURE — 88305 TISSUE EXAM BY PATHOLOGIST: CPT | Performed by: PATHOLOGY

## 2023-06-06 PROCEDURE — 88342 IMHCHEM/IMCYTCHM 1ST ANTB: CPT | Performed by: PATHOLOGY

## 2023-06-06 PROCEDURE — 11102 TANGNTL BX SKIN SINGLE LES: CPT

## 2023-06-06 RX ORDER — ERGOCALCIFEROL 1.25 MG/1
50000 CAPSULE ORAL 2 TIMES WEEKLY
Qty: 24 CAPSULE | Refills: 0 | Status: SHIPPED | OUTPATIENT
Start: 2023-06-08

## 2023-06-06 NOTE — PROGRESS NOTES
[facility] [address] [facility]    NAME: Amalia Oconnell  AGE: 25 y o  SEX: female  : 2000     DATE: 2023     Assessment and Plan:     Problem List Items Addressed This Visit        Other    Vitamin D deficiency     Start twice weekly D2 and daily Vitamin D3 5000 u, have lab work and follow up in 6 months  Relevant Medications    ergocalciferol (VITAMIN D2) 50,000 units (Start on 2023)    Other Relevant Orders    Vitamin D 25 hydroxy   Other Visit Diagnoses     Annual physical exam    -  Primary    Suggest 3-5 fruits and vegetables and increase exercise to 20 minutes most days of the week  Screening for cardiovascular condition        Have lab work in 6 months and follow up    Relevant Orders    Lipid panel    Hemoglobin A1C    Comprehensive metabolic panel    TSH, 3rd generation with Free T4 reflex    Atypical mole        removed today, will call with results of pathology  Relevant Orders    Biopsy (Completed)    Tissue Exam          Immunizations and preventive care screenings were discussed with patient today  Appropriate education was printed on patient's after visit summary  Counseling:  Alcohol/drug use: discussed moderation in alcohol intake, the recommendations for healthy alcohol use, and avoidance of illicit drug use  Dental Health: discussed importance of regular tooth brushing, flossing, and dental visits  Injury prevention: discussed safety/seat belts, safety helmets, smoke detectors, carbon dioxide detectors, and smoking near bedding or upholstery  Sexual health: discussed sexually transmitted diseases, partner selection, use of condoms, avoidance of unintended pregnancy, and contraceptive alternatives  Exercise: the importance of regular exercise/physical activity was discussed  Recommend exercise 3-5 times per week for at least 30 minutes            Return in 6 months (on 12/6/2023)  Chief Complaint:     Chief Complaint   Patient presents with   • Follow-up   • Skin Irritation     Mole on left shoulder changing colors/size  Been there for years, looked the same for years  History of Present Illness:     Adult Annual Physical   Patient here for a comprehensive physical exam  The patient reports problems - atypical mole  Diet and Physical Activity  Diet/Nutrition: limited fruits/vegetables  Exercise: no formal exercise  Depression Screening  PHQ-2/9 Depression Screening         General Health  Sleep: sleeps well and gets more than 8 hours of sleep on average  Hearing: normal - bilateral   Vision: no vision problems and most recent eye exam >1 year ago  Dental: regular dental visits, brushes teeth twice daily and flosses teeth occasionally  /GYN Health  Last menstrual period: 7/2022  Contraceptive method: IUD placement  History of STDs?: no      Review of Systems:     Review of Systems   Constitutional: Negative for chills, diaphoresis, fatigue and fever  HENT: Negative for congestion, ear pain, postnasal drip, rhinorrhea, sinus pain and sore throat  Eyes: Negative for pain and visual disturbance  Respiratory: Negative for cough, chest tightness, shortness of breath and wheezing  Cardiovascular: Negative for chest pain and palpitations  Gastrointestinal: Positive for constipation  Negative for abdominal pain, diarrhea, nausea and vomiting  Genitourinary: Negative for dysuria, frequency, hematuria and urgency  Musculoskeletal: Negative for arthralgias, back pain and myalgias  Skin: Negative for color change and rash  Neurological: Negative for dizziness, seizures, syncope, light-headedness and headaches  Psychiatric/Behavioral: Negative for dysphoric mood and sleep disturbance  The patient is not nervous/anxious  All other systems reviewed and are negative       Past Medical History:     Past Medical History:   Diagnosis Date   • Anxiety    • Chronic vaginitis 3/15/2017   • Concussion     Last Assessed: 11/15/2017   • Depression       Past Surgical History:     Past Surgical History:   Procedure Laterality Date   • APPENDECTOMY     • INSERTION OF INTRAUTERINE DEVICE (IUD) N/A       Social History:     Social History     Socioeconomic History   • Marital status: Single     Spouse name: None   • Number of children: None   • Years of education: None   • Highest education level: None   Occupational History   • None   Tobacco Use   • Smoking status: Never   • Smokeless tobacco: Never   • Tobacco comments:     Vape - ceased use    Vaping Use   • Vaping Use: Former   • Substances: Nicotine, Flavoring   Substance and Sexual Activity   • Alcohol use: Yes     Comment: socially   • Drug use: No   • Sexual activity: Yes     Partners: Male     Birth control/protection: OCP   Other Topics Concern   • None   Social History Narrative    Immunization status: up to date and documented       Social Determinants of Health     Financial Resource Strain: Not on file   Food Insecurity: Not on file   Transportation Needs: Not on file   Physical Activity: Not on file   Stress: Not on file   Social Connections: Not on file   Intimate Partner Violence: Not on file   Housing Stability: Not on file      Family History:     Family History   Problem Relation Age of Onset   • Restless legs syndrome Mother    • Depression Mother    • Depression Father    • Colon cancer Paternal Grandmother    • Uterine cancer Paternal Grandmother    • OCD Sister    • Anxiety disorder Brother    • Hashimoto's thyroiditis Maternal Grandmother    • Breast cancer Neg Hx    • Ovarian cancer Neg Hx    • Cervical cancer Neg Hx       Current Medications:     Current Outpatient Medications   Medication Sig Dispense Refill   • albuterol (PROVENTIL HFA,VENTOLIN HFA) 90 mcg/act inhaler Inhale 1-2 puffs  0   • clindamycin (CLEOCIN T) 1 % external solution Apply topically 2 (two) times a day 30 mL 0   • "dicyclomine (BENTYL) 20 mg tablet Take 1 tablet (20 mg total) by mouth every 6 (six) hours 60 tablet 0   • [START ON 6/8/2023] ergocalciferol (VITAMIN D2) 50,000 units Take 1 capsule (50,000 Units total) by mouth 2 (two) times a week 24 capsule 0   • hydrOXYzine HCL (ATARAX) 25 mg tablet Take 1 tablet (25 mg total) by mouth daily as needed (moderate-severe anxiety) 30 tablet 1   • Levonorgestrel (MIRENA) 20 MCG/DAY IUD 1 each by Intrauterine route once     • spironolactone (ALDACTONE) 50 mg tablet Take 1 tablet (50 mg total) by mouth 2 (two) times a day 180 tablet 2   • venlafaxine (EFFEXOR-XR) 75 mg 24 hr capsule Take 1 capsule (75 mg total) by mouth daily 90 capsule 1     No current facility-administered medications for this visit  Allergies:     No Known Allergies   Physical Exam:     /76 (BP Location: Left arm, Patient Position: Sitting, Cuff Size: Standard)   Pulse (!) 112   Temp 98 9 °F (37 2 °C) (Tympanic)   Ht 5' 3\" (1 6 m)   Wt 59 3 kg (130 lb 12 8 oz)   SpO2 97%   BMI 23 17 kg/m²     Physical Exam  Vitals and nursing note reviewed  Constitutional:       General: She is not in acute distress  Appearance: Normal appearance  She is well-developed  She is not ill-appearing  HENT:      Head: Normocephalic and atraumatic  Right Ear: Tympanic membrane, ear canal and external ear normal  There is no impacted cerumen  Left Ear: Tympanic membrane, ear canal and external ear normal  There is no impacted cerumen  Nose: Nose normal  No congestion or rhinorrhea  Mouth/Throat:      Mouth: Mucous membranes are moist       Pharynx: No posterior oropharyngeal erythema  Eyes:      Extraocular Movements: Extraocular movements intact  Conjunctiva/sclera: Conjunctivae normal       Pupils: Pupils are equal, round, and reactive to light  Cardiovascular:      Rate and Rhythm: Normal rate and regular rhythm  Pulses: Normal pulses  Heart sounds: Normal heart sounds   No " "murmur heard  Pulmonary:      Effort: Pulmonary effort is normal  No respiratory distress  Breath sounds: Normal breath sounds  Abdominal:      General: Bowel sounds are normal  There is no distension  Palpations: Abdomen is soft  Tenderness: There is no abdominal tenderness  Musculoskeletal:         General: No swelling or tenderness  Normal range of motion  Cervical back: Normal range of motion  No tenderness  Right lower leg: No edema  Left lower leg: No edema  Lymphadenopathy:      Cervical: No cervical adenopathy  Skin:     General: Skin is warm and dry  Capillary Refill: Capillary refill takes less than 2 seconds  Neurological:      General: No focal deficit present  Mental Status: She is alert and oriented to person, place, and time  Psychiatric:         Mood and Affect: Mood normal          Behavior: Behavior normal          Thought Content: Thought content normal          Judgment: Judgment normal             MAYRA Ty   [address]   Biopsy    Date/Time: 6/6/2023 2:20 PM    Performed by: MAYRA Ty  Authorized by: MAYRA Ty  Universal Protocol:  Consent: Verbal consent obtained  Risks and benefits: risks, benefits and alternatives were discussed  Consent given by: patient  Time out: Immediately prior to procedure a \"time out\" was called to verify the correct patient, procedure, equipment, support staff and site/side marked as required    Timeout called at: 6/6/2023 3:11 PM   Patient understanding: patient states understanding of the procedure being performed  Patient consent: the patient's understanding of the procedure matches consent given  Procedure consent: procedure consent matches procedure scheduled  Required items: required blood products, implants, devices, and special equipment available  Patient identity confirmed: verbally with patient      Procedure Details - " Lesion Biopsy:      Body area:  Upper extremity    Upper extremity location:  L shoulder    Biopsy method: shave biopsy      Biopsy tissue type: skin    Initial size (mm):  50    Final defect size (mm):  0    Malignancy: malignancy unknown

## 2023-06-08 ENCOUNTER — TELEPHONE (OUTPATIENT)
Dept: DERMATOLOGY | Facility: CLINIC | Age: 23
End: 2023-06-08

## 2023-06-08 NOTE — TELEPHONE ENCOUNTER
No they're only giving me 2 assistants on virtual days, can't overbook    Will have to give her next available

## 2023-06-08 NOTE — TELEPHONE ENCOUNTER
Pt left vm about her June virtual appt; currently not scheduled, next available in Aug     Dr Pippa Boyle please advise, can I doublebook a virtual or schedule for next available

## 2023-06-12 PROCEDURE — 88341 IMHCHEM/IMCYTCHM EA ADD ANTB: CPT | Performed by: PATHOLOGY

## 2023-06-12 PROCEDURE — 88305 TISSUE EXAM BY PATHOLOGIST: CPT | Performed by: PATHOLOGY

## 2023-06-12 PROCEDURE — 88342 IMHCHEM/IMCYTCHM 1ST ANTB: CPT | Performed by: PATHOLOGY

## 2023-06-13 ENCOUNTER — TELEPHONE (OUTPATIENT)
Dept: FAMILY MEDICINE CLINIC | Facility: CLINIC | Age: 23
End: 2023-06-13

## 2023-06-16 ENCOUNTER — TELEMEDICINE (OUTPATIENT)
Dept: BEHAVIORAL/MENTAL HEALTH CLINIC | Facility: CLINIC | Age: 23
End: 2023-06-16
Payer: COMMERCIAL

## 2023-06-16 DIAGNOSIS — F41.1 GENERALIZED ANXIETY DISORDER: Primary | ICD-10-CM

## 2023-06-16 DIAGNOSIS — F32.1 CURRENT MODERATE EPISODE OF MAJOR DEPRESSIVE DISORDER WITHOUT PRIOR EPISODE (HCC): ICD-10-CM

## 2023-06-16 PROCEDURE — 90834 PSYTX W PT 45 MINUTES: CPT | Performed by: SOCIAL WORKER

## 2023-06-16 NOTE — PSYCH
Behavioral Health Psychotherapy Progress Note    Psychotherapy Provided: Family Therapy    1. Generalized anxiety disorder        2. Current moderate episode of major depressive disorder without prior episode ([address])            Goals addressed in session: goal 1, want to balance out my time with work, school and time for myself and not feel guilty for taking time with myself. DATA:  MSW met with Brenda  for session. Discussed her job and some issues she is having with her boss. She is being patient with him because he is new but, she is very frustrated with him. She was having issues with her school but, after many emails thinks she has all the issues worked out with them. She has a weeks vacation coming up with her parents and her boyfriend, she gets back home then, leaves the next day for a week long conference with work. She finished up her one summer course. She is on tract to graduate in the fall. Things with boyfriend are "ok."    During this session, this clinician used the following therapeutic modalities: Client-centered Therapy, Cognitive Behavioral Therapy, Cognitive Processing Therapy, Solution-Focused Therapy and Supportive Psychotherapy    Substance Abuse was not addressed during this session. If the client is diagnosed with a co-occurring substance use disorder, please indicate any changes in the frequency or amount of use: na. Stage of change for addressing substance use diagnoses: No substance use/Not applicable    ASSESSMENT:  Bobo Lantigua presents with a Euthymic/ normal mood. her affect is Normal range and intensity, which is congruent, with her mood and the content of the session. The client has made progress on their goals. Bobo Lantigua presents with a none risk of suicide, none risk of self-harm, and none risk of harm to others. For any risk assessment that surpasses a "low" rating, a safety plan must be developed.     A safety plan was indicated: no  If yes, describe in detail     PLAN: Between sessions, Ad Smith will begin addressing new tx plan goals. At the next session, the therapist will use Client-centered Therapy, Cognitive Behavioral Therapy, Cognitive Processing Therapy, Solution-Focused Therapy and Supportive Psychotherapy to address tx plan goals and new issues that arise since last session. .    Behavioral Health Treatment Plan and Discharge Planning: Ad Smith is aware of and agrees to continue to work on their treatment plan. They have identified and are working toward their discharge goals. yes    Visit start and stop times:    06/16/23  Start Time: 0812  Stop Time: 0900  Total Visit Time: 48 minutes    Virtual Regular Visit    Verification of patient location:    Patient is located at Home in the following state in which I hold an active license [place]      Assessment/Plan:    Problem List Items Addressed This Visit        Other    Generalized anxiety disorder - Primary    Current moderate episode of major depressive disorder without prior episode ([address])       Goals addressed in session: Goal 1          Reason for visit is   No chief complaint on file. Encounter provider Fernanda Grant    Provider located at [address] [place] [id number] [phone]      Recent Visits  No visits were found meeting these conditions. Showing recent visits within past 7 days and meeting all other requirements  Today's Visits  Date Type Provider Dept   06/16/23 [id number] Christelle Swift today's visits and meeting all other requirements  Future Appointments  No visits were found meeting these conditions. Showing future appointments within next 150 days and meeting all other requirements       The patient was identified by name and date of birth.  Ad Smith was informed that this is a telemedicine visit and that the visit is being conducted throughthe [employer] platform. She agrees to proceed. .  My office door was closed. No one else was in the room. She acknowledged consent and understanding of privacy and security of the video platform. The patient has agreed to participate and understands they can discontinue the visit at any time. Patient is aware this is a billable service. Subjective  Brenda Bagley is a 25 y.o. female  . HPI     Past Medical History:   Diagnosis Date   • Anxiety    • Chronic vaginitis 3/15/2017   • Concussion     Last Assessed: 11/15/2017   • Depression        Past Surgical History:   Procedure Laterality Date   • APPENDECTOMY     • INSERTION OF INTRAUTERINE DEVICE (IUD) N/A        Current Outpatient Medications   Medication Sig Dispense Refill   • albuterol (PROVENTIL HFA,VENTOLIN HFA) 90 mcg/act inhaler Inhale 1-2 puffs  0   • clindamycin (CLEOCIN T) 1 % external solution Apply topically 2 (two) times a day 30 mL 0   • dicyclomine (BENTYL) 20 mg tablet Take 1 tablet (20 mg total) by mouth every 6 (six) hours 60 tablet 0   • ergocalciferol (VITAMIN D2) 50,000 units Take 1 capsule (50,000 Units total) by mouth 2 (two) times a week 24 capsule 0   • hydrOXYzine HCL (ATARAX) 25 mg tablet Take 1 tablet (25 mg total) by mouth daily as needed (moderate-severe anxiety) 30 tablet 1   • Levonorgestrel (MIRENA) 20 MCG/DAY IUD 1 each by Intrauterine route once     • spironolactone (ALDACTONE) 50 mg tablet Take 1 tablet (50 mg total) by mouth 2 (two) times a day 180 tablet 2   • venlafaxine (EFFEXOR-XR) 75 mg 24 hr capsule Take 1 capsule (75 mg total) by mouth daily 90 capsule 1     No current facility-administered medications for this visit. No Known Allergies    Review of Systems    Video Exam    There were no vitals filed for this visit.     Physical Exam

## 2023-06-16 NOTE — PSYCH
Behavioral Health Psychotherapy Progress Note    Psychotherapy Provided: Family Therapy    1. Generalized anxiety disorder        2. Current moderate episode of major depressive disorder without prior episode ([address])            Goals addressed in session: goal 1, want to balance out my time with work, school and time for myself and not feel guilty for taking time with myself. DATA:  MSW met with Brenda  for session. Discussed her job and some issues she is having with her boss. She is being patient with him because  New but, she is very frustrated with him. Was having issues with her school but, after many emails thinks she has all the issues worked out with them. She has a weeks vacation coming up with her parents and her bofriend, she gets back home then,  Leaves the next day for a week long conference with work. She finished up her one summer course  She is on track to graduate in the fall. Things with boyfriend are "ok."      During this session, this clinician used the following therapeutic modalities: Client-centered Therapy, Cognitive Behavioral Therapy, Cognitive Processing Therapy, Solution-Focused Therapy and Supportive Psychotherapy    Substance Abuse was not addressed during this session. If the client is diagnosed with a co-occurring substance use disorder, please indicate any changes in the frequency or amount of use: na. Stage of change for addressing substance use diagnoses: No substance use/Not applicable    ASSESSMENT:  Fang Marr presents with a Euthymic/ normal mood. her affect is Normal range and intensity, which is congruent, with her mood and the content of the session. The client has made progress on their goals. Fang Marr presents with a none risk of suicide, none risk of self-harm, and none risk of harm to others. For any risk assessment that surpasses a "low" rating, a safety plan must be developed.     A safety plan was indicated: no  If yes, describe in detail     PLAN: Between sessions, Cody Dave will begin addressing new tx plan goals. At the next session, the therapist will use Client-centered Therapy, Cognitive Behavioral Therapy, Cognitive Processing Therapy, Solution-Focused Therapy and Supportive Psychotherapy to address tx plan goals and new issues that arise since last session. .    Behavioral Health Treatment Plan and Discharge Planning: Cody Dave is aware of and agrees to continue to work on their treatment plan. They have identified and are working toward their discharge goals. yes    Visit start and stop times:    06/16/23  Start Time: 0812  Stop Time: 0900  Total Visit Time: 48 minutes    Virtual Regular Visit    Verification of patient location:    Patient is located at Home in the following state in which I hold an active license [place]      Assessment/Plan:    Problem List Items Addressed This Visit        Other    Generalized anxiety disorder - Primary    Current moderate episode of major depressive disorder without prior episode ([address])       Goals addressed in session: Goal 1          Reason for visit is   No chief complaint on file. Encounter provider Bobo Gilliam    Provider located at [address] [place] [id number] [phone]      Recent Visits  No visits were found meeting these conditions. Showing recent visits within past 7 days and meeting all other requirements  Today's Visits  Date Type Provider Dept   06/16/23 [id number] Christelle Swift today's visits and meeting all other requirements  Future Appointments  No visits were found meeting these conditions. Showing future appointments within next 150 days and meeting all other requirements       The patient was identified by name and date of birth.  Cody Dave was informed that this is a telemedicine visit and that the visit is being conducted throughthe [employer] platform. She agrees to proceed. .  My office door was closed. No one else was in the room. She acknowledged consent and understanding of privacy and security of the video platform. The patient has agreed to participate and understands they can discontinue the visit at any time. Patient is aware this is a billable service. Subjective  Brenda Billingsley is a 25 y.o. female  . HPI     Past Medical History:   Diagnosis Date   • Anxiety    • Chronic vaginitis 3/15/2017   • Concussion     Last Assessed: 11/15/2017   • Depression        Past Surgical History:   Procedure Laterality Date   • APPENDECTOMY     • INSERTION OF INTRAUTERINE DEVICE (IUD) N/A        Current Outpatient Medications   Medication Sig Dispense Refill   • albuterol (PROVENTIL HFA,VENTOLIN HFA) 90 mcg/act inhaler Inhale 1-2 puffs  0   • clindamycin (CLEOCIN T) 1 % external solution Apply topically 2 (two) times a day 30 mL 0   • dicyclomine (BENTYL) 20 mg tablet Take 1 tablet (20 mg total) by mouth every 6 (six) hours 60 tablet 0   • ergocalciferol (VITAMIN D2) 50,000 units Take 1 capsule (50,000 Units total) by mouth 2 (two) times a week 24 capsule 0   • hydrOXYzine HCL (ATARAX) 25 mg tablet Take 1 tablet (25 mg total) by mouth daily as needed (moderate-severe anxiety) 30 tablet 1   • Levonorgestrel (MIRENA) 20 MCG/DAY IUD 1 each by Intrauterine route once     • spironolactone (ALDACTONE) 50 mg tablet Take 1 tablet (50 mg total) by mouth 2 (two) times a day 180 tablet 2   • venlafaxine (EFFEXOR-XR) 75 mg 24 hr capsule Take 1 capsule (75 mg total) by mouth daily 90 capsule 1     No current facility-administered medications for this visit. No Known Allergies    Review of Systems    Video Exam    There were no vitals filed for this visit.     Physical Exam Number Of Hemigard Strips Per Side: 1

## 2023-06-19 ENCOUNTER — TELEMEDICINE (OUTPATIENT)
Age: 23
End: 2023-06-19
Payer: COMMERCIAL

## 2023-06-19 DIAGNOSIS — L70.0 ACNE VULGARIS: Primary | ICD-10-CM

## 2023-06-19 PROCEDURE — 99214 OFFICE O/P EST MOD 30 MIN: CPT | Performed by: DERMATOLOGY

## 2023-06-19 RX ORDER — DAPSONE 75 MG/G
1 GEL TOPICAL DAILY
Qty: 60 G | Refills: 2 | Status: SHIPPED | OUTPATIENT
Start: 2023-06-19 | End: 2023-06-29 | Stop reason: SDUPTHER

## 2023-06-19 RX ORDER — SPIRONOLACTONE 50 MG/1
50 TABLET, FILM COATED ORAL 2 TIMES DAILY
Qty: 180 TABLET | Refills: 2 | Status: SHIPPED | OUTPATIENT
Start: 2023-06-19 | End: 2023-09-17

## 2023-06-19 NOTE — PATIENT INSTRUCTIONS
Treatment plan:Based on a thorough discussion of this condition and the management approach to it (including a comprehensive discussion of the known risks, side effects and potential benefits of treatment), the patient (family) agrees to implement the following specific plan: Topical medications:                  Morning: Start Dapsone 7 5% cream topically once a day to face until otherwise   Night: Continue Azelaic acid compounded cream withTretinoin once a day at night topically to face until otherwise     Oral medications:                                                     Morning and Night: Continue Spironolactone 50 mg by mouth twice a day  After meal and full glass of water      Continue gentle skin care   If no improvement will consider Accutane   If medication is not covered or too expensive contact office  (will send to [facility])   Follow up 2-3 months      REMEMBER:  Always take your acne pills with lots of water! A pill stuck in your throat can cause significant burning and irritation  Drink a full glass of water to ensure the pill gets into your stomach  Avoid “popping” a pill right before bed, and stay upright for at least 1 hour after taking a pill

## 2023-06-19 NOTE — PROGRESS NOTES
j  Virtual Regular Visit    Verification of patient location:    Patient is located at Home in the following state in which I hold an active license [place]      Assessment/Plan:    Problem List Items Addressed This Visit        Musculoskeletal and Integument    Acne - Primary            Reason for visit is No chief complaint on file  Encounter provider Lonnie Pugh MD    Provider located at [address] [place] [id number] [phone]      Recent Visits  No visits were found meeting these conditions  Showing recent visits within past 7 days and meeting all other requirements  Today's Visits  Date Type Provider Dept   06/19/23 Telemedicine Lonnie Pugh MD [facility]   Showing today's visits and meeting all other requirements  Future Appointments  No visits were found meeting these conditions  Showing future appointments within next 150 days and meeting all other requirements       The patient was identified by name and date of birth  Gay Blandon was informed that this is a telemedicine visit and that the visit is being conducted through the [employer]  She agrees to proceed     My office door was closed  The patient was notified the following individuals were present in the room Jaswant CRESPO   She acknowledged consent and understanding of privacy and security of the video platform  The patient has agreed to participate and understands they can discontinue the visit at any time  Patient is aware this is a billable service  Subjective  Brenda Coulter is a 25 y o  female following up on acne          HPI     Past Medical History:   Diagnosis Date   • Anxiety    • Chronic vaginitis 3/15/2017   • Concussion     Last Assessed: 11/15/2017   • Depression        Past Surgical History:   Procedure Laterality Date   • APPENDECTOMY     • INSERTION OF INTRAUTERINE DEVICE (IUD) N/A        Current "Outpatient Medications   Medication Sig Dispense Refill   • albuterol (PROVENTIL HFA,VENTOLIN HFA) 90 mcg/act inhaler Inhale 1-2 puffs  0   • clindamycin (CLEOCIN T) 1 % external solution Apply topically 2 (two) times a day 30 mL 0   • dicyclomine (BENTYL) 20 mg tablet Take 1 tablet (20 mg total) by mouth every 6 (six) hours 60 tablet 0   • ergocalciferol (VITAMIN D2) 50,000 units Take 1 capsule (50,000 Units total) by mouth 2 (two) times a week 24 capsule 0   • hydrOXYzine HCL (ATARAX) 25 mg tablet Take 1 tablet (25 mg total) by mouth daily as needed (moderate-severe anxiety) 30 tablet 1   • Levonorgestrel (MIRENA) 20 MCG/DAY IUD 1 each by Intrauterine route once     • spironolactone (ALDACTONE) 50 mg tablet Take 1 tablet (50 mg total) by mouth 2 (two) times a day 180 tablet 2   • venlafaxine (EFFEXOR-XR) 75 mg 24 hr capsule Take 1 capsule (75 mg total) by mouth daily 90 capsule 1     No current facility-administered medications for this visit  No Known Allergies    Review of Systems    Video Exam    There were no vitals filed for this visit  Physical Exam     Visit Time  Total Visit Duration: 3 50       Patient Name: Robin Wing  Encounter Date: 06/19/2023     Have you been cared for by a [facility] Dermatologist in the last 3 years and, if so, which description applies to you? Yes  I have been here within the last 3 years, and my medical history has NOT changed since that time  I am FEMALE/of child-bearing potential     REVIEW OF SYSTEMS:  Have you recently had or currently have any of the following? · No changes in my recent health  PAST MEDICAL HISTORY:  Have you personally ever had or currently have any of the following? If \"YES,\" then please provide more detail  · No changes in my medical history  FAMILY HISTORY:  Any \"first degree relatives\" (parent, brother, sister, or child) with the following? • No changes in my family's known health     PATIENT EXPERIENCE:    • Do you want the " "Dermatologist to perform a COMPLETE skin exam today including a clinical examination under the \"bra and underwear\" areas? NO  • If necessary, do we have your permission to call and leave a detailed message on your Preferred Phone number that includes your specific medical information? Yes      No Known Allergies   Current Outpatient Medications:   •  albuterol (PROVENTIL HFA,VENTOLIN HFA) 90 mcg/act inhaler, Inhale 1-2 puffs, Disp: , Rfl: 0  •  clindamycin (CLEOCIN T) 1 % external solution, Apply topically 2 (two) times a day, Disp: 30 mL, Rfl: 0  •  dicyclomine (BENTYL) 20 mg tablet, Take 1 tablet (20 mg total) by mouth every 6 (six) hours, Disp: 60 tablet, Rfl: 0  •  ergocalciferol (VITAMIN D2) 50,000 units, Take 1 capsule (50,000 Units total) by mouth 2 (two) times a week, Disp: 24 capsule, Rfl: 0  •  hydrOXYzine HCL (ATARAX) 25 mg tablet, Take 1 tablet (25 mg total) by mouth daily as needed (moderate-severe anxiety), Disp: 30 tablet, Rfl: 1  •  Levonorgestrel (MIRENA) 20 MCG/DAY IUD, 1 each by Intrauterine route once, Disp: , Rfl:   •  spironolactone (ALDACTONE) 50 mg tablet, Take 1 tablet (50 mg total) by mouth 2 (two) times a day, Disp: 180 tablet, Rfl: 2  •  venlafaxine (EFFEXOR-XR) 75 mg 24 hr capsule, Take 1 capsule (75 mg total) by mouth daily, Disp: 90 capsule, Rfl: 1          • Whom besides the patient is providing clinical information about today's encounter?   o NO ADDITIONAL HISTORIAN (patient alone provided history)    Physical Exam and Assessment/Plan by Diagnosis:    ACNE VULGARIS (\"COMMON ACNE\")    Physical Exam:  • Anatomic Location Affected:   Face   • Morphological Description:  o Open/Closed Comedones:  - Few (\"Mild\")  o Inflammatory Papules/Pustules:  - Few (\"Mild\")  o Nodules:  - Few (\"Mild\")  o Scarring:  - Few (\"Mild\")  o Excoriations:  - Few (\"Mild\")  o Local Skin Redness/Erythema:  - Few (\"Mild\")  o Local Skin Dryness/Scaling:  - Few (\"Mild\")  o Local Skin Dyspigmentation:  - Few " "(\"Mild\")      Additional History of Present Condition:  Previous visit advised to continue doxycycline 100 bid for 2 months, spironolactone 50 mg bid and compounded azelaic acid cream   She states at this time she is only using the topical and taking spironolactone  She states her chest and back cleared up but her face is still the same  She states she has been moisturizing prior to applying topical which seems to help with dryness     Treatment plan:Based on a thorough discussion of this condition and the management approach to it (including a comprehensive discussion of the known risks, side effects and potential benefits of treatment), the patient (family) agrees to implement the following specific plan: Topical medications:                  Morning: Start Dapsone 7 5% cream topically once a day to face until otherwise   Night: Continue Azelaic acid compounded cream withTretinoin once a day at night topically to face until otherwise     Oral medications:                                                     Morning and Night: Continue Spironolactone 50 mg by mouth twice a day  After meal and full glass of water      • Continue gentle skin care   • If no improvement will consider Accutane   • If medication is not covered or too expensive contact office  (will send to [facility])   • Follow up 2-3 months      REMEMBER:  Always take your acne pills with lots of water! A pill stuck in your throat can cause significant burning and irritation  Drink a full glass of water to ensure the pill gets into your stomach  Avoid “popping” a pill right before bed, and stay upright for at least 1 hour after taking a pill  ACNE:  WHAT ZIT ALL ABOUT? WHY DO I HAVE ACNE/PIMPLES? Your skin is made of layers  To keep the skin from becoming dry and cracked, the skin needs oil  The oil is made in little wells in the deeper layers in the skin    People with acne have glands that make more oil and are more easily " plugged, causing the glands to swell  Hormones, bacteria and your inherited tendency to have acne all play a role  The medical term for “pimples” is acne or acne vulgaris (vulgaris means “common”)  Most people get some acne  Acne does not come from being dirty  Instead, it is an expected consequence of changes that occur during normal growth and development  Hormones, bacteria, and your family's tendency to have acne may all play a role  “Whiteheads” or “blackheads” are openings of the glands (glands are the oil factories) onto the surface of the skin  “Blackheads” are not caused by dirt blocking the pores; instead, they result from the oxidation reaction of oil and skin in the pores with the air (like a “rust” reaction)  WHAT ABOUT STRESS? Stress does not “cause” acne but it can make it worse  Make sure you get enough sleep and daily exercise! WHAT ABOUT FOODS/DIET? Try to eat a balanced, healthy diet  Some people feel that certain foods worsen their acne  While there aren't many studies available on this question, severe dietary changes are unlikely to help your acne and may be harmful to the health of your skin  If you find that a certain food seems to aggravate your acne, you may consider avoiding that food  Discuss this with your physician! WHAT CAUSES MY ACNE? There are four contributors to acne--the body's natural oil (sebum), clogged pores, bacteria (with the scientific name Propionibacterium acnes, or P  acnes, for short), and the body's reaction to the bacteria living in the clogged pores (which causes inflammation)  Here's what happens:    • Sebum is produced in the normal oil-making glands in the deeper layers of the skin and reaches the surface through the skin's pores  An increase in certain hormones occurs around the time of puberty, and these hormones trigger the oil glands to produce increased amounts of sebum    • Pores with excess oil tend to become clogged more easily  • At the same time, P  acnes--one of the many types of bacteria that normally live on everyone's skin--thrives in the excess oil and causes a skin reaction (inflammation)  • If a pore is clogged close to the surface, there is little inflammation  However, this results in the formation of “whiteheads” (closed comedones) or “blackheads” (open comedones) at the surface of the skin  • A plug that extends to, or forms a little deeper in the pore, or one that enlarges or ruptures may cause more inflammation  The result is red bumps (papules) and pus-filled pimples (pustules)  • If plugging happens in the deepest skin layer, the inflammation may be even more severe, resulting in the formation of nodules or cysts  When these types of acne heal, they may leave behind discolored areas or true scars  SKIN HYGIENE:  HOW SHOULD I [facility] MY SKIN? Acne does not come from being dirty, however, washing your face is part of taking good care of your skin and will help keep your face clear  Good skin hygiene is, therefore, critical to support any acne treatment plan  Here are several specific suggestions for practicing good skin hygiene and keeping your skin looking its best:    • You should wash acne-prone skin TWICE A DAY: Once in the morning and once in the evening  This does include any showers you take that day, so do not overdo it! • Do not scrub the skin with a washcloth or loofah as these can irritate and inflame your acne  Acne does not come from “dirt”, so it is not necessary to scrub the skin clean  In fact, scrubbing may lead to dryness and irritation that makes the acne even worse and harder for patients to tolerate acne medications  • Use a gentle facial moisturizing cleanser (Cetaphil Moisturizing Cleanser or Dove Fragrance-Free bar)  Avoid using soaps like Daphnie Dalton [address], [address] [facility], or soft/liquid soaps as these products will dry your skin    • Do not use any over-the-counter “acne washes” without your doctor's specific instruction to do so  These products often contain salicylic acid or benzoyl peroxide  These ingredients can be helpful in clearing oil from the skin and reducing bacteria, but they may also be drying and can add to irritation  • Do not use exfoliating products with microbeads or brushes as these can cause irritation to the skin which can worsen the acne  • Facials and other treatments to remove, squeeze, or “clean out” pores, if done by a , can help the acne  They should not be done more than every 8 weeks  • Try not to “pop pimples” or pick at your acne as this can delay healing and may result in scarring or skin color changes (“dark spots”) that are often more noticeable than the acne itself  Picking/popping acne can also cause a serious skin infection  • Wash or change your pillow case once to twice a week, especially if you use products in your hair  • Wash the skin as soon as possible after playing sports or other activities that cause a lot of sweating  Also, pay attention to how your sports equipment (shoulder pads, helmet strap, etc ) might be making your acne worse  • When you use makeup, moisturizer, or sunscreen make sure that these products are labeled “non-comedogenic,” or “won't clog pores,” or “won't cause acne ”           WHAT ACNE TREATMENTS ARE AVAILABLE? Medications for acne try to stop the formation of new pimples by reducing or removing the oil, bacteria, and other things (like dead skin cells) that clog the pores  They can also decrease the inflammation or irritation response of the skin to bacteria  It may take from 6 to 8 weeks (about 2 months!) before you see any improvement and know if the medication is effective  It takes the layers of skin this long to regenerate  Remember, these medications do not “cure” the condition--the acne improves because of the medication   Therefore, treatment must be continued in order to prevent the return of acne lesions  There are many types of acne treatments  Some are applied to the skin (“topical” medications) and some are taken by mouth (“oral” medications)  In most cases of mild acne, the doctor will start with a topical medication  There are many different topical medications that are helpful for acne  If acne is more severe and it does not respond adequately to a topical medication, or if it covers large body surface areas such as the back and/or chest, oral antibiotics such as Doxycycline or Minocycline and/or oral hormone therapy such as Oral Contraceptive Pills or Spironolactone may be prescribed  In the most severe cases, isotretinoin (Accutane) may be used  In general, it is usually best to start with acne medications that are least likely to cause side effects but are at the same time capable of addressing the specific causes for the acne  Some patients have a good result with just one medication, but many will need to use a combination of treatments: two or more different topical agents or an oral medication plus a topical medication  Another treatment used for acne may include corticosteroid injections, which are used to help relieve pain, decrease the size, and encourage the healing of large, inflamed acne nodules  Also, dermatologists sometimes perform “acne surgery,” using a fine needle, a pointed blade, or an instrument known as a comedone extractor to mechanically clean out clogged pores  One must always weigh the risk for inducing a scar with the potential benefits of any procedure  Prior treatment with topical retinoids can “loosen” whiteheads and blackheads and make it easier to physically remove such lesions  Heat-based devices, and light and laser therapy are being studied to see whether there is any role for such treatments in mild to moderate acne  At this time, there is not enough evidence to make general recommendations about their use      TOPICAL ACNE MEDICATIONS    WHAT KIND OF TOPICALS ARE THERE? • Benzoyl peroxide (BP) helps to fight inflammation and is anti-microbial (kills bacteria, viruses, and other microorganisms) and is believed to help prevent resistance of bacteria to topical antibiotics  A benzoyl peroxide “wash” may be recommended for use on large areas such as the chest and/or back  Mild irritation and dryness are common when first using benzoyl peroxide-containing products  Be careful because benzoyl peroxide can bleach towels and clothing! • Retinoids (such as adapalene, tretinoin, or tazarotene) unplug the oil glands by helping peel away the layers of skin and other things plugging the opening of the glands  Mild irritation and dryness are common when first using these products  Facial waxing and other skin procedures can lead to excessive irritation and should be avoided during retinoid therapy  • Antibiotics fight bacteria and help decrease inflammation  Topical antibiotics commonly used in acne include clindamycin, erythromycin, and combination agents (such as clindamycin/benzoyl peroxide or erythromycin/benzoyl peroxide)  Mild irritation and dryness are common when first using these products  Typically, topical antibiotics should not be used alone as treatment for acne  • Other topical agents include salicylic acid, azelaic acid, dapsone, and sulfacetamide  Mild irritation and dryness can also occur when first using these products  USING YOUR TOPICAL TREATMENTS LIKE A PRO  • Apply topical medications only to clean, dry skin  Topical medications may lead to significant dryness of the affected areas  To minimize this, wait 15-20 minutes after washing before applying your topical medication  • These medications work deep in the skin to prevent new breakouts  “Spot treatment” of individual pimples does not do much  When applying topical medications to the face, use the “5-dot” method   Start by placing a small pea-sized amount of the medication on your finger  Then, place “dots” in each of five locations of your face: Mid-forehead, each cheek, nose, and chin  Next, rub the medication into the entire area of skin - not just on individual pimples! Try to avoid the delicate skin around your eyes and corners of your mouth  • The medications are not magic! They take weeks if not months to work  Be patient and use your medicine on a daily basis or as directed for six weeks before asking if your skin looks better  Try not to miss more than one or two days each week when using your medications  • If you are starting a new medication, then try using it “every other night” or even “every third night ” Gradually work up to [employer] a day ”  This will give your skin time to adjust   • The same medications often come in various forms or formulations: Creams, ointments, lotions, gels, microspheres, or foams  Use the formulation that has been recommended and don't switch to other forms unless instructed  Some forms (such as alcohol based gels) may be more drying and less tolerable for certain skin types  • Sometimes individual medications are not as effective as a combination of two or more agents  The doctor may need to try several medications or combinations before finding the one that is best for that patient  • Moisturizer, sunscreen, and make-up may be used in conjunction with topical acne medications  In general, acne medications are applied first so they may directly contact the skin  Ask your physician to review specific application instructions! • It is especially important to always use sunscreen when using a topical retinoid or oral antibiotic  These drugs can make your skin more sensitive to the sun  In general, sunscreen gets applied AFTER any acne medications  • Don't stop using your acne medications just because your acne got better  Remember, the acne is better because of the medication, and prevention is the hanna to treatment        ORAL ACNE MEDICATIONS    ORAL ANTIBIOTICS  Antibiotics include tetracycline-class medicines (which include the most commonly used oral antibiotics for acne, minocycline, and doxycycline), erythromycin, trimethoprim-sulfamethoxazole, and occasionally cephalexin or azithromycin  These drugs may decrease bacteria and inflammation, and they are most effective for moderate-to-severe “inflammatory” acne  A product containing benzoyl peroxide should be used along with these antibiotics to help decrease the possibility of microbial resistance  Always take your acne pills with lots of water! A pill stuck in your throat can cause significant burning and irritation  Drink a full glass of water to ensure the pill gets into your stomach  Avoid “popping” a pill right before bed, and stay upright for at least 1 hour after taking a pill  HORMONAL THERAPY  Hormonal treatment is used only in females and usually consists of oral contraceptives (birth control pills)  Spironolactone is also sometimes used  ORAL CONTRACEPTIVE PILLS   This medication is also known as the “Birth Control Pill ”  We use it for hormonal regulation of acne  Take this medication as directed on the medication packet  NOTE: Try to find a regular time in your day to take the pill so that you don't forget  The best time is about half an hour after a meal or snack, or at bedtime  If you do forget to take your daily pill at the regular time, take one as soon as you remember and take the next at your regular scheduled time     WARNING: Do not take this medication until discussing it with your physician if you smoke, are pregnant (or trying to become pregnant or could be pregnant), have a personal history of breast cancer, have any artificial hardware or implants, have a condition called Factor 5 Leiden deficiency, have a family history of clotting problems, regularly have migraine headaches (especially with aura or due to flashing lights), or have any "vaginal bleeding other than that associated with your menstrual cycle  SPIRONOLACTONE  This medication was originally used as a \"water-pill\" for patients with high blood pressure  Dermatologists use it in low doses to block the effects of male hormone on the hair follicle and oil glands  ORAL ISOTRETINOIN (used to be called the brand name “ACCUTANE”)  Isotretinoin, a derivative of vitamin A, is a powerful drug with several significant potential side effects  It is reserved for acne which is severe or when other medications have not worked well enough  It used to be sold under the brand name “Accutane” but now several versions exist       HAVING PROBLEMS WITH ANY OF YOUR TREATMENTS? You should not be able to see any of the medicines on your face  If you can see a white film on your skin after you apply the medication, there is too much medicine in that area and you need to apply a thinner coat and make sure it is spread evenly on your face  If your skin gets too dry, you can apply a light (“non-comedogenic”) moisturizer on top of your medicine or you may switch to using the medicine every other day instead of every day  If your skin is still too irritated, you may need to switch to a milder medication  If your skin is red and very itchy, you may be allergic to the medication and you should stop using it  COMMON POSSIBLE SIDE EFFECTS OF MEDICATIONS    • Retinoids - dryness, redness, increased sun sensitivity  • Spironolactone - headache, upset stomach, dizziness, breast tenderness or irregular periods  Usually these effects lessen with time  • Benzoyl peroxide - drying, redness, bleaching of clothes, towels and sheets, allergy  • Doxycycline - headaches; dizziness; irritation of the throat; nail changes; discoloration of teeth  • Sun sensitivity - even if you have dark skin, this medicine can make you burn more easily    Make sure you protect yourself from the sun, either by avoiding " being outside between 11 AM and 3 PM, wearing and reapplying sunscreen/sunblock, or wearing sun protective clothing  • Nausea/vomiting - if you experience nausea with this medication, take it with food  • Minocycline - headaches; dizziness; vision problems,  irritation of the throat; discoloration of scars, gums, or teeth  Can rarely cause liver disease, joint pains, and flu-like symptoms  • If you should notice yellowing of the skin or any of the above, notify your doctor and stop using the medication  • Birth Control Pills - nausea; headaches; breast tenderness; feeling bloated; mood changes  • Spotting between periods may occur for the first three weeks of the medication, but this is not serious  It may last for two or three cycles  Please call us if the bleeding is heavier than a light flow or lasts for more than a few days  WHEN AND WHERE TO CALL WITH CONCERNS  We are here to help! If you experience any unusual symptoms, then stop taking or using the medication and call our office at [phone] (SKIN)  It is better to be safe than to be sorry!     Scribe Attestation    I,:   am acting as a scribe while in the presence of the attending physician :       I,:   personally performed the services described in this documentation    as scribed in my presence :

## 2023-06-21 ENCOUNTER — TELEMEDICINE (OUTPATIENT)
Dept: BEHAVIORAL/MENTAL HEALTH CLINIC | Facility: CLINIC | Age: 23
End: 2023-06-21
Payer: COMMERCIAL

## 2023-06-21 DIAGNOSIS — F32.1 CURRENT MODERATE EPISODE OF MAJOR DEPRESSIVE DISORDER WITHOUT PRIOR EPISODE (HCC): ICD-10-CM

## 2023-06-21 DIAGNOSIS — F41.1 GENERALIZED ANXIETY DISORDER: Primary | ICD-10-CM

## 2023-06-21 PROCEDURE — 90834 PSYTX W PT 45 MINUTES: CPT | Performed by: SOCIAL WORKER

## 2023-06-21 NOTE — PSYCH
"Behavioral Health Psychotherapy Progress Note    Psychotherapy Provided: Family Therapy    1  Generalized anxiety disorder        2  Current moderate episode of major depressive disorder without prior episode ([address] )            Goals addressed in session: goal 1, want to balance out my time with work, school and time for myself and not feel guilty for taking time with myself  DATA:  MSW met with Brenda  for session  Discussed her relationship and her finances  She is very financially responsible  \"His parents blame her for his not having a care free young 19's life\" which she does not allow it to bother her  Her boyfriend smokes a great deal of marijuana which \"doesn't effect her now but she knows it will in the future when she wants to have a baby, they need the money to go on vacation (he has not applied to jobs due to he can't pass a drug test), etc   Discussed for those reasons it does effect her now and discussed how it does  During this session, this clinician used the following therapeutic modalities: Client-centered Therapy, Cognitive Behavioral Therapy, Cognitive Processing Therapy, Solution-Focused Therapy and Supportive Psychotherapy    Substance Abuse was not addressed during this session  If the client is diagnosed with a co-occurring substance use disorder, please indicate any changes in the frequency or amount of use: na  Stage of change for addressing substance use diagnoses: No substance use/Not applicable    ASSESSMENT:  Kaye Schwab presents with a Euthymic/ normal mood  her affect is Normal range and intensity, which is congruent, with her mood and the content of the session  The client has made progress on their goals  Kaye Schwab presents with a none risk of suicide, none risk of self-harm, and none risk of harm to others  For any risk assessment that surpasses a \"low\" rating, a safety plan must be developed      A safety plan was indicated: no  If yes, describe " in detail     PLAN: Between sessions, Robin Wing will begin addressing new tx plan goals  At the next session, the therapist will use Client-centered Therapy, Cognitive Behavioral Therapy, Cognitive Processing Therapy, Solution-Focused Therapy and Supportive Psychotherapy to address tx plan goals and new issues that arise since last session       Behavioral Health Treatment Plan and Discharge Planning: Robin Wing is aware of and agrees to continue to work on their treatment plan  They have identified and are working toward their discharge goals  yes    Visit start and stop times:    06/21/23  Start Time: 1520  Stop Time: 1600  Total Visit Time: 40 minutes    Virtual Regular Visit    Verification of patient location:    Patient is located at Home in the following state in which I hold an active license [place]      Assessment/Plan:    Problem List Items Addressed This Visit    None      Goals addressed in session: Goal 1          Reason for visit is   No chief complaint on file  Encounter provider Monique Carrizales    Provider located at [address] [place] [id number] [phone]      Recent Visits  Date Type Provider Dept   06/16/23 [facility] Therapist [facility]   Showing recent visits within past 7 days and meeting all other requirements  Future Appointments  No visits were found meeting these conditions  Showing future appointments within next 150 days and meeting all other requirements       The patient was identified by name and date of birth  Robin Wing was informed that this is a telemedicine visit and that the visit is being conducted throughthe [employer] platform  She agrees to proceed     My office door was closed  No one else was in the room  She acknowledged consent and understanding of privacy and security of the video platform   The patient has agreed to participate and understands they can discontinue the visit at any time  Patient is aware this is a billable service  Subjective  Brenda Casas  is a 25 y o  female    HPI     Past Medical History:   Diagnosis Date   • Anxiety    • Chronic vaginitis 3/15/2017   • Concussion     Last Assessed: 11/15/2017   • Depression        Past Surgical History:   Procedure Laterality Date   • APPENDECTOMY     • INSERTION OF INTRAUTERINE DEVICE (IUD) N/A        Current Outpatient Medications   Medication Sig Dispense Refill   • albuterol (PROVENTIL HFA,VENTOLIN HFA) 90 mcg/act inhaler Inhale 1-2 puffs  0   • clindamycin (CLEOCIN T) 1 % external solution Apply topically 2 (two) times a day 30 mL 0   • Dapsone 7 5 % GEL Apply 1 Application topically in the morning 60 g 2   • dicyclomine (BENTYL) 20 mg tablet Take 1 tablet (20 mg total) by mouth every 6 (six) hours 60 tablet 0   • ergocalciferol (VITAMIN D2) 50,000 units Take 1 capsule (50,000 Units total) by mouth 2 (two) times a week 24 capsule 0   • hydrOXYzine HCL (ATARAX) 25 mg tablet Take 1 tablet (25 mg total) by mouth daily as needed (moderate-severe anxiety) 30 tablet 1   • Levonorgestrel (MIRENA) 20 MCG/DAY IUD 1 each by Intrauterine route once     • spironolactone (ALDACTONE) 50 mg tablet Take 1 tablet (50 mg total) by mouth 2 (two) times a day 180 tablet 2   • venlafaxine (EFFEXOR-XR) 75 mg 24 hr capsule Take 1 capsule (75 mg total) by mouth daily 90 capsule 1     No current facility-administered medications for this visit  No Known Allergies    Review of Systems    Video Exam    There were no vitals filed for this visit      Physical Exam

## 2023-06-29 ENCOUNTER — PATIENT MESSAGE (OUTPATIENT)
Age: 23
End: 2023-06-29

## 2023-06-29 DIAGNOSIS — L70.0 ACNE VULGARIS: ICD-10-CM

## 2023-06-29 RX ORDER — DAPSONE 75 MG/G
1 GEL TOPICAL DAILY
Qty: 60 G | Refills: 2 | Status: SHIPPED | OUTPATIENT
Start: 2023-06-29 | End: 2023-07-29

## 2023-07-03 ENCOUNTER — TELEMEDICINE (OUTPATIENT)
Dept: BEHAVIORAL/MENTAL HEALTH CLINIC | Facility: CLINIC | Age: 23
End: 2023-07-03
Payer: COMMERCIAL

## 2023-07-03 DIAGNOSIS — F32.1 CURRENT MODERATE EPISODE OF MAJOR DEPRESSIVE DISORDER WITHOUT PRIOR EPISODE (HCC): ICD-10-CM

## 2023-07-03 DIAGNOSIS — F41.1 GENERALIZED ANXIETY DISORDER: Primary | ICD-10-CM

## 2023-07-03 PROCEDURE — 90834 PSYTX W PT 45 MINUTES: CPT | Performed by: SOCIAL WORKER

## 2023-07-03 NOTE — PSYCH
Behavioral Health Psychotherapy Progress Note    Psychotherapy Provided: Family Therapy    1. Generalized anxiety disorder        2. Current moderate episode of major depressive disorder without prior episode ([address])            Goals addressed in session: goal 1, want to balance out my time with work, school and time for myself and not feel guilty for taking time with myself. DATA:  MSW met with Brenda  for session. Her birthday was on Fri. And they went out with friends. Her boyfriend wasn't feeling good mentally. Later that night had SI. They went to her parents and talked in the car on the way there. She confronted him on his marijuana smoking. Her parents have offered to pay for his therapy out of pocket and for meds/med eval but, "he has to decrease his smoking."  She realizes that now is the time follow up with him before things "go back to normal and all is fine."   MSW recommended that if SI again then take him to [facility]. If admitted they would fill out a MA leda to see if MS could help him pay for the bill since he does not have ins or make much money. She "had some good new, her part time job offered her a full time job" which she accepted. She will be quitting her other two part time jobs. Will develop tx plan next session. During this session, this clinician used the following therapeutic modalities: Client-centered Therapy, Cognitive Behavioral Therapy, Cognitive Processing Therapy, Solution-Focused Therapy and Supportive Psychotherapy    Substance Abuse was not addressed during this session. If the client is diagnosed with a co-occurring substance use disorder, please indicate any changes in the frequency or amount of use: na. Stage of change for addressing substance use diagnoses: No substance use/Not applicable    ASSESSMENT:  Idalmis Davis presents with a Euthymic/ normal mood.      her affect is Normal range and intensity, which is congruent, with her mood and the content of the session. The client has made progress on their goals. Deb Trammell presents with a none risk of suicide, none risk of self-harm, and none risk of harm to others. For any risk assessment that surpasses a "low" rating, a safety plan must be developed. A safety plan was indicated: no  If yes, describe in detail     PLAN: Between sessions, Brenda Gilbert will begin addressing new tx plan goals. At the next session, the therapist will use Client-centered Therapy, Cognitive Behavioral Therapy, Cognitive Processing Therapy, Solution-Focused Therapy and Supportive Psychotherapy to address tx plan goals and new issues that arise since last session. .    Behavioral Health Treatment Plan and Discharge Planning: Deb Trammell is aware of and agrees to continue to work on their treatment plan. They have identified and are working toward their discharge goals. yes    Visit start and stop times:    07/03/23  Start Time: 0915  Stop Time: 1000  Total Visit Time: 45 minutes    Virtual Regular Visit    Verification of patient location:    Patient is located at Home in the following state in which I hold an active license [place]      Assessment/Plan:    Problem List Items Addressed This Visit    None      Goals addressed in session: Goal 1          Reason for visit is   No chief complaint on file. Encounter provider Lois Camargo    Provider located at [address] [place] [id number] [phone]      Recent Visits  No visits were found meeting these conditions. Showing recent visits within past 7 days and meeting all other requirements  Future Appointments  No visits were found meeting these conditions. Showing future appointments within next 150 days and meeting all other requirements       The patient was identified by name and date of birth.  Deb Trammell was informed that this is a telemedicine visit and that the visit is being conducted throughthe [employer] platform. She agrees to proceed. .  My office door was closed. No one else was in the room. She acknowledged consent and understanding of privacy and security of the video platform. The patient has agreed to participate and understands they can discontinue the visit at any time. Patient is aware this is a billable service. Subjective  Brenda Ham is a 26 y.o. female  . HPI     Past Medical History:   Diagnosis Date   • Anxiety    • Chronic vaginitis 3/15/2017   • Concussion     Last Assessed: 11/15/2017   • Depression        Past Surgical History:   Procedure Laterality Date   • APPENDECTOMY     • INSERTION OF INTRAUTERINE DEVICE (IUD) N/A        Current Outpatient Medications   Medication Sig Dispense Refill   • albuterol (PROVENTIL HFA,VENTOLIN HFA) 90 mcg/act inhaler Inhale 1-2 puffs  0   • Dapsone 7.5 % GEL Apply 1 Application topically in the morning 60 g 2   • dicyclomine (BENTYL) 20 mg tablet Take 1 tablet (20 mg total) by mouth every 6 (six) hours 60 tablet 0   • ergocalciferol (VITAMIN D2) 50,000 units Take 1 capsule (50,000 Units total) by mouth 2 (two) times a week 24 capsule 0   • hydrOXYzine HCL (ATARAX) 25 mg tablet Take 1 tablet (25 mg total) by mouth daily as needed (moderate-severe anxiety) 30 tablet 1   • Levonorgestrel (MIRENA) 20 MCG/DAY IUD 1 each by Intrauterine route once     • spironolactone (ALDACTONE) 50 mg tablet Take 1 tablet (50 mg total) by mouth 2 (two) times a day 180 tablet 2   • venlafaxine (EFFEXOR-XR) 75 mg 24 hr capsule Take 1 capsule (75 mg total) by mouth daily 90 capsule 1     No current facility-administered medications for this visit. No Known Allergies    Review of Systems    Video Exam    There were no vitals filed for this visit.     Physical Exam

## 2023-07-07 ENCOUNTER — TELEPHONE (OUTPATIENT)
Dept: PSYCHIATRY | Facility: CLINIC | Age: 23
End: 2023-07-07

## 2023-07-12 ENCOUNTER — TELEMEDICINE (OUTPATIENT)
Dept: BEHAVIORAL/MENTAL HEALTH CLINIC | Facility: CLINIC | Age: 23
End: 2023-07-12
Payer: COMMERCIAL

## 2023-07-12 DIAGNOSIS — F32.1 CURRENT MODERATE EPISODE OF MAJOR DEPRESSIVE DISORDER WITHOUT PRIOR EPISODE (HCC): Primary | ICD-10-CM

## 2023-07-12 DIAGNOSIS — F41.1 GENERALIZED ANXIETY DISORDER: ICD-10-CM

## 2023-07-12 PROCEDURE — 90846 FAMILY PSYTX W/O PT 50 MIN: CPT | Performed by: SOCIAL WORKER

## 2023-07-12 NOTE — PSYCH
Treatment Plan Tracking    Treatment Plan not completed within required time limits due to: Mayuri Segovia had a family session with boyfriend. Behavioral Health Psychotherapy Progress Note    Psychotherapy Provided: Family Therapy    1. Current moderate episode of major depressive disorder without prior episode ([address])        2. Generalized anxiety disorder            Goals addressed in session: goal 1, want to balance out my time with work, school and time for myself and not feel guilty for taking time with myself. DATA:  MSW met with Brenda and her boyfriend  for session. He has obtained his own therapist which he is waiting to get into for him. He is also working on going back to school for . He "decided he did want to hurt his family or girlfriend."  He started this change due to her confronting him. He was having SI which he no longer has. She discussed in the past how he confronted her and she changed. Discussed them supporting each other. During this session, this clinician used the following therapeutic modalities: Client-centered Therapy, Cognitive Behavioral Therapy, Cognitive Processing Therapy, Solution-Focused Therapy and Supportive Psychotherapy    Substance Abuse was not addressed during this session. If the client is diagnosed with a co-occurring substance use disorder, please indicate any changes in the frequency or amount of use: na. Stage of change for addressing substance use diagnoses: No substance use/Not applicable    ASSESSMENT:  Mayuri Segovia presents with a Euthymic/ normal mood. her affect is Normal range and intensity, which is congruent, with her mood and the content of the session. The client has made progress on their goals. Mayuri Segovia presents with a none risk of suicide, none risk of self-harm, and none risk of harm to others. For any risk assessment that surpasses a "low" rating, a safety plan must be developed.     A safety plan was indicated: no  If yes, describe in detail     PLAN: Between sessions, Brenda Maradiaga will begin addressing new tx plan goals. At the next session, the therapist will use Client-centered Therapy, Cognitive Behavioral Therapy, Cognitive Processing Therapy, Solution-Focused Therapy and Supportive Psychotherapy to address tx plan goals and new issues that arise since last session. .    Behavioral Health Treatment Plan and Discharge Planning: Cody Dave is aware of and agrees to continue to work on their treatment plan. They have identified and are working toward their discharge goals. yes    Visit start and stop times:    07/12/23  Start Time: 1612  Stop Time: 1700  Total Visit Time: 48 minutes    Virtual Regular Visit    Verification of patient location:    Patient is located at Home in the following state in which I hold an active license [place]      Assessment/Plan:    Problem List Items Addressed This Visit    None      Goals addressed in session: Goal 1          Reason for visit is   No chief complaint on file. Encounter provider Bobo Gilliam    Provider located at [address] [place] [address] Road [id number] [phone]      Recent Visits  No visits were found meeting these conditions. Showing recent visits within past 7 days and meeting all other requirements  Today's Visits  Date Type Provider Dept   07/12/23 [id number] Christelle Swift today's visits and meeting all other requirements  Future Appointments  No visits were found meeting these conditions. Showing future appointments within next 150 days and meeting all other requirements       The patient was identified by name and date of birth. Cody Dave was informed that this is a telemedicine visit and that the visit is being conducted throughthe [employer] platform. She agrees to proceed. .  My office door was closed. No one else was in the room. She acknowledged consent and understanding of privacy and security of the video platform. The patient has agreed to participate and understands they can discontinue the visit at any time. Patient is aware this is a billable service. Subjective  Brenda Turk is a 26 y.o. female  . HPI     Past Medical History:   Diagnosis Date   • Anxiety    • Chronic vaginitis 3/15/2017   • Concussion     Last Assessed: 11/15/2017   • Depression        Past Surgical History:   Procedure Laterality Date   • APPENDECTOMY     • INSERTION OF INTRAUTERINE DEVICE (IUD) N/A        Current Outpatient Medications   Medication Sig Dispense Refill   • albuterol (PROVENTIL HFA,VENTOLIN HFA) 90 mcg/act inhaler Inhale 1-2 puffs  0   • Dapsone 7.5 % GEL Apply 1 Application topically in the morning 60 g 2   • dicyclomine (BENTYL) 20 mg tablet Take 1 tablet (20 mg total) by mouth every 6 (six) hours 60 tablet 0   • ergocalciferol (VITAMIN D2) 50,000 units Take 1 capsule (50,000 Units total) by mouth 2 (two) times a week 24 capsule 0   • hydrOXYzine HCL (ATARAX) 25 mg tablet Take 1 tablet (25 mg total) by mouth daily as needed (moderate-severe anxiety) 30 tablet 1   • Levonorgestrel (MIRENA) 20 MCG/DAY IUD 1 each by Intrauterine route once     • spironolactone (ALDACTONE) 50 mg tablet Take 1 tablet (50 mg total) by mouth 2 (two) times a day 180 tablet 2   • venlafaxine (EFFEXOR-XR) 75 mg 24 hr capsule Take 1 capsule (75 mg total) by mouth daily 90 capsule 1     No current facility-administered medications for this visit. No Known Allergies    Review of Systems    Video Exam    There were no vitals filed for this visit.     Physical Exam

## 2023-07-19 DIAGNOSIS — N76.1 CHRONIC VAGINITIS: ICD-10-CM

## 2023-07-19 RX ORDER — FLUCONAZOLE 150 MG/1
TABLET ORAL
Qty: 2 TABLET | Refills: 0 | Status: SHIPPED | OUTPATIENT
Start: 2023-07-19 | End: 2023-07-22

## 2023-07-20 DIAGNOSIS — R19.8 CHANGE IN BOWEL FUNCTION: ICD-10-CM

## 2023-07-20 RX ORDER — DICYCLOMINE HCL 20 MG
20 TABLET ORAL EVERY 6 HOURS
Qty: 60 TABLET | Refills: 0 | Status: SHIPPED | OUTPATIENT
Start: 2023-07-20

## 2023-07-25 ENCOUNTER — TELEMEDICINE (OUTPATIENT)
Dept: PSYCHIATRY | Facility: CLINIC | Age: 23
End: 2023-07-25

## 2023-07-25 DIAGNOSIS — F32.4 MAJOR DEPRESSIVE DISORDER WITH SINGLE EPISODE, IN PARTIAL REMISSION (HCC): ICD-10-CM

## 2023-07-25 DIAGNOSIS — F41.1 GENERALIZED ANXIETY DISORDER: Primary | ICD-10-CM

## 2023-07-25 RX ORDER — VENLAFAXINE HYDROCHLORIDE 75 MG/1
75 CAPSULE, EXTENDED RELEASE ORAL DAILY
Qty: 90 CAPSULE | Refills: 1 | Status: SHIPPED | OUTPATIENT
Start: 2023-07-25 | End: 2024-01-21
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
## 2023-07-25 NOTE — PATIENT INSTRUCTIONS
Please take the following medications: Call if you are having any side effects or would like to make any changes to dose or frequency. Continue Effexor XR 75 mg daily. Continue Atarax 25 mg up to once daily PRN ONLY AS NEEDED for severe anxiety, last use in April 2023. May consider Wellbutrin  mg for sexual dysfunction at next visit after talking to PCP. - PARQ completed including induction of sarika, decreased seizure threshold and risk with alcohol or electrolyte disturbances, headaches, hypertension and cardiovascular effects, GI distress, weight loss, agitation/activation, dizziness, tremor, anxiety, potential for drug interactions, and others. Continue practicing coping strategies, positive affirmations, reframing thoughts, prioritizing sleep hygiene, lifestyle modifications including increasing DAILY exercise, daily morning sunshine, focusing on healthier whole food options and limiting or eliminating packaged items/snacks with added sugars and processed oils, and to maintain adequate hydration, increase mindfulness activities such as journaling, and incorporate more interests and hobbies, walks in nature, breathing techniques and guided meditation, and socialization with friends and family. Continue scheduled psychotherapy with Lois Camargo every 2-3 weeks. Patient informed to continue annual follow-ups with PCP and obtain annual fasting labs ordered by PCP. Follow-up in 4 months. Please call the office nursing staff for medication issues including refills, problems getting medications, bothersome side effects, etc., at [phone]. Please return for a follow up appointment as discussed and arrive approximately 15 minutes prior to your appointment time.  If you are running late or are unable to attend your appointment, please call our [place] office at [phone] (fax: [phone]), or if you were seen in the [address] [place] [address] office, please call [phone] (fax [phone]). Look up "grounding techniques" and/or "anchoring demonstration" online and try a few to see what may work for you. Practice these skills before you need them, when you are not feeling too anxious or triggered. You can also search for free guided meditation videos online to help improve your head space when you are feeling very anxious or triggered. Recommendations regarding insomnia:  Wake-up at the same time every day  Refrain from "napping". Refrain from going to bed unless you're tired  Utilize your bedroom for sleep only. Avoid use of electronics including television and/or cellphone/computers. Refrain from use of electronics including television and/or cellphones/computers prior to bed  Turn your alarm clock away so the light is not visible. Attempt relaxation using various means like reading if you're restless in bed for approximately 15-20 minutes. Participate in regular physical activities like exercise, although avoid approximately 3-4 hours prior to bed. Morning exercise is ideal.  Avoid caffeine use prior to bedtime. Consider tapering down excessive use of caffeine. Avoid tobacco use prior to bedtime. Avoid alcohol use prior to bedtime. Consider reading "No More Sleepless nights" by Meseret Moyer, Ph.D.  Consider use of online resources including:  http://[employer]/cbt-online-insomnia-treatment.html  [employer]. com  CBT-I  Linda on your Smart Phone. Go! To Sleep by the [facility]. Healthy Diet   The American Heart Association and the [employer] of Cardiology have long recommended a healthy diet for not only patients who are at risk for atherosclerotic cardiovascular disease (ASCVD) but also the general public.  In keeping with this evidence-based recommendation, the "2018 Guideline on the Management of Blood Cholesterol" stresses that a healthy diet should include adequate intake of these essentials:   Vegetables, fruits, and whole grains   Legumes and nuts   Low-fat dairy products   Low-fat poultry (without the skin)   Fish and seafood   Nontropical vegetable oils     The recent guidelines do provide room for cultural food preferences in a healthy diet, but in general, all patients should limit their intake of saturated and avoid all trans fats, sweets, sugar-sweetened beverages, and red meats. Please maintain adequate hydration of at least 2 Liters of water per day, and improve nutrition by decreasing portion sizes, avoiding fried foods, fast foods, inappropriate snacking and overly processed and packaged items with 'added sugars' (whether in drinks or foods), and observing nutritional facts information on any items that are packaged to reduce intake of saturated fats and AVOID trans fats as mentioned above. Again, consume whole foods such as vegetables, higher lean protein intake, and using healthier lifestyle plans such as the Mediterranean diet with healthier fats such as those from seeds, nuts, and using organic extra virgin olive oil or avocado oil in lieu of processed vegetable oils or margarine. Physical Activity   Recommended DAILY exercise for at least 150 minutes of moderate exercise weekly! In addition to a healthy diet, all patients should include regular physical activity in their weekly routines, at moderate to vigorous intensity. Any activity is better than nothing, so if your patients can't meet the recommendation of vigorous activity, moderate-intensity activity can still help them reduce their risk of ASCVD. Below are the American Heart Association's recommendations for physical activity per week (preferably spread throughout the week):      For Overall Cardiovascular Health and Lowering Cholesterol   At least 150 minutes of moderate-intensity physical activity (for example, 30 minutes, 5 days a week), or   At least 75 minutes of vigorous-intensity physical activity (for example, 25 minutes, 3 days a week); or   A combination of moderate- and vigorous-intensity aerobic activity, and   At least 2 days of moderate- to high-intensity muscle-strengthening activities (such as resistance weight training) for additional health benefits    Weight Control   It's important to work with patients to help them reach and maintain a healthy weight (Table 3). You may need to suggest that they adjust their caloric intake to avoid weight gain or, in overweight and obese patients, to promote weight loss. Table 3. Body Mass Index           If you have thoughts of harming yourself or are otherwise in psychological crisis, do not hesitate to contact your [facility] hotline, or 911 or go to the nearest emergency room. Adult Crisis Numbers  Suicide Prevention Hotline - Dial   [facility]: [phone] or [address]: [address] [facility] [address]: [address] [facility] [address]: [phone]  [facility]: [phone] or [phone]  [facility]: [address] [place] [address] [place]: [phone] or [facility] Crisis: [address] [facility] [address]: [phone] (daytime). [phone] (after hours, weekends, holidays)     Child/Adolescent Crisis Numbers   [facility]: [address]: [address] [place], [place]: [phone]   [facility]: [phone]  Please note: Some [facility] do not have a separate number for Child/Adolescent specific crisis. If your county is not listed under Child/Adolescent, please call the adult number for your county     National Talk to Text Line   All Ages - [address]: [phone] or call 989.

## 2023-07-29 ENCOUNTER — HOSPITAL ENCOUNTER (EMERGENCY)
Facility: HOSPITAL | Age: 23
Discharge: HOME | End: 2023-07-29
Attending: EMERGENCY MEDICINE
Payer: COMMERCIAL

## 2023-07-29 ENCOUNTER — APPOINTMENT (EMERGENCY)
Dept: RADIOLOGY | Facility: HOSPITAL | Age: 23
End: 2023-07-29
Payer: COMMERCIAL

## 2023-07-29 VITALS
OXYGEN SATURATION: 99 % | SYSTOLIC BLOOD PRESSURE: 108 MMHG | HEART RATE: 95 BPM | DIASTOLIC BLOOD PRESSURE: 72 MMHG | RESPIRATION RATE: 18 BRPM | TEMPERATURE: 98.9 F

## 2023-07-29 DIAGNOSIS — J40 BRONCHITIS: Primary | ICD-10-CM

## 2023-07-29 DIAGNOSIS — S20.229A BACK CONTUSION: ICD-10-CM

## 2023-07-29 LAB
FLUAV RNA RESP QL NAA+PROBE: NEGATIVE
FLUBV RNA RESP QL NAA+PROBE: NEGATIVE
RSV RNA RESP QL NAA+PROBE: NEGATIVE
SARS-COV-2 RNA RESP QL NAA+PROBE: NEGATIVE

## 2023-07-29 PROCEDURE — 99284 EMERGENCY DEPT VISIT MOD MDM: CPT

## 2023-07-29 PROCEDURE — 72100 X-RAY EXAM L-S SPINE 2/3 VWS: CPT

## 2023-07-29 PROCEDURE — 72040 X-RAY EXAM NECK SPINE 2-3 VW: CPT

## 2023-07-29 PROCEDURE — 0241U HB NFCT DS VIR RESP RNA 4 TRGT: CPT | Performed by: EMERGENCY MEDICINE

## 2023-07-29 PROCEDURE — 99285 EMERGENCY DEPT VISIT HI MDM: CPT | Performed by: EMERGENCY MEDICINE

## 2023-07-29 PROCEDURE — 71046 X-RAY EXAM CHEST 2 VIEWS: CPT

## 2023-07-29 RX ORDER — AZITHROMYCIN 250 MG/1
250 TABLET, FILM COATED ORAL DAILY
Qty: 4 TABLET | Refills: 0 | Status: SHIPPED | OUTPATIENT
Start: 2023-07-29 | End: 2023-08-02

## 2023-07-29 RX ORDER — AZITHROMYCIN 500 MG/1
500 TABLET, FILM COATED ORAL ONCE
Status: COMPLETED | OUTPATIENT
Start: 2023-07-29 | End: 2023-07-29

## 2023-07-29 RX ADMIN — AZITHROMYCIN 500 MG: 500 TABLET, FILM COATED ORAL at 18:29

## 2023-07-29 NOTE — DISCHARGE INSTRUCTIONS
Zithromax daily for the next 5 days to fight infection  Rest  Tylenol or Motrin if needed for back pain  Ice to the area may help  Follow-up with your provider or return if worse

## 2023-07-29 NOTE — ED PROVIDER NOTES
History  Chief Complaint   Patient presents with   • Cough     Pt c/o persistent productive cough x 1 week   • Fall     Pt c/o back pain after falling down approx 4 steps this morning      HPI is a 28-year-old female with 2 complaints, patient reports coughing and congestion. She reports coughing and hacking over the last week and then over the last 24 hours bringing up some sputum production. Denies any acute shortness of breath. Denies any wheezing. She denies any chest pain. She reports feeling like she was having a cold or congestion but now with sputum production she became increasingly concerned about pneumonia. Apparently a family member recently had pneumonia. Patient also reports this morning while she was packing she slipped and fell down 4 steps injuring her back and her neck. Patient complains of pain with range of motion especially with movement of her neck to the left-hand side. Patient denies any fever or chills. She denies any rash. Past medical history of anxiety, depression concussion  Family history noncontributory  Social history, planning a business trip in the next 24 hours, non-smoker    Prior to Admission Medications   Prescriptions Last Dose Informant Patient Reported? Taking?    Dapsone 7.5 % GEL   No No   Sig: Apply 1 Application topically in the morning   Levonorgestrel (MIRENA) 20 MCG/DAY IUD   Yes No   Si each by Intrauterine route once   albuterol (PROVENTIL HFA,VENTOLIN HFA) 90 mcg/act inhaler  Self Yes No   Sig: Inhale 1-2 puffs   dicyclomine (BENTYL) 20 mg tablet   No No   Sig: Take 1 tablet (20 mg total) by mouth every 6 (six) hours   ergocalciferol (VITAMIN D2) 50,000 units   No No   Sig: Take 1 capsule (50,000 Units total) by mouth 2 (two) times a week   hydrOXYzine HCL (ATARAX) 25 mg tablet   No No   Sig: Take 1 tablet (25 mg total) by mouth daily as needed (moderate-severe anxiety)   spironolactone (ALDACTONE) 50 mg tablet   No No   Sig: Take 1 tablet (50 mg total) by mouth 2 (two) times a day   venlafaxine (EFFEXOR-XR) 75 mg 24 hr capsule   No No   Sig: Take 1 capsule (75 mg total) by mouth daily      Facility-Administered Medications: None       Past Medical History:   Diagnosis Date   • Anxiety    • Chronic vaginitis 3/15/2017   • Concussion     Last Assessed: 11/15/2017   • Depression        Past Surgical History:   Procedure Laterality Date   • APPENDECTOMY     • INSERTION OF INTRAUTERINE DEVICE (IUD) N/A        Family History   Problem Relation Age of Onset   • Restless legs syndrome Mother    • Depression Mother    • Depression Father    • Colon cancer Paternal Grandmother    • Uterine cancer Paternal Grandmother    • OCD Sister    • Anxiety disorder Brother    • Hashimoto's thyroiditis Maternal Grandmother    • Breast cancer Neg Hx    • Ovarian cancer Neg Hx    • Cervical cancer Neg Hx      I have reviewed and agree with the history as documented. E-Cigarette/Vaping   • E-Cigarette Use Former User      E-Cigarette/Vaping Substances   • Nicotine Yes    • Flavoring Yes      Social History     Tobacco Use   • Smoking status: Never   • Smokeless tobacco: Never   • Tobacco comments:     Vape - ceased use    Vaping Use   • Vaping Use: Former   • Substances: Nicotine, Flavoring   Substance Use Topics   • Alcohol use: Yes     Comment: socially   • Drug use: No       Review of Systems   Constitutional: Negative for diaphoresis, fatigue and fever. HENT: Positive for congestion. Negative for ear pain, nosebleeds and sore throat. Eyes: Negative for photophobia, pain, discharge and visual disturbance. Respiratory: Positive for cough. Negative for choking, chest tightness, shortness of breath and wheezing. Cardiovascular: Negative for chest pain and palpitations. Gastrointestinal: Negative for abdominal distention, abdominal pain, diarrhea and vomiting. Genitourinary: Negative for dysuria, flank pain and frequency.    Musculoskeletal: Positive for back pain and neck pain. Negative for gait problem and joint swelling. Skin: Negative for color change and rash. Neurological: Negative for dizziness, syncope and headaches. Psychiatric/Behavioral: Negative for behavioral problems and confusion. The patient is not nervous/anxious. All other systems reviewed and are negative. Physical Exam  Physical Exam  Vitals and nursing note reviewed. Constitutional:       Appearance: She is well-developed. HENT:      Head: Normocephalic. Right Ear: External ear normal.      Left Ear: External ear normal.      Nose: Nose normal.   Eyes:      General: Lids are normal.      Pupils: Pupils are equal, round, and reactive to light. Neck:      Comments: There is no midline tenderness, no focal weakness,  Cardiovascular:      Rate and Rhythm: Normal rate and regular rhythm. Pulses: Normal pulses. Heart sounds: Normal heart sounds. Pulmonary:      Effort: Pulmonary effort is normal. No respiratory distress. Breath sounds: Normal breath sounds. No wheezing. Musculoskeletal:         General: No deformity. Normal range of motion. Cervical back: Normal range of motion and neck supple. Comments: There is some mild low back tenderness, some pain with range of motion, no focal weakness The patient can ambulate. Skin:     General: Skin is warm and dry. Neurological:      Mental Status: She is alert and oriented to person, place, and time.          Vital Signs  ED Triage Vitals [07/29/23 1722]   Temperature Pulse Respirations Blood Pressure SpO2   98.9 °F (37.2 °C) 102 16 119/76 100 %      Temp Source Heart Rate Source Patient Position - Orthostatic VS BP Location FiO2 (%)   Temporal Monitor Sitting Left arm --      Pain Score       --           Vitals:    07/29/23 1722 07/29/23 1830   BP: 119/76 108/72   Pulse: 102 95   Patient Position - Orthostatic VS: Sitting          Visual Acuity  Visual Acuity    Flowsheet Row Most Recent Value   L Pupil Size (mm) 3   R Pupil Size (mm) 3          ED Medications  Medications   azithromycin (ZITHROMAX) tablet 500 mg (500 mg Oral Given 7/29/23 1829)       Diagnostic Studies  Results Reviewed     Procedure Component Value Units Date/Time    FLU/RSV/COVID - if FLU/RSV clinically relevant [[id number]]  (Normal) Collected: 07/29/23 1753    Lab Status: Final result Specimen: Nares from Nose Updated: 07/29/23 1845     SARS-CoV-2 Negative     INFLUENZA A PCR Negative     INFLUENZA B PCR Negative     RSV PCR Negative    Narrative:      FOR PEDIATRIC PATIENTS - copy/paste COVID Guidelines URL to browser: [email or web address] [email or web address]    SARS-CoV-2 assay is a Nucleic Acid Amplification assay intended for the  qualitative detection of nucleic acid from SARS-CoV-2 in nasopharyngeal  swabs. Results are for the presumptive identification of SARS-CoV-2 RNA. Positive results are indicative of infection with SARS-CoV-2, the virus  causing COVID-19, but do not rule out bacterial infection or co-infection  with other viruses. Laboratories within the [facility] and its  territories are required to report all positive results to the appropriate  public health authorities. Negative results do not preclude SARS-CoV-2  infection and should not be used as the sole basis for treatment or other  patient management decisions. Negative results must be combined with  clinical observations, patient history, and epidemiological information. This test has not been FDA cleared or approved. This test has been authorized by FDA under an Emergency Use Authorization  (EUA). This test is only authorized for the duration of time the  declaration that circumstances exist justifying the authorization of the  emergency use of an in vitro diagnostic tests for detection of SARS-CoV-2  virus and/or diagnosis of COVID-19 infection under section 564(b)(1) of  the Act, 21 U. S.C. 795UWW-5(J)(0), unless the authorization is terminated  or revoked sooner. The test has been validated but independent review by FDA  and CLIA is pending. Test performed using [id number] GeneXpert: This RT-PCR assay targets N2,  a region unique to SARS-CoV-2. A conserved region in the E-gene was chosen  for pan-Sarbecovirus detection which includes SARS-CoV-2. According to CMS-2020-01-R, this platform meets the definition of high-throughput technology. XR chest pa & lateral    (Results Pending)   XR spine lumbar 2 or 3 views injury    (Results Pending)   XR spine cervical 2 or 3 vw injury    (Results Pending)              Procedures  Procedures         ED Course       COVID influenza and RSV testing were negative the patient [facility] prior to results. X-ray of the cervical spine and lumbar spine showed no fracture. Chest x-ray: Chest x-ray showed a normal cardiac silhouette, no pneumothorax no infiltrates, No sign of pathology, interpreted by me, I was the primary . SBIRT 20yo+    Flowsheet Row Most Recent Value   Initial Alcohol Screen: US AUDIT-C     1. How often do you have a drink containing alcohol? 0 Filed at: 07/29/2023 1818   2. How many drinks containing alcohol do you have on a typical day you are drinking? 0 Filed at: 07/29/2023 1818   3b. FEMALE Any Age, or MALE 65+: How often do you have 4 or more drinks on one occassion? 0 Filed at: 07/29/2023 1818   Audit-C Score 0 Filed at: 07/29/2023 1818   CALVIN: How many times in the past year have you. .. Used an illegal drug or used a prescription medication for non-medical reasons? Never Filed at: 07/29/2023 1818                    Medical Decision Making  Decision making 52-year-old female with 2 complaints, cough congestion with sputum production over the last 24 hours. Cough over the last week. Patient also reports falling and injuring her back and her neck.   Imaging showed no fracture of her neck or her lumbar spine. No focal weakness. Chest x-ray showed no definite pneumonia. Discussed patient consistent with bronchitis, offered antibiotic therapy due to the duration and sputum production she agreed. Patient was okay with treatment with Zithromax. Discussed outpatient treatment and follow-up discussed indications to return. Back contusion: acute illness or injury  Bronchitis: acute illness or injury  Amount and/or Complexity of Data Reviewed  Radiology: ordered. Risk  Prescription drug management. Disposition  Final diagnoses:   Bronchitis   Back contusion     Time reflects when diagnosis was documented in both MDM as applicable and the Disposition within this note     Time User Action Codes Description Comment    7/29/2023  6:18 PM Elmer Meyer Add [J40] Bronchitis     7/29/2023  6:19 PM Lei Lr Back contusion       ED Disposition     ED Disposition   Discharge    Condition   Stable    Date/Time   Sat Jul 29, 2023  6:18 PM    Comment   Josephine Dakin discharge to home/self care.                Follow-up Information     Follow up With Specialties Details Why Contact Info    MAYRA Chapman Nurse Practitioner   [id number] [facility]  [phone]            Discharge Medication List as of 7/29/2023  6:19 PM      START taking these medications    Details   azithromycin (ZITHROMAX) 250 mg tablet Take 1 tablet (250 mg total) by mouth daily for 4 days, Starting Sat 7/29/2023, Until Wed 8/2/2023, Normal         CONTINUE these medications which have NOT CHANGED    Details   albuterol (PROVENTIL HFA,VENTOLIN HFA) 90 mcg/act inhaler Inhale 1-2 puffs, Starting Wed 10/2/2019, Historical Med      Dapsone 7.5 % GEL Apply 1 Application topically in the morning, Starting Thu 6/29/2023, Until Sat 7/29/2023, Normal      dicyclomine (BENTYL) 20 mg tablet Take 1 tablet (20 mg total) by mouth every 6 (six) hours, Starting Thu 7/20/2023, Normal ergocalciferol (VITAMIN D2) 50,000 units Take 1 capsule (50,000 Units total) by mouth 2 (two) times a week, Starting Thu 6/8/2023, Normal      hydrOXYzine HCL (ATARAX) 25 mg tablet Take 1 tablet (25 mg total) by mouth daily as needed (moderate-severe anxiety), Starting Tue 4/18/2023, Until Sat 6/17/2023 at 2359, Normal      Levonorgestrel (MIRENA) 20 MCG/DAY IUD 1 each by Intrauterine route once, Historical Med      spironolactone (ALDACTONE) 50 mg tablet Take 1 tablet (50 mg total) by mouth 2 (two) times a day, Starting Mon 6/19/2023, Until Sun 9/17/2023, Normal      venlafaxine (EFFEXOR-XR) 75 mg 24 hr capsule Take 1 capsule (75 mg total) by mouth daily, Starting Tue 7/25/2023, Until Sun 1/21/2024, Normal             No discharge procedures on file.     PDMP Review     None          ED Provider  Electronically Signed by           Norton Cushing, MD  07/29/23 6052

## 2023-07-31 ENCOUNTER — VBI (OUTPATIENT)
Dept: ADMINISTRATIVE | Facility: OTHER | Age: 23
End: 2023-07-31

## 2023-07-31 NOTE — LETTER
VALUE BASED [facility]  [place] [address]    Date: 08/02/23  [address] [facility] [address]  [place] [address]    Dear Diya Nuñez:                                                                                                                              Thank you for choosing [facility]'s emergency department for care. Your primary care provider wants to make sure that your ongoing medical care is being addressed. If you require follow up care as a result of your emergency department visit, there are a few things the practice would like you to know. As part of the network's continuing commitment to caring for our patients, we have added more same day appointments and have extended office hours to meet your medical needs. After hours, on-call physicians are available via your primary care provider's main office line. We encourage you to contact our office prior to seeking treatment to discuss your symptoms with the medical staff. Together, we can determine the correct course of action. A majority of non-emergent conditions such as: common cold, flu-like symptoms, fevers, strains/sprains, dislocations, minor burns, cuts and animal bites can be treated at [facility] facilities. Diagnostic testing is available at some sites. Of course, if you are experiencing a life threatening medical emergency call 911 or proceed directly to the nearest emergency room. Your nearest [facility] facility is conveniently located at:    [place] [address]  [address] [facility] [address] offered at most [address] [facility] your spot online at [email or web address] or on the [address] [place] [address] Drive    Sincerely,    VALUE [facility]ED VIR  No information on file.

## 2023-07-31 NOTE — TELEPHONE ENCOUNTER
Michael Jon    ED Visit Information     Ed visit date: 7-29-23  Diagnosis Description: cough fall  In Network? Yes [id number]  Discharge status: Home  Discharged with meds ? Yes  Number of ED visits to date: 1  ED Severity:4     Outreach Information    Outreach successful: No 3  Date letter mailed:8-2-23  Date Finalized:8-2-23    Care Coordination    Follow up appointment with pcp: no none  Transportation issues ?  No    Value Consolidated Eliud    Outreach type: 3 Day Outreach  07/31/2023 02:25 PM EDT by Harley Winston MA 07/31/2023 02:25 PM EDT by DAYNA Onofre (Self) [phone] (Mobile)[phone] (Mobile)  [phone] (Mobile)   - No Answer/BusyCommunicated -   08/01/2023 02:27 PM EDT by Harley Winston MA 08/01/2023 02:27 PM EDT by DAYNA Onofre (Self) [phone] (Mobile)[phone] (Mobile)  [phone] (Mobile)   - No Answer/BusyCommunicated -   08/02/2023 02:08 PM EDT by Harley Winston MA 08/02/2023 02:08 PM EDT by DAYNA Onofre (Self) [phone] (Mobile)[phone] (Mobile)  [phone] (Mobile) Remove  - No Answer/BusyCommunicated - letter sent

## 2023-08-01 ENCOUNTER — TELEPHONE (OUTPATIENT)
Dept: GASTROENTEROLOGY | Facility: CLINIC | Age: 23
End: 2023-08-01

## 2023-08-01 DIAGNOSIS — R19.8 CHANGE IN BOWEL FUNCTION: ICD-10-CM

## 2023-08-01 RX ORDER — DICYCLOMINE HCL 20 MG
20 TABLET ORAL EVERY 6 HOURS
Qty: 60 TABLET | Refills: 0 | Status: CANCELLED | OUTPATIENT
Start: 2023-08-01

## 2023-08-01 NOTE — TELEPHONE ENCOUNTER
I called patient, no answer, left detailed message that she needs to follow up in office since last visit 4/1/21. Medication was just refilled 7/20/23 for 60 tablets to take q 6 hours.

## 2023-08-11 ENCOUNTER — TELEMEDICINE (OUTPATIENT)
Dept: BEHAVIORAL/MENTAL HEALTH CLINIC | Facility: CLINIC | Age: 23
End: 2023-08-11

## 2023-08-11 DIAGNOSIS — F41.1 GENERALIZED ANXIETY DISORDER: ICD-10-CM

## 2023-08-11 DIAGNOSIS — F32.1 CURRENT MODERATE EPISODE OF MAJOR DEPRESSIVE DISORDER WITHOUT PRIOR EPISODE (HCC): Primary | ICD-10-CM

## 2023-08-11 NOTE — PSYCH
Behavioral Health Psychotherapy Progress Note    Psychotherapy Provided: Individual Psychotherapy     1. Current moderate episode of major depressive disorder without prior episode ([address])        2. Generalized anxiety disorder            Goals addressed in session: goal 1, want to balance out my time with work, school and time for myself and not feel guilty for taking time with myself. DATA:  URIAH met with Brenda  for session. Since last session she went on vacation with her family and boyfriend, was on a business trip for a week and just got home 3 days ago. Her boyfriend started psychotherapy 3 sessions ago and  used to be a heavy marijuana smoker. She came back after a week and he has completely changed. "Her head is spinning."   He stopped smoking, he bought a truck on his own, after doing the research, making sure he could afford it and talking to her mother, he went over to dinner at his family's house, her parent's house, looked into getting his CDL and applied to college that he had talked about a while ago. The things listed are "all things that I wanted him to do."  She was worried that he was manic although not dx with bipolar. Reviewed the criteria for sarika and from what she reports he does not meet the criteria. Discussed how she can help and support him to continue to make progress and what to look out for. Unable to address tx plan goals due to time. During this session, this clinician used the following therapeutic modalities: Client-centered Therapy, Cognitive Behavioral Therapy, Cognitive Processing Therapy, Solution-Focused Therapy and Supportive Psychotherapy    Substance Abuse was not addressed during this session.  If the client is diagnosed with a co-occurring substance use disorder, please indicate any changes in the frequency or amount of use: na. Stage of change for addressing substance use diagnoses: No substance use/Not applicable    ASSESSMENT:  Nilson Smith presents with a Euthymic/ normal mood. her affect is Normal range and intensity, which is congruent, with her mood and the content of the session. The client has made progress on their goals. Bobo Lantigua presents with a none risk of suicide, none risk of self-harm, and none risk of harm to others. For any risk assessment that surpasses a "low" rating, a safety plan must be developed. A safety plan was indicated: no  If yes, describe in detail     PLAN: Between sessions, Brenda Lincoln Gabbyok will begin addressing new tx plan goals. At the next session, the therapist will use Client-centered Therapy, Cognitive Behavioral Therapy, Cognitive Processing Therapy, Solution-Focused Therapy and Supportive Psychotherapy to address tx plan goals and new issues that arise since last session. .    Behavioral Health Treatment Plan and Discharge Planning: Bobo Lantigua is aware of and agrees to continue to work on their treatment plan. They have identified and are working toward their discharge goals. yes    Visit start and stop times:    08/11/23  Start Time: 5003  Stop Time: 1700  Total Visit Time: 47 minutes    Virtual Regular Visit    Verification of patient location:    Patient is located at Home in the following state in which I hold an active license [place]      Assessment/Plan:    Problem List Items Addressed This Visit    None      Goals addressed in session: Goal 1          Reason for visit is   No chief complaint on file. Encounter provider Ritesh Yost    Provider located at [address] [place] [id number] [phone]      Recent Visits  No visits were found meeting these conditions. Showing recent visits within past 7 days and meeting all other requirements  Future Appointments  No visits were found meeting these conditions.   Showing future appointments within next 150 days and meeting all other requirements The patient was identified by name and date of birth. Gloria Capellan was informed that this is a telemedicine visit and that the visit is being conducted throughthe Ever[employer] platform. She agrees to proceed. .  My office door was closed. No one else was in the room. She acknowledged consent and understanding of privacy and security of the video platform. The patient has agreed to participate and understands they can discontinue the visit at any time. Patient is aware this is a billable service. Subjective  Brenda Turk is a 26 y.o. female  . HPI     Past Medical History:   Diagnosis Date   • Anxiety    • Chronic vaginitis 3/15/2017   • Concussion     Last Assessed: 11/15/2017   • Depression        Past Surgical History:   Procedure Laterality Date   • APPENDECTOMY     • INSERTION OF INTRAUTERINE DEVICE (IUD) N/A        Current Outpatient Medications   Medication Sig Dispense Refill   • albuterol (PROVENTIL HFA,VENTOLIN HFA) 90 mcg/act inhaler Inhale 1-2 puffs  0   • Dapsone 7.5 % GEL Apply 1 Application topically in the morning 60 g 2   • dicyclomine (BENTYL) 20 mg tablet Take 1 tablet (20 mg total) by mouth every 6 (six) hours 60 tablet 0   • ergocalciferol (VITAMIN D2) 50,000 units Take 1 capsule (50,000 Units total) by mouth 2 (two) times a week 24 capsule 0   • hydrOXYzine HCL (ATARAX) 25 mg tablet Take 1 tablet (25 mg total) by mouth daily as needed (moderate-severe anxiety) 30 tablet 1   • Levonorgestrel (MIRENA) 20 MCG/DAY IUD 1 each by Intrauterine route once     • spironolactone (ALDACTONE) 50 mg tablet Take 1 tablet (50 mg total) by mouth 2 (two) times a day 180 tablet 2   • venlafaxine (EFFEXOR-XR) 75 mg 24 hr capsule Take 1 capsule (75 mg total) by mouth daily 90 capsule 1     No current facility-administered medications for this visit. No Known Allergies    Review of Systems    Video Exam    There were no vitals filed for this visit.     Physical Exam

## 2023-08-11 NOTE — PSYCH
Treatment Plan Tracking    Treatment Plan not completed within required time limits due to: Gloria Capellan presented with emotional/behavioral issues that required clinical intervention.

## 2023-08-22 ENCOUNTER — TELEMEDICINE (OUTPATIENT)
Dept: BEHAVIORAL/MENTAL HEALTH CLINIC | Facility: CLINIC | Age: 23
End: 2023-08-22
Payer: COMMERCIAL

## 2023-08-22 DIAGNOSIS — F41.1 GENERALIZED ANXIETY DISORDER: ICD-10-CM

## 2023-08-22 DIAGNOSIS — E55.9 VITAMIN D DEFICIENCY: ICD-10-CM

## 2023-08-22 DIAGNOSIS — F32.1 CURRENT MODERATE EPISODE OF MAJOR DEPRESSIVE DISORDER WITHOUT PRIOR EPISODE (HCC): Primary | ICD-10-CM

## 2023-08-22 PROCEDURE — 90834 PSYTX W PT 45 MINUTES: CPT | Performed by: SOCIAL WORKER

## 2023-08-22 RX ORDER — ERGOCALCIFEROL 1.25 MG/1
50000 CAPSULE ORAL 2 TIMES WEEKLY
Qty: 24 CAPSULE | Refills: 0 | Status: SHIPPED | OUTPATIENT
Start: 2023-08-24

## 2023-08-22 NOTE — BH CRISIS PLAN
Client Name: Trent Gonzalez       Client YOB: 2000  : 2000    Treatment Team (include name and contact information):     Psychotherapist: Lisette Cornejo LCSW    Psychiatrist: Dr. Eloy Ricketts of information completed: [facility] Provider  Arik Bhatti, Marce Swift  [address] [place] [address]  [phone]    Type of Plan   * Child plans (children 15 yo and younger) must be completed and signed by the child's legal guardian   * Plans for all individuals 15 yo and above must be signed by the client.      Plan Type: adolescent/adult (15 and over) Initial      My Personal Strengths are (in the client's own words):  Hard working, kind    The stressors and triggers that may put me at risk are:  loneliness, feeling a lack of control and other (describe) hostilty towards her    Coping skills I can use to keep myself calm and safe:  Call a friend or family member and Increased contact with professional supports    Coping skills/supports I can use to maintain abstinence from substance use:   na    The people that provide me with help and support: (Include name, contact, and how they can help)   Support person #1: dad and mom     * Phone number: has #    * How can they help me? talking   Support person #2:friend Lana Moarles    * Phone number: has #    * How can they help me? talking     Support person #3: boyfriend Genevacindy Palm    * Phone number: live together    * How can they help me? talking    In the past, the following has helped me in times of crisis:    Being with other people, Taking medications, Calling a friend and Calling a family member      If it is an emergency and you need immediate help, call     If there is a possibility of danger to yourself or others, call the following crisis hotline resources:     Adult Crisis Numbers  Suicide Prevention Hotline - Dial   [facility]: [address]: [address]: [phone]  [place]: [phone]  [address] [place] [address]: [phone]  [facility]: [address]: [address]: [phone] (daytime). [phone] (after hours, weekends, holidays)     Child/Adolescent Crisis Numbers   [facility]: [address]: [phone]   [place] Mel[place]: [phone]   [address] [place] [address]: [phone]    Please note: Some [facility] do not have a separate number for Child/Adolescent specific crisis. If your county is not listed under Child/Adolescent, please call the adult number for your county     National Talk to Text Line   All Ages - [phone]    In the event your feelings become unmanageable, and you cannot reach your support system, you will call 911 immediately or go to the nearest hospital emergency room.

## 2023-08-22 NOTE — BH TREATMENT PLAN
Outpatient Behavioral Health Psychotherapy Treatment Plan    Laurie Gramajo  2000     Date of Initial Psychotherapy Assessment: 6/16/22  Date of Current Treatment Plan: 08/22/23  Treatment Plan Target Date: 2/24  Treatment Plan Expiration Date: 2/24    Diagnosis:   No diagnosis found. Area(s) of Need: My anxiety spirals out of control. I loop myself. Long Term Goal 1:   I want to manage my anxiety better. Stage of Change: Preparation    Target Date for completion: 2/24     Anticipated therapeutic modalities: CBT     People identified to complete this goal: Brenda      Objective 1:  I will talk to other people. I will take break from what is stressing me out. I will take my meds as prescribed. I will think about using deep breathing and or grounding techs. I will try to rational out the problem that I am anxious about. I will think about the worst possible thing that could happen, the best possible thing that could happen and the likely thing that probably will happen. Long Term Goal 2 (in the client's own words):     Stage of Change: Preparation    Target Date for completion:      Anticipated therapeutic modalities:      People identified to complete this goal:       Objective 1: (identify the means of measuring success in meeting the objective):       Objective 2: (identify the means of measuring success in meeting the objective): Long Term Goal 3 (in the client's own words):     Stage of Change: Preparation    Target Date for completion:      Anticipated therapeutic modalities:      People identified to complete this goal:       Objective 1: (identify the means of measuring success in meeting the objective):      Objective 2: (identify the means of measuring success in meeting the objective):       I am currently under the care of a [facility] psychiatric provider: yes    My [facility] psychiatric provider is: Dr. Maryruth Cranker    I am currently taking psychiatric medications: Yes, as prescribed    I feel that I will be ready for discharge from mental health care when I reach the following (measurable goal/objective): When I meet all my goals and have no further goals to address on a tx plan. For children and adults who have a legal guardian:   Has there been any change to custody orders and/or guardianship status? NA. If yes, attach updated documentation. I have updated my Crisis Plan and have been offered a copy of this plan    [address]: Diagnosis and Treatment Plan explained to [facility] acknowledges an understanding of their diagnosis. Ulysses Degroot agrees to this treatment plan.     I have been offered a copy of this Treatment Plan. yes

## 2023-08-22 NOTE — PSYCH
Behavioral Health Psychotherapy Progress Note    Psychotherapy Provided: Individual Psychotherapy     1. Current moderate episode of major depressive disorder without prior episode ([address])        2. Generalized anxiety disorder            Goals addressed in session: goal 1, want to balance out my time with work, school and time for myself and not feel guilty for taking time with myself. DATA:  URIAH met with Brenda  for session. College starts next week and it is her last semester. She is anxious with the new semester starting. This semester is a lot lighter than what she is used to but "her brain is freaking out about it."    She had an issue at work where she made a mistake. "She doesn't make mistakes."  She was very concerned about it and called her boss. He was not concerned at all. She still is ruminating about it. Developed tx plan and crisis plan updates. During this session, this clinician used the following therapeutic modalities: Client-centered Therapy, Cognitive Behavioral Therapy, Cognitive Processing Therapy, Solution-Focused Therapy and Supportive Psychotherapy    Substance Abuse was not addressed during this session. If the client is diagnosed with a co-occurring substance use disorder, please indicate any changes in the frequency or amount of use: na. Stage of change for addressing substance use diagnoses: No substance use/Not applicable    ASSESSMENT:  Ya Calles presents with a Euthymic/ normal mood. her affect is Normal range and intensity, which is congruent, with her mood and the content of the session. The client has made progress on their goals. Ya Calles presents with a none risk of suicide, none risk of self-harm, and none risk of harm to others. For any risk assessment that surpasses a "low" rating, a safety plan must be developed.     A safety plan was indicated: no  If yes, describe in detail     PLAN: Between sessions, Brenda Oumou Grapes will begin addressing new tx plan goals. At the next session, the therapist will use Client-centered Therapy, Cognitive Behavioral Therapy, Cognitive Processing Therapy, Solution-Focused Therapy and Supportive Psychotherapy to address tx plan goals and new issues that arise since last session. .    Behavioral Health Treatment Plan and Discharge Planning: Edrie Phoenix is aware of and agrees to continue to work on their treatment plan. They have identified and are working toward their discharge goals. yes    Visit start and stop times:    08/22/23  Start Time: 1615  Stop Time: 1700  Total Visit Time: 45 minutes    Virtual Regular Visit    Verification of patient location:    Patient is located at Home in the following state in which I hold an active license [place]      Assessment/Plan:    Problem List Items Addressed This Visit    None      Goals addressed in session: Goal 1          Reason for visit is   No chief complaint on file. Encounter provider Benita Kay    Provider located at [address] [place] [id number] [phone]      Recent Visits  No visits were found meeting these conditions. Showing recent visits within past 7 days and meeting all other requirements  Future Appointments  No visits were found meeting these conditions. Showing future appointments within next 150 days and meeting all other requirements       The patient was identified by name and date of birth. Edrie Phoenix was informed that this is a telemedicine visit and that the visit is being conducted throughthe TIKI.VN platform. She agrees to proceed. .  My office door was closed. No one else was in the room. She acknowledged consent and understanding of privacy and security of the video platform. The patient has agreed to participate and understands they can discontinue the visit at any time. Patient is aware this is a billable service. Subjective  Brenda Rubalcava is a 26 y.o. female  . HPI     Past Medical History:   Diagnosis Date   • Anxiety    • Chronic vaginitis 3/15/2017   • Concussion     Last Assessed: 11/15/2017   • Depression        Past Surgical History:   Procedure Laterality Date   • APPENDECTOMY     • INSERTION OF INTRAUTERINE DEVICE (IUD) N/A        Current Outpatient Medications   Medication Sig Dispense Refill   • albuterol (PROVENTIL HFA,VENTOLIN HFA) 90 mcg/act inhaler Inhale 1-2 puffs  0   • Dapsone 7.5 % GEL Apply 1 Application topically in the morning 60 g 2   • dicyclomine (BENTYL) 20 mg tablet Take 1 tablet (20 mg total) by mouth every 6 (six) hours 60 tablet 0   • [START ON 8/24/2023] ergocalciferol (VITAMIN D2) 50,000 units Take 1 capsule (50,000 Units total) by mouth 2 (two) times a week 24 capsule 0   • hydrOXYzine HCL (ATARAX) 25 mg tablet Take 1 tablet (25 mg total) by mouth daily as needed (moderate-severe anxiety) 30 tablet 1   • Levonorgestrel (MIRENA) 20 MCG/DAY IUD 1 each by Intrauterine route once     • spironolactone (ALDACTONE) 50 mg tablet Take 1 tablet (50 mg total) by mouth 2 (two) times a day 180 tablet 2   • venlafaxine (EFFEXOR-XR) 75 mg 24 hr capsule Take 1 capsule (75 mg total) by mouth daily 90 capsule 1     No current facility-administered medications for this visit. No Known Allergies    Review of Systems    Video Exam    There were no vitals filed for this visit.     Physical Exam

## 2023-08-23 ENCOUNTER — TELEMEDICINE (OUTPATIENT)
Age: 23
End: 2023-08-23
Payer: COMMERCIAL

## 2023-08-23 DIAGNOSIS — L70.0 ACNE VULGARIS: Primary | ICD-10-CM

## 2023-08-23 PROCEDURE — 99214 OFFICE O/P EST MOD 30 MIN: CPT | Performed by: DERMATOLOGY

## 2023-08-23 RX ORDER — DOXYCYCLINE HYCLATE 20 MG
20 TABLET ORAL 2 TIMES DAILY
Qty: 180 TABLET | Refills: 0 | Status: SHIPPED | OUTPATIENT
Start: 2023-08-23 | End: 2023-11-21

## 2023-08-23 NOTE — PATIENT INSTRUCTIONS
Topical medications:                  Morning: Continue Dapsone 7.5% cream topically once a day to face until told otherwise   Night: Continue Azelaic acid compounded cream withTretinoin once a day at night topically to face until told otherwise     Oral medications:                                                     Morning and Night: Continue Spironolactone 50 mg by mouth twice a day  After meal and full glass of water  Morning and Night; Start Doxycycline 20 mg by mouth twice a day after meal and full glass of water         Continue gentle skin care   Accutane discussed with risks and benefits  Reassured female patients will need to be on 2 forms of birth control   Negative pregnancy test is required at registration then repeated 30 days later   Accutane course is for 6 months with monthly pregnancy test prior to getting medication   Patient will start Accutane in the fall. Will call to schedule nurse visit for November to fill out I-pledge forms and obtain pregnancy test   Labs ordered       REMEMBER:  Always take your acne pills with lots of water! A pill stuck in your throat can cause significant burning and irritation. Drink a full glass of water to ensure the pill gets into your stomach. Avoid “popping” a pill right before bed, and stay upright for at least 1 hour after taking a pill.

## 2023-08-23 NOTE — PROGRESS NOTES
Virtual Regular Visit    Verification of patient location:    Patient is located at Other in the following state in which I hold an active license [place]      Assessment/Plan:    Problem List Items Addressed This Visit        Musculoskeletal and Integument    Acne - Primary    Relevant Medications    doxycycline (PERIOSTAT) 20 MG tablet    Other Relevant Orders    Hepatic function panel    Cholesterol, total    Triglycerides            Reason for visit is No chief complaint on file. Encounter provider David Barroso MD    Provider located at [address] [place] [id number] [phone]      Recent Visits  No visits were found meeting these conditions. Showing recent visits within past 7 days and meeting all other requirements  Today's Visits  Date Type Provider Dept   08/23/23 Telemedicine David Barroso MD [facility]   Showing today's visits and meeting all other requirements  Future Appointments  No visits were found meeting these conditions. Showing future appointments within next 150 days and meeting all other requirements       The patient was identified by name and date of birth. Kiki Burgos was informed that this is a telemedicine visit and that the visit is being conducted through the [employer]. She agrees to proceed. .  My office door was closed. The patient was notified the following individuals were present in the room Jose CRESPO . She acknowledged consent and understanding of privacy and security of the video platform. The patient has agreed to participate and understands they can discontinue the visit at any time. Patient is aware this is a billable service. Subjective  Brenda Estrada is a 26 y.o. female following up on acne  .       HPI     Past Medical History:   Diagnosis Date   • Anxiety    • Chronic vaginitis 3/15/2017   • Concussion     Last Assessed: 11/15/2017 • Depression        Past Surgical History:   Procedure Laterality Date   • APPENDECTOMY     • INSERTION OF INTRAUTERINE DEVICE (IUD) N/A        Current Outpatient Medications   Medication Sig Dispense Refill   • albuterol (PROVENTIL HFA,VENTOLIN HFA) 90 mcg/act inhaler Inhale 1-2 puffs  0   • dicyclomine (BENTYL) 20 mg tablet Take 1 tablet (20 mg total) by mouth every 6 (six) hours 60 tablet 0   • doxycycline (PERIOSTAT) 20 MG tablet Take 1 tablet (20 mg total) by mouth 2 (two) times a day 180 tablet 0   • [START ON 8/24/2023] ergocalciferol (VITAMIN D2) 50,000 units Take 1 capsule (50,000 Units total) by mouth 2 (two) times a week 24 capsule 0   • Levonorgestrel (MIRENA) 20 MCG/DAY IUD 1 each by Intrauterine route once     • spironolactone (ALDACTONE) 50 mg tablet Take 1 tablet (50 mg total) by mouth 2 (two) times a day 180 tablet 2   • venlafaxine (EFFEXOR-XR) 75 mg 24 hr capsule Take 1 capsule (75 mg total) by mouth daily 90 capsule 1   • Dapsone 7.5 % GEL Apply 1 Application topically in the morning 60 g 2   • hydrOXYzine HCL (ATARAX) 25 mg tablet Take 1 tablet (25 mg total) by mouth daily as needed (moderate-severe anxiety) 30 tablet 1     No current facility-administered medications for this visit. No Known Allergies    Review of Systems    Video Exam    There were no vitals filed for this visit. Physical Exam     Visit Time  Total Visit Duration: 9 minutes             [facility] Dermatology Clinic Note     Patient Name: Bam Hamilton  Encounter Date: 08/23/2023     Have you been cared for by a [facility] Dermatologist in the last 3 years and, if so, which description applies to you? Yes. I have been here within the last 3 years, and my medical history has NOT changed since that time. I am FEMALE/of child-bearing potential.    REVIEW OF SYSTEMS:  Have you recently had or currently have any of the following? No changes in my recent health.    PAST MEDICAL HISTORY:  Have you personally ever had or currently have any of the following? If "YES," then please provide more detail. No changes in my medical history. FAMILY HISTORY:  Any "first degree relatives" (parent, brother, sister, or child) with the following? No changes in my family's known health. PATIENT EXPERIENCE:    Do you want the Dermatologist to perform a COMPLETE skin exam today including a clinical examination under the "bra and underwear" areas? NO  If necessary, do we have your permission to call and leave a detailed message on your Preferred Phone number that includes your specific medical information? Yes      No Known Allergies   Current Outpatient Medications:   •  albuterol (PROVENTIL HFA,VENTOLIN HFA) 90 mcg/act inhaler, Inhale 1-2 puffs, Disp: , Rfl: 0  •  Dapsone 7.5 % GEL, Apply 1 Application topically in the morning, Disp: 60 g, Rfl: 2  •  dicyclomine (BENTYL) 20 mg tablet, Take 1 tablet (20 mg total) by mouth every 6 (six) hours, Disp: 60 tablet, Rfl: 0  •  [START ON 8/24/2023] ergocalciferol (VITAMIN D2) 50,000 units, Take 1 capsule (50,000 Units total) by mouth 2 (two) times a week, Disp: 24 capsule, Rfl: 0  •  hydrOXYzine HCL (ATARAX) 25 mg tablet, Take 1 tablet (25 mg total) by mouth daily as needed (moderate-severe anxiety), Disp: 30 tablet, Rfl: 1  •  Levonorgestrel (MIRENA) 20 MCG/DAY IUD, 1 each by Intrauterine route once, Disp: , Rfl:   •  spironolactone (ALDACTONE) 50 mg tablet, Take 1 tablet (50 mg total) by mouth 2 (two) times a day, Disp: 180 tablet, Rfl: 2  •  venlafaxine (EFFEXOR-XR) 75 mg 24 hr capsule, Take 1 capsule (75 mg total) by mouth daily, Disp: 90 capsule, Rfl: 1          Whom besides the patient is providing clinical information about today's encounter? NO ADDITIONAL HISTORIAN (patient alone provided history)    Physical Exam and Assessment/Plan by Diagnosis:    ACNE VULGARIS ("COMMON ACNE")    Physical Exam:  Anatomic Location Affected:   Face   Morphological Description:  Open/Closed Comedones:  Few ("Mild")  Inflammatory Papules/Pustules:  Several ("Moderate")  Nodules:  Several ("Moderate")  Scarring:  Few ("Mild")  Excoriations:  Few ("Mild")  Local Skin Redness/Erythema:  Several ("Moderate")  Local Skin Dryness/Scaling:  Rare ("Almost Clear")  Local Skin Dyspigmentation:  Rare ("Almost Clear")      Additional History of Present Condition:  Previous visit prescribed dapsone 7.5% cream daily, azelaic acid with tretinoin at night and spironolactone 50 bid. She states there has been a slight improvement. She states she is much better than before but she does get constant breakouts that will heal and then later returned     Treatment plan:Based on a thorough discussion of this condition and the management approach to it (including a comprehensive discussion of the known risks, side effects and potential benefits of treatment), the patient (family) agrees to implement the following specific plan:     Topical medications:                  Morning: Continue Dapsone 7.5% cream topically once a day to face until told otherwise   Night: Continue Azelaic acid compounded cream withTretinoin once a day at night topically to face until told otherwise     Oral medications:                                                     Morning and Night: Continue Spironolactone 50 mg by mouth twice a day  After meal and full glass of water  Morning and Night; Start Doxycycline 20 mg by mouth twice a day after meal and full glass of water         Continue gentle skin care   Accutane discussed with risks and benefits  Reassured female patients will need to be on 2 forms of birth control   Negative pregnancy test is required at registration then repeated 30 days later   Accutane course is for 6 months with monthly pregnancy test prior to getting medication   Patient will start Accutane in the fall.    Will call to schedule nurse visit for November to fill out I-pledge forms and obtain pregnancy test   Labs ordered REMEMBER:  Always take your acne pills with lots of water! A pill stuck in your throat can cause significant burning and irritation. Drink a full glass of water to ensure the pill gets into your stomach. Avoid “popping” a pill right before bed, and stay upright for at least 1 hour after taking a pill.       Scribe Attestation    I,:  Jeannie Mayberry am acting as a scribe while in the presence of the attending physician.:       I,:  Carl Mchugh MD personally performed the services described in this documentation    as scribed in my presence.:

## 2023-09-12 ENCOUNTER — TELEPHONE (OUTPATIENT)
Age: 23
End: 2023-09-12

## 2023-09-12 NOTE — TELEPHONE ENCOUNTER
----- Message from Jeannie Mayberry sent at 8/23/2023 10:26 AM EDT -----  Schedule nurse visit for 11/2023 for Accutane consent

## 2023-09-12 NOTE — TELEPHONE ENCOUNTER
Called and spoke with melissa beth scheduled for nurse visit 11/1/2023 [place] at [address] her to give us a call back to change  if that does not work for her

## 2023-09-13 ENCOUNTER — TELEMEDICINE (OUTPATIENT)
Dept: BEHAVIORAL/MENTAL HEALTH CLINIC | Facility: CLINIC | Age: 23
End: 2023-09-13
Payer: COMMERCIAL

## 2023-09-13 DIAGNOSIS — F41.1 GENERALIZED ANXIETY DISORDER: Primary | ICD-10-CM

## 2023-09-13 DIAGNOSIS — F32.1 CURRENT MODERATE EPISODE OF MAJOR DEPRESSIVE DISORDER WITHOUT PRIOR EPISODE (HCC): ICD-10-CM

## 2023-09-13 PROCEDURE — 90834 PSYTX W PT 45 MINUTES: CPT | Performed by: SOCIAL WORKER

## 2023-09-13 NOTE — PSYCH
Behavioral Health Psychotherapy Progress Note    Psychotherapy Provided: Individual Psychotherapy     1. Generalized anxiety disorder        2. Current moderate episode of major depressive disorder without prior episode ([address])            Goals addressed in session: goal 1, want to balance out my time with work, school and time for myself and not feel guilty for taking time with myself. DATA:  URIAH met with Brenda  for session. Since last seen her college class started. "It is a lighter semester" and it is her last semester prior to graduation. 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 Her boyfriend is still doing good with not smoking and not being consumed with video games and spending more time with her. Discussed friends which she is spending more time with her friends. Things with family is good. Appears she is beginning to balance everything but appears to be taking couple little things and turning them into big things to worry about. Discussed coping skills for this. During this session, this clinician used the following therapeutic modalities: Client-centered Therapy, Cognitive Behavioral Therapy, Cognitive Processing Therapy, Solution-Focused Therapy and Supportive Psychotherapy    Substance Abuse was not addressed during this session. If the client is diagnosed with a co-occurring substance use disorder, please indicate any changes in the frequency or amount of use: na. Stage of change for addressing substance use diagnoses: No substance use/Not applicable    ASSESSMENT:  Clay Tang presents with a Euthymic/ normal mood. her affect is Normal range and intensity, which is congruent, with her mood and the content of the session. The client has made progress on their goals. Clay Tang presents with a none risk of suicide, none risk of self-harm, and none risk of harm to others.     For any risk assessment that surpasses a "low" rating, a safety plan must be developed. A safety plan was indicated: no  If yes, describe in detail     PLAN: Between sessions, Brenda Rosas will begin addressing new tx plan goals. At the next session, the therapist will use Client-centered Therapy, Cognitive Behavioral Therapy, Cognitive Processing Therapy, Solution-Focused Therapy and Supportive Psychotherapy to address tx plan goals and new issues that arise since last session. .    Behavioral Health Treatment Plan and Discharge Planning: Ellen Flood is aware of and agrees to continue to work on their treatment plan. They have identified and are working toward their discharge goals. yes    Visit start and stop times:    09/13/23  Start Time: 1713  Stop Time: 1800  Total Visit Time: 47 minutes    Virtual Regular Visit    Verification of patient location:    Patient is located at Home in the following state in which I hold an active license [place]      Assessment/Plan:    Problem List Items Addressed This Visit    None      Goals addressed in session: Goal 1          Reason for visit is   No chief complaint on file. Encounter provider Calista Feliciano    Provider located at [address] [place] [id number] [phone]      Recent Visits  No visits were found meeting these conditions. Showing recent visits within past 7 days and meeting all other requirements  Today's Visits  Date Type Provider Dept   09/13/23 [id number] Christelle Swift today's visits and meeting all other requirements  Future Appointments  No visits were found meeting these conditions. Showing future appointments within next 150 days and meeting all other requirements       The patient was identified by name and date of birth.  Ellen Flood was informed that this is a telemedicine visit and [facility] She agrees to proceed. .  My office door was closed. No one else was in the room. She acknowledged consent and understanding of privacy and security of the video platform. The patient has agreed to participate and understands they can discontinue the visit at any time. Patient is aware this is a billable service. Subjective  Brenda Henriquez is a 26 y.o. female  . HPI     Past Medical History:   Diagnosis Date   • Anxiety    • Chronic vaginitis 3/15/2017   • Concussion     Last Assessed: 11/15/2017   • Depression        Past Surgical History:   Procedure Laterality Date   • APPENDECTOMY     • INSERTION OF INTRAUTERINE DEVICE (IUD) N/A        Current Outpatient Medications   Medication Sig Dispense Refill   • albuterol (PROVENTIL HFA,VENTOLIN HFA) 90 mcg/act inhaler Inhale 1-2 puffs  0   • Dapsone 7.5 % GEL Apply 1 Application topically in the morning 60 g 2   • dicyclomine (BENTYL) 20 mg tablet Take 1 tablet (20 mg total) by mouth every 6 (six) hours 60 tablet 0   • doxycycline (PERIOSTAT) 20 MG tablet Take 1 tablet (20 mg total) by mouth 2 (two) times a day 180 tablet 0   • ergocalciferol (VITAMIN D2) 50,000 units Take 1 capsule (50,000 Units total) by mouth 2 (two) times a week 24 capsule 0   • hydrOXYzine HCL (ATARAX) 25 mg tablet Take 1 tablet (25 mg total) by mouth daily as needed (moderate-severe anxiety) 30 tablet 1   • Levonorgestrel (MIRENA) 20 MCG/DAY IUD 1 each by Intrauterine route once     • spironolactone (ALDACTONE) 50 mg tablet Take 1 tablet (50 mg total) by mouth 2 (two) times a day 180 tablet 2   • venlafaxine (EFFEXOR-XR) 75 mg 24 hr capsule Take 1 capsule (75 mg total) by mouth daily 90 capsule 1     No current facility-administered medications for this visit. No Known Allergies    Review of Systems    Video Exam    There were no vitals filed for this visit.     Physical Exam

## 2023-09-18 ENCOUNTER — APPOINTMENT (EMERGENCY)
Dept: CT IMAGING | Facility: HOSPITAL | Age: 23
End: 2023-09-18
Payer: COMMERCIAL

## 2023-09-18 ENCOUNTER — HOSPITAL ENCOUNTER (EMERGENCY)
Facility: HOSPITAL | Age: 23
Discharge: HOME | End: 2023-09-18
Attending: EMERGENCY MEDICINE
Payer: COMMERCIAL

## 2023-09-18 VITALS
OXYGEN SATURATION: 98 % | RESPIRATION RATE: 18 BRPM | HEART RATE: 74 BPM | DIASTOLIC BLOOD PRESSURE: 68 MMHG | TEMPERATURE: 97.8 F | SYSTOLIC BLOOD PRESSURE: 119 MMHG

## 2023-09-18 DIAGNOSIS — S16.1XXA STRAIN OF NECK MUSCLE, INITIAL ENCOUNTER: ICD-10-CM

## 2023-09-18 DIAGNOSIS — S09.90XA CLOSED HEAD INJURY, INITIAL ENCOUNTER: Primary | ICD-10-CM

## 2023-09-18 PROCEDURE — 72125 CT NECK SPINE W/O DYE: CPT

## 2023-09-18 PROCEDURE — 70450 CT HEAD/BRAIN W/O DYE: CPT

## 2023-09-18 PROCEDURE — 99285 EMERGENCY DEPT VISIT HI MDM: CPT | Performed by: EMERGENCY MEDICINE

## 2023-09-18 PROCEDURE — 99283 EMERGENCY DEPT VISIT LOW MDM: CPT

## 2023-09-18 PROCEDURE — 96372 THER/PROPH/DIAG INJ SC/IM: CPT

## 2023-09-18 PROCEDURE — G1004 CDSM NDSC: HCPCS

## 2023-09-18 RX ORDER — KETOROLAC TROMETHAMINE 30 MG/ML
15 INJECTION, SOLUTION INTRAMUSCULAR; INTRAVENOUS ONCE
Status: COMPLETED | OUTPATIENT
Start: 2023-09-18 | End: 2023-09-18

## 2023-09-18 RX ADMIN — KETOROLAC TROMETHAMINE 15 MG: 30 INJECTION, SOLUTION INTRAMUSCULAR; INTRAVENOUS at 14:56

## 2023-09-18 NOTE — ED PROVIDER NOTES
History  Chief Complaint   Patient presents with   • Headache     Patient reports tripped and fell Saturday night and hit the back of her head. Headaches since then. 81-year-old female presents the emergency room for headache x3 days. Patient states that 3 days ago she tried to jump up to hug her boyfriend but fell and hit the back of her head on a wooden floor. Denies any LOC. States that she has had a headache and neck pain since. She denies any blood thinners or aspirin use. Denies any numbness/tingling/weakness of her extremities, states that she had nausea but denies any vomiting. She has tried Tylenol with minimal relief. She does report that she has a history of scalp fracture a few years ago. Denies any other injury or complaints. History provided by:  Patient  Head Injury w/unknown LOC  Location:  Occipital  Mechanism of injury: fall    Fall:     Impact surface:  Hard floor    Point of impact:  Head  Pain details:     Severity:  Moderate    Timing:  Constant    Progression:  Worsening  Chronicity:  New  Relieved by:  None tried  Worsened by:  Nothing  Ineffective treatments:  None tried  Associated symptoms: headache, nausea and neck pain    Associated symptoms: no blurred vision, no double vision, no focal weakness, no loss of consciousness, no numbness and no vomiting        Prior to Admission Medications   Prescriptions Last Dose Informant Patient Reported? Taking?    Dapsone 7.5 % GEL   No No   Sig: Apply 1 Application topically in the morning   Levonorgestrel (MIRENA) 20 MCG/DAY IUD   Yes No   Si each by Intrauterine route once   albuterol (PROVENTIL HFA,VENTOLIN HFA) 90 mcg/act inhaler  Self Yes No   Sig: Inhale 1-2 puffs   dicyclomine (BENTYL) 20 mg tablet   No No   Sig: Take 1 tablet (20 mg total) by mouth every 6 (six) hours   doxycycline (PERIOSTAT) 20 MG tablet   No No   Sig: Take 1 tablet (20 mg total) by mouth 2 (two) times a day   ergocalciferol (VITAMIN D2) 50,000 units No No   Sig: Take 1 capsule (50,000 Units total) by mouth 2 (two) times a week   hydrOXYzine HCL (ATARAX) 25 mg tablet   No No   Sig: Take 1 tablet (25 mg total) by mouth daily as needed (moderate-severe anxiety)   spironolactone (ALDACTONE) 50 mg tablet   No No   Sig: Take 1 tablet (50 mg total) by mouth 2 (two) times a day   venlafaxine (EFFEXOR-XR) 75 mg 24 hr capsule   No No   Sig: Take 1 capsule (75 mg total) by mouth daily      Facility-Administered Medications: None       Past Medical History:   Diagnosis Date   • Anxiety    • Chronic vaginitis 3/15/2017   • Concussion     Last Assessed: 11/15/2017   • Depression        Past Surgical History:   Procedure Laterality Date   • APPENDECTOMY     • INSERTION OF INTRAUTERINE DEVICE (IUD) N/A        Family History   Problem Relation Age of Onset   • Restless legs syndrome Mother    • Depression Mother    • Depression Father    • Colon cancer Paternal Grandmother    • Uterine cancer Paternal Grandmother    • OCD Sister    • Anxiety disorder Brother    • Hashimoto's thyroiditis Maternal Grandmother    • Breast cancer Neg Hx    • Ovarian cancer Neg Hx    • Cervical cancer Neg Hx      I have reviewed and agree with the history as documented. E-Cigarette/Vaping   • E-Cigarette Use Former User      E-Cigarette/Vaping Substances   • Nicotine Yes    • Flavoring Yes      Social History     Tobacco Use   • Smoking status: Never   • Smokeless tobacco: Never   • Tobacco comments:     Vape - ceased use    Vaping Use   • Vaping Use: Former   • Substances: Nicotine, Flavoring   Substance Use Topics   • Alcohol use: Yes     Comment: socially   • Drug use: No       Review of Systems   Constitutional: Negative for chills and fever. HENT: Negative for congestion, ear pain and sore throat. Eyes: Negative for blurred vision, double vision, pain and visual disturbance. Respiratory: Negative for cough, shortness of breath and wheezing.     Cardiovascular: Negative for chest pain and leg swelling. Gastrointestinal: Positive for nausea. Negative for abdominal pain, diarrhea and vomiting. Genitourinary: Negative for dysuria, frequency, hematuria and urgency. Musculoskeletal: Positive for neck pain. Negative for neck stiffness. Skin: Negative for rash and wound. Neurological: Positive for headaches. Negative for focal weakness, loss of consciousness, weakness and numbness. Psychiatric/Behavioral: Negative for agitation and confusion. All other systems reviewed and are negative. Physical Exam  Physical Exam  Vitals and nursing note reviewed. Constitutional:       Appearance: She is well-developed. HENT:      Head: Normocephalic and atraumatic. Eyes:      Extraocular Movements: Extraocular movements intact. Conjunctiva/sclera: Conjunctivae normal.      Pupils: Pupils are equal, round, and reactive to light. Neck:      Comments: +midline c spine tenderenss. No bony stepoffs   Cardiovascular:      Rate and Rhythm: Normal rate and regular rhythm. Pulmonary:      Effort: Pulmonary effort is normal.      Breath sounds: Normal breath sounds. Abdominal:      General: Bowel sounds are normal.      Palpations: Abdomen is soft. Musculoskeletal:         General: Normal range of motion. Cervical back: Normal range of motion and neck supple. Skin:     General: Skin is warm and dry. Neurological:      General: No focal deficit present. Mental Status: She is alert and oriented to person, place, and time.       Comments: No focal deficits         Vital Signs  ED Triage Vitals [09/18/23 1225]   Temperature Pulse Respirations Blood Pressure SpO2   97.8 °F (36.6 °C) 74 18 119/68 98 %      Temp Source Heart Rate Source Patient Position - Orthostatic VS BP Location FiO2 (%)   Temporal Monitor Sitting Left arm --      Pain Score       3           Vitals:    09/18/23 1225   BP: 119/68   Pulse: 74   Patient Position - Orthostatic VS: Sitting         Visual Acuity      ED Medications  Medications   ketorolac (TORADOL) injection 15 mg (15 mg Intramuscular Given 9/18/23 1456)       Diagnostic Studies  Results Reviewed     None                 CT head without contrast   Final Result by Chet Hernadez MD (09/18 1342)      No acute intracranial abnormality. Workstation performed: [id number]         CT spine cervical without contrast   Final Result by Chet Hernadez MD (09/18 1342)      No cervical spine fracture or traumatic malalignment. Workstation performed: [id number]                    Procedures  Procedures         ED Course                                             Medical Decision Making  25 y/o female with headache and neck pain s/p fall- will get ct head and c-spine. Will give toradol if scan is neg. CT scans reviewed and negative for acute pathology. Will give Toradol and discharged home with outpatient follow-up. Closed head injury, initial encounter: acute illness or injury  Strain of neck muscle, initial encounter: acute illness or injury  Amount and/or Complexity of Data Reviewed  Radiology: ordered. Risk  Prescription drug management. Disposition  Final diagnoses:   Closed head injury, initial encounter   Strain of neck muscle, initial encounter     Time reflects when diagnosis was documented in both MDM as applicable and the Disposition within this note     Time User Action Codes Description Comment    9/18/2023  2:30 PM Marnee Sandifer A Add [S09.90XA] Closed head injury, initial encounter     9/18/2023  2:30 PM Marnee Sandifer A Add [S16. 1XXA] Strain of neck muscle, initial encounter       ED Disposition     ED Disposition   Discharge    Condition   Stable    Date/Time   Mon Sep 18, 2023  2:25 PM    Comment   Fatou Santos discharge to home/self care.                Follow-up Information     Follow up With Specialties Details Why Contact Info Additional Information    Gold Patel CRNP Nurse Practitioner Call in 1 day for follow up within 2-3 days [address] [facility] [place]  [facility] [id number]       [address] [place] [address] Emergency Department Emergency Medicine Go to  immediately for any new or worsening symptoms [address] [address] Yordan Montano [facility] [address] [facility] Emergency Department, [facility] [place], [facility] [address], [address]          Patient's Medications   Discharge Prescriptions    No medications on file       No discharge procedures on file.     PDMP Review     None          ED Provider  Electronically Signed by           Pilo Bundy DO  09/18/23 0871

## 2023-09-19 ENCOUNTER — VBI (OUTPATIENT)
Dept: ADMINISTRATIVE | Facility: OTHER | Age: 23
End: 2023-09-19

## 2023-09-19 NOTE — TELEPHONE ENCOUNTER
Jigar Butts    ED Visit Information     Ed visit date: 09/18/23  Diagnosis Description: headache  In Network? Yes Nathan Lobe  Discharge status: Home  Discharged with meds ? No  Number of ED visits to date: 2  ED Severity:4     Outreach Information    Outreach successful: Yes 1  Date letter mailed:no  Date Finalized:09/19/23    Care Coordination    Follow up appointment with pcp: no no appt needed  Transportation issues ?  No    [employer] type: 3 Day Outreach  Emergent necessity warranted by diagnosis: Yes  [facility] PCP: Yes  Transportation: Friend/Family Transport  Practice Contacted Patient ?: No  Pt had ED follow up with pcp/staff ?: No    Reason Patient went to ED instead of Urgent Care or PCP?: Perceived Severity of Illness  Call Complete - spoke with patient and she is feeling better

## 2023-09-27 ENCOUNTER — APPOINTMENT (OUTPATIENT)
Age: 23
End: 2023-09-27
Payer: COMMERCIAL

## 2023-09-27 DIAGNOSIS — Z13.228 SCREENING FOR ENDOCRINE, NUTRITIONAL, METABOLIC AND IMMUNITY DISORDER: ICD-10-CM

## 2023-09-27 DIAGNOSIS — Z13.29 SCREENING FOR ENDOCRINE, NUTRITIONAL, METABOLIC AND IMMUNITY DISORDER: ICD-10-CM

## 2023-09-27 DIAGNOSIS — L70.0 ACNE VULGARIS: ICD-10-CM

## 2023-09-27 DIAGNOSIS — Z13.0 SCREENING FOR ENDOCRINE, NUTRITIONAL, METABOLIC AND IMMUNITY DISORDER: ICD-10-CM

## 2023-09-27 DIAGNOSIS — Z13.21 SCREENING FOR ENDOCRINE, NUTRITIONAL, METABOLIC AND IMMUNITY DISORDER: ICD-10-CM

## 2023-09-27 LAB
ALBUMIN SERPL BCP-MCNC: 4.8 G/DL (ref 3.5–5)
ALP SERPL-CCNC: 97 U/L (ref 34–104)
ALT SERPL W P-5'-P-CCNC: 15 U/L (ref 7–52)
ANION GAP SERPL CALCULATED.3IONS-SCNC: 8 MMOL/L
AST SERPL W P-5'-P-CCNC: 19 U/L (ref 13–39)
BILIRUB DIRECT SERPL-MCNC: 0.09 MG/DL (ref 0–0.2)
BILIRUB SERPL-MCNC: 0.4 MG/DL (ref 0.2–1)
BUN SERPL-MCNC: 13 MG/DL (ref 5–25)
CALCIUM SERPL-MCNC: 9.6 MG/DL (ref 8.4–10.2)
CHLORIDE SERPL-SCNC: 100 MMOL/L (ref 96–108)
CHOLEST SERPL-MCNC: 184 MG/DL
CO2 SERPL-SCNC: 29 MMOL/L (ref 21–32)
CREAT SERPL-MCNC: 0.89 MG/DL (ref 0.6–1.3)
GFR SERPL CREATININE-BSD FRML MDRD: 92 ML/MIN/1.73SQ M
GLUCOSE P FAST SERPL-MCNC: 81 MG/DL (ref 65–99)
HDLC SERPL-MCNC: 65 MG/DL
LDLC SERPL CALC-MCNC: 107 MG/DL (ref 0–100)
NONHDLC SERPL-MCNC: 119 MG/DL
POTASSIUM SERPL-SCNC: 4.3 MMOL/L (ref 3.5–5.3)
PROT SERPL-MCNC: 7.7 G/DL (ref 6.4–8.4)
SODIUM SERPL-SCNC: 137 MMOL/L (ref 135–147)
TRIGL SERPL-MCNC: 58 MG/DL

## 2023-09-27 PROCEDURE — 80061 LIPID PANEL: CPT

## 2023-09-27 PROCEDURE — 36415 COLL VENOUS BLD VENIPUNCTURE: CPT

## 2023-09-27 PROCEDURE — 80053 COMPREHEN METABOLIC PANEL: CPT

## 2023-09-27 PROCEDURE — 82248 BILIRUBIN DIRECT: CPT

## 2023-09-29 ENCOUNTER — TELEPHONE (OUTPATIENT)
Dept: PSYCHIATRY | Facility: CLINIC | Age: 23
End: 2023-09-29

## 2023-09-29 ENCOUNTER — TELEMEDICINE (OUTPATIENT)
Dept: BEHAVIORAL/MENTAL HEALTH CLINIC | Facility: CLINIC | Age: 23
End: 2023-09-29
Payer: COMMERCIAL

## 2023-09-29 DIAGNOSIS — F41.1 GENERALIZED ANXIETY DISORDER: Primary | ICD-10-CM

## 2023-09-29 DIAGNOSIS — F32.1 CURRENT MODERATE EPISODE OF MAJOR DEPRESSIVE DISORDER WITHOUT PRIOR EPISODE (HCC): ICD-10-CM

## 2023-09-29 PROCEDURE — 90834 PSYTX W PT 45 MINUTES: CPT | Performed by: SOCIAL WORKER

## 2023-09-29 NOTE — TELEPHONE ENCOUNTER
Brenda identified herself in message. Relayed detailed information per Dr Tomasz Marroquin in regard to her lab results.

## 2023-09-29 NOTE — PSYCH
Behavioral Health Psychotherapy Progress Note    Psychotherapy Provided: Individual Psychotherapy     1. Generalized anxiety disorder        2. Current moderate episode of major depressive disorder without prior episode ([address])            Goals addressed in session: goal 1, want to balance out my time with work, school and time for myself and not feel guilty for taking time with myself. DATA:  URIAH met with Brenda  for session. She got a new kitten so now she has 2. She described the anxiety she went through with adding a new kitten to her home and the adjustment period between the two cats. She had a "mini breakdown" over it. Everything between the two cats now is "fine. She spent a lot of time researching how to add a cat into the family but. .."   She can look back and see that "over reacted."  School is going good. "She is to the point if she gets a B in a class than it's ok."  She has OCD so this is huge for her. Discussed her ER visit. During this session, this clinician used the following therapeutic modalities: Client-centered Therapy, Cognitive Behavioral Therapy, Cognitive Processing Therapy, Solution-Focused Therapy and Supportive Psychotherapy    Substance Abuse was not addressed during this session. If the client is diagnosed with a co-occurring substance use disorder, please indicate any changes in the frequency or amount of use: na. Stage of change for addressing substance use diagnoses: No substance use/Not applicable    ASSESSMENT:  Jigar Butts presents with a Euthymic/ normal mood. her affect is Normal range and intensity, which is congruent, with her mood and the content of the session. The client has made progress on their goals. Jigar Butts presents with a none risk of suicide, none risk of self-harm, and none risk of harm to others. For any risk assessment that surpasses a "low" rating, a safety plan must be developed.     A safety plan was indicated: no  If yes, describe in detail     PLAN: Between sessions, Kiki Burgos will begin addressing new tx plan goals. At the next session, the therapist will use Client-centered Therapy, Cognitive Behavioral Therapy, Cognitive Processing Therapy, Solution-Focused Therapy and Supportive Psychotherapy to address tx plan goals and new issues that arise since last session. .    Behavioral Health Treatment Plan and Discharge Planning: Kiki Burgos is aware of and agrees to continue to work on their treatment plan. They have identified and are working toward their discharge goals. yes    Visit start and stop times:    09/29/23  Start Time: 0812  Stop Time: 0900  Total Visit Time: 48 minutes    Virtual Regular Visit    Verification of patient location:    Patient is located at Home in the following state in which I hold an active license [place]      Assessment/Plan:    Problem List Items Addressed This Visit    None      Goals addressed in session: Goal 1          Reason for visit is   No chief complaint on file. Encounter provider Emmanuel Recinos    Provider located at [address] [place] [id number] [phone]      Recent Visits  No visits were found meeting these conditions. Showing recent visits within past 7 days and meeting all other requirements  Today's Visits  Date Type Provider Dept   09/29/23 [id number] Christelle Swift today's visits and meeting all other requirements  Future Appointments  No visits were found meeting these conditions. Showing future appointments within next 150 days and meeting all other requirements       The patient was identified by name and date of birth. Kiki Burgos was informed that this is a telemedicine visit and that the visit is being conducted throughthe [employer] ID platform. She agrees to proceed. .  My office door was closed.  No one else was in the room. She acknowledged consent and understanding of privacy and security of the video platform. The patient has agreed to participate and understands they can discontinue the visit at any time. Patient is aware this is a billable service. Subjective  Brenda Fernando is a 26 y.o. female  . HPI     Past Medical History:   Diagnosis Date   • Anxiety    • Chronic vaginitis 3/15/2017   • Concussion     Last Assessed: 11/15/2017   • Depression        Past Surgical History:   Procedure Laterality Date   • APPENDECTOMY     • INSERTION OF INTRAUTERINE DEVICE (IUD) N/A        Current Outpatient Medications   Medication Sig Dispense Refill   • albuterol (PROVENTIL HFA,VENTOLIN HFA) 90 mcg/act inhaler Inhale 1-2 puffs  0   • Dapsone 7.5 % GEL Apply 1 Application topically in the morning 60 g 2   • dicyclomine (BENTYL) 20 mg tablet Take 1 tablet (20 mg total) by mouth every 6 (six) hours 60 tablet 0   • doxycycline (PERIOSTAT) 20 MG tablet Take 1 tablet (20 mg total) by mouth 2 (two) times a day 180 tablet 0   • ergocalciferol (VITAMIN D2) 50,000 units Take 1 capsule (50,000 Units total) by mouth 2 (two) times a week 24 capsule 0   • hydrOXYzine HCL (ATARAX) 25 mg tablet Take 1 tablet (25 mg total) by mouth daily as needed (moderate-severe anxiety) 30 tablet 1   • Levonorgestrel (MIRENA) 20 MCG/DAY IUD 1 each by Intrauterine route once     • spironolactone (ALDACTONE) 50 mg tablet Take 1 tablet (50 mg total) by mouth 2 (two) times a day 180 tablet 2   • venlafaxine (EFFEXOR-XR) 75 mg 24 hr capsule Take 1 capsule (75 mg total) by mouth daily 90 capsule 1     No current facility-administered medications for this visit. No Known Allergies    Review of Systems    Video Exam    There were no vitals filed for this visit.     Physical Exam

## 2023-09-29 NOTE — TELEPHONE ENCOUNTER
----- Message from Nawaf Antony DO sent at 9/29/2023 10:10 AM EDT -----  Hello! I hope you all are doing well! Can you please inform Brenda that I reviewed her CMP and lipids, they look great; LDL just a touch higher than normal but nothing to worry - she should continue lifestyle modification goals regarding healthy diet and daily exercise that were discussed at previous appointments and that can be reviewed on their last 'After-Visit Summary' on [email or web address] if not given a print-out at the last visit. Thank you in advance!   Dr. David Rodriguez    ----- Message -----  From: Lab, Background User  Sent: 9/27/2023   7:36 PM EDT  To: Nawaf Antony DO

## 2023-10-13 ENCOUNTER — TELEPHONE (OUTPATIENT)
Dept: PSYCHIATRY | Facility: CLINIC | Age: 23
End: 2023-10-13

## 2023-10-13 NOTE — TELEPHONE ENCOUNTER
NO-SHOW LETTER MAILED TO [place].   ADDRESS:  [address] On [address] [place] [address]

## 2023-10-25 ENCOUNTER — TELEMEDICINE (OUTPATIENT)
Dept: BEHAVIORAL/MENTAL HEALTH CLINIC | Facility: CLINIC | Age: 23
End: 2023-10-25
Payer: COMMERCIAL

## 2023-10-25 DIAGNOSIS — F32.1 CURRENT MODERATE EPISODE OF MAJOR DEPRESSIVE DISORDER WITHOUT PRIOR EPISODE (HCC): ICD-10-CM

## 2023-10-25 DIAGNOSIS — F41.1 GENERALIZED ANXIETY DISORDER: Primary | ICD-10-CM

## 2023-10-25 PROCEDURE — 90834 PSYTX W PT 45 MINUTES: CPT | Performed by: SOCIAL WORKER

## 2023-10-25 NOTE — PSYCH
Behavioral Health Psychotherapy Progress Note    Psychotherapy Provided: Individual Psychotherapy     1. Generalized anxiety disorder        2. Current moderate episode of major depressive disorder without prior episode ([address])            Goals addressed in session: goal 1, want to balance out my time with work, school and time for myself and not feel guilty for taking time with myself. DATA:  URIAH met with Brenda  for session. She is almost done with school and graduates in Dec.  "She can't wait for school to be done."  Work is going good. "She really likes her job."  Discussed how will she do when she is "only working 40 hrs" per week. She is used to working 3 part time and going to school full time or now working full time and school full time. She has a hx of "getting depressed" when she is "not productive."   Discussed coping skills. Her boyfriend and her just celebrated their 6 year anniversary together. He is still doing good. She is going to a halloween party at a bar. This time her boyfriend is going. "She is worried about him going and getting stressed out and then her getting stressed out and. ..."  Discussed coping skills for her anxiety. During this session, this clinician used the following therapeutic modalities: Client-centered Therapy, Cognitive Behavioral Therapy, Cognitive Processing Therapy, Solution-Focused Therapy and Supportive Psychotherapy    Substance Abuse was not addressed during this session. If the client is diagnosed with a co-occurring substance use disorder, please indicate any changes in the frequency or amount of use: na. Stage of change for addressing substance use diagnoses: No substance use/Not applicable    ASSESSMENT:  Ellen Flood presents with a Euthymic/ normal mood. her affect is Normal range and intensity, which is congruent, with her mood and the content of the session. The client has made progress on their goals.      Ellen Flood presents with a none risk of suicide, none risk of self-harm, and none risk of harm to others. For any risk assessment that surpasses a "low" rating, a safety plan must be developed. A safety plan was indicated: no  If yes, describe in detail     PLAN: Between sessions, Brenda Goodman will begin addressing new tx plan goals. At the next session, the therapist will use Client-centered Therapy, Cognitive Behavioral Therapy, Cognitive Processing Therapy, Solution-Focused Therapy and Supportive Psychotherapy to address tx plan goals and new issues that arise since last session. .    Behavioral Health Treatment Plan and Discharge Planning: Timothy Saldaña is aware of and agrees to continue to work on their treatment plan. They have identified and are working toward their discharge goals. yes    Visit start and stop times:    10/25/23  Start Time: 1713  Stop Time: 1800  Total Visit Time: 47 minutes    Virtual Regular Visit    Verification of patient location:    Patient is located at Home in the following state in which I hold an active license [place]      Assessment/Plan:    Problem List Items Addressed This Visit          Other    Generalized anxiety disorder - Primary    Current moderate episode of major depressive disorder without prior episode ([address])         Goals addressed in session: Goal 1          Reason for visit is   No chief complaint on file. Encounter provider Eliane Nicholson    Provider located at [address] [place] [address] Road [address] [phone]      Recent Visits  No visits were found meeting these conditions.   Showing recent visits within past 7 days and meeting all other requirements  Today's Visits  Date Type Provider Dept   10/25/23 [id number] Christelle Swift today's visits and meeting all other requirements  Future Appointments  No visits were found meeting these conditions. Showing future appointments within next 150 days and meeting all other requirements       The patient was identified by name and date of birth. Aimee Nieves was informed that this is a telemedicine visit and that the visit is being conducted throughthe [employer] platform. She agrees to proceed. .  My office door was closed. No one else was in the room. She acknowledged consent and understanding of privacy and security of the video platform. The patient has agreed to participate and understands they can discontinue the visit at any time. Patient is aware this is a billable service. Subjective  Brenda Lebron is a 26 y.o. female  .       HPI     Past Medical History:   Diagnosis Date    Anxiety     Chronic vaginitis 3/15/2017    Concussion     Last Assessed: 11/15/2017    Depression        Past Surgical History:   Procedure Laterality Date    APPENDECTOMY      INSERTION OF INTRAUTERINE DEVICE (IUD) N/A        Current Outpatient Medications   Medication Sig Dispense Refill    albuterol (PROVENTIL HFA,VENTOLIN HFA) 90 mcg/act inhaler Inhale 1-2 puffs  0    Dapsone 7.5 % GEL Apply 1 Application topically in the morning 60 g 2    dicyclomine (BENTYL) 20 mg tablet Take 1 tablet (20 mg total) by mouth every 6 (six) hours 60 tablet 0    doxycycline (PERIOSTAT) 20 MG tablet Take 1 tablet (20 mg total) by mouth 2 (two) times a day 180 tablet 0    ergocalciferol (VITAMIN D2) 50,000 units Take 1 capsule (50,000 Units total) by mouth 2 (two) times a week 24 capsule 0    hydrOXYzine HCL (ATARAX) 25 mg tablet Take 1 tablet (25 mg total) by mouth daily as needed (moderate-severe anxiety) 30 tablet 1    Levonorgestrel (MIRENA) 20 MCG/DAY IUD 1 each by Intrauterine route once      spironolactone (ALDACTONE) 50 mg tablet Take 1 tablet (50 mg total) by mouth 2 (two) times a day 180 tablet 2    venlafaxine (EFFEXOR-XR) 75 mg 24 hr capsule Take 1 capsule (75 mg total) by mouth daily 90 capsule 1     No current facility-administered medications for this visit. No Known Allergies    Review of Systems    Video Exam    There were no vitals filed for this visit.     Physical Exam
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
## 2023-11-06 NOTE — BH TREATMENT PLAN
TREATMENT PLAN        [facility]    Name and Date of Birth:  Nasrin Acevedo 26 y.o. 2000  Date of Treatment Plan: November 14, 2023  Diagnosis/Diagnoses:    1. Generalized anxiety disorder    2. Major depressive disorder with single episode, in partial remission [facility])        Strengths/Personal Resources for Self-Care: supportive family, supportive friends, taking medications as prescribed, ability to adapt to life changes, ability to communicate well, ability to understand psychiatric illness, average or above intelligence, financial security, good physical health, good understanding of illness, independence, motivation for treatment, being resoureceful, self-reliance, sense of humor, stable employment, well educated, willingness to work on problems    Area/Areas of need: anxiety symptoms    Long Term Goal: continue improvement in acceptable anxiety level  Target Date: 6 months - May 14, 2024  Person/Persons responsible for completion of goal: Bran Lieberman, DO     Short Term Objective (s) - How will we reach this goal?:   Take medications as prescribed  Attend psychiatry appointments regularly  Follow up with medical providers  Continue psychotherapy regularly  Practice coping skills  Develop further hobbies/interests/purposeful activities  Avoid alcohol   Avoid drugs   Eat a healthy diet   Exercise DAILY   Take walks regularly  Spend more time with friends and family  Try breathing exercises  Try relaxation techniques  Target Date: 6 months - May 14, 2024  Person/Persons Responsible for Completion of Goal: Brenda     Progress Towards Goals: Continuing treatment    Treatment Modality: medication management every 3 months or sooner if needed, continue psychotherapy, and follow up with PCP  Review due 180 days from date of this plan:  May 12, 2024   Expected length of service: Ongoing treatment    My physician and I have developed this plan together, and I agree to work on the goals and objectives. I understand the treatment goals that were developed for my treatment. The treatment plan was created between Jay Ontiveros DO and Dc Torres on 11/14/23 at 09:58 AM but not signed at the time of the visit due to [address] The plan was reviewed, and verbal consent was given.

## 2023-11-08 ENCOUNTER — OFFICE VISIT (OUTPATIENT)
Age: 23
End: 2023-11-08

## 2023-11-08 VITALS — BODY MASS INDEX: 24.62 KG/M2 | TEMPERATURE: 96.7 F | WEIGHT: 139 LBS

## 2023-11-08 DIAGNOSIS — L70.0 ACNE VULGARIS: Primary | ICD-10-CM

## 2023-11-08 PROCEDURE — NC001 PR NO CHARGE: Performed by: DERMATOLOGY

## 2023-11-08 NOTE — PROGRESS NOTES
"[facility] Note     Patient Name: Brenda Darden  Encounter Date: 11/8/23     Have you been cared for by a [facility] Dermatologist in the last 3 years and, if so, which description applies to you?    Yes.  I have been here within the last 3 years, and my medical history has NOT changed since that time.  I am FEMALE/of child-bearing potential.    REVIEW OF SYSTEMS:  Have you recently had or currently have any of the following? No changes in my recent health.   PAST MEDICAL HISTORY:  Have you personally ever had or currently have any of the following?  If \"YES,\" then please provide more detail. No changes in my medical history.   HISTORY OF IMMUNOSUPPRESSION: Do you have a history of any of the following:  Systemic Immunosuppression such as Diabetes, Biologic or Immunotherapy, Chemotherapy, Organ Transplantation, Bone Marrow Transplantation?  No     Answering \"YES\" requires the addition of the dotphrase \"IMMUNOSUPPRESSED\" as the first diagnosis of the patient's visit.   FAMILY HISTORY:  Any \"first degree relatives\" (parent, brother, sister, or child) with the following?    No changes in my family's known health.   PATIENT EXPERIENCE:    Do you want the Dermatologist to perform a COMPLETE skin exam today including a clinical examination under the \"bra and underwear\" areas?  NO  If necessary, do we have your permission to call and leave a detailed message on your Preferred Phone number that includes your specific medical information?  Yes      No Known Allergies   Current Outpatient Medications:     albuterol (PROVENTIL HFA,VENTOLIN HFA) 90 mcg/act inhaler, Inhale 1-2 puffs, Disp: , Rfl: 0    dicyclomine (BENTYL) 20 mg tablet, Take 1 tablet (20 mg total) by mouth every 6 (six) hours, Disp: 60 tablet, Rfl: 0    Levonorgestrel (MIRENA) 20 MCG/DAY IUD, 1 each by Intrauterine route once, Disp: , Rfl:     Dapsone 7.5 % GEL, Apply 1 Application topically in the morning, Disp: 60 g, Rfl: 2    " "ergocalciferol (VITAMIN D2) 50,000 units, Take 1 capsule (50,000 Units total) by mouth once a week, Disp: 16 capsule, Rfl: 1    hydrOXYzine HCL (ATARAX) 25 mg tablet, Take 1 tablet (25 mg total) by mouth daily as needed (moderate-severe anxiety), Disp: 30 tablet, Rfl: 1    ondansetron (ZOFRAN-ODT) 4 mg disintegrating tablet, Take 1 tablet (4 mg total) by mouth every 6 (six) hours as needed for nausea or vomiting, Disp: 30 tablet, Rfl: 1    spironolactone (ALDACTONE) 50 mg tablet, Take 2 pills (100 mg) daily. Take with large glass of water. STOP IMMEDIATELY IF YOU BECOME UNINTENTIONALLY PREGNANT or if you decide to plan for pregnancy. Avoid large amounts of high potassium food or beverages while taking this medication., Disp: 180 tablet, Rfl: 3    venlafaxine (EFFEXOR-XR) 75 mg 24 hr capsule, Take 1 capsule (75 mg total) by mouth daily, Disp: 90 capsule, Rfl: 1          Whom besides the patient is providing clinical information about today's encounter?   NO ADDITIONAL HISTORIAN (patient alone provided history)    Physical Exam and Assessment/Plan by Diagnosis:    ACNE VULGARIS (\"COMMON ACNE\")    Physical Exam:  Psychiatric/Mood:  Anatomic Location Affected:  face  Pertinent Positives:  Pertinent Negatives:    Additional History of Present Condition:  patient currently taking spironolactone 50 bid, Doxycycline 20 mg BID and applying dapsone 7.5% cream, patient notes that never received the  Azelaic acid compounded cream withTretinoin from pharm, patient here to register for Isotretinoin    ISOTRETINOIN \"ACCUTANE\": FEMALE    Age: 23 y.o.  Weight (in KILOgrams): 63 kg    Ipledge number: patient deferred at this time    Contraception Type 1: hormonal IUD  Contraception Type 2: male latex condom  Patient reminded that this must be listed in same order on iPledge. Yes       \"Goal Dose\" in mg (125 mg x weight in kg): 0 mg ( patient deferred treatment)    Interim month  \"Daily Dose\" of Isotretinoin the patient has actually " "been taking since last visit (this is usually what was prescribed): 0 mg  \"Total # of Days\" patient took Isotretinoin since last visit (this is usually 30):  30 days  \"Total Monthly Dose\" in mg since last visit (this equals \"Daily Dose\" x \"Total # of Days\"): 0 mg    Cumulative dosage  \"Total cumulative Dose\" in mg (add the above \"Total Monthly Dose\" with \"Total Cumulative Dose\" from last visit: 0 mg    Labs  Date of last labs: 9/27/23  Completed: yes  Labs reviewed: within normal limits  Pregnancy test:   Result: negative  Interpretation- pregnant: YES    DIAGNOSES:    Acne Vulgaris  High Risk Medication  Medication Monitoring  Xerosis (see below for patient education)    Assessment and Plan:  Target Total Dose per KILOgram:  125 mg/KILOgram (this may change this on a patient-by-patient basis)  Planned daily dose for next 30 days: 30  (please remember to add patient's \"Ipledge number\" to actual prescription):    Return to clinic in about 1 month, please review ipledge guidelines in terms of timing (see below for patient education)  Get labs 1-2 days before next appointment: YES  Patient confirmed in iPledge: YES  Patients counseled:  Cannot donate blood while taking isotretinoin and for 1 month after completing therapy. YES  Do not share medication with anyone. YES  Possible side effects discussed, including sun sensitivity, dry lips/eyes/skin, headaches, blurry vision (pseudotumor cerebri), muscle/joint aches, GI upset, bloody stools (“IBD” including Crohn’s/Ulcerative Colitis), jaundice, liver dysfunction, lipid abnormalities, bone marrow dysfunction, mood effects, thoughts of hurting oneself or others, and flaring of acne (acne fulminans/SAPPHO). YES  Females cannot get pregnant and must be on two forms of birth control while on therapy and at least one month after. YES  Report any side effects of mood swings or depression and stop medication upon occurrence. YES  Patient needs to confirm counseling in iPledge " prior to picking up prescription. YES      Isotretinoin for Acne   Isotretinoin, a derivative of vitamin A, is a retinoid medication that is taken by mouth to treat severe nodular acne. Typically, it is used once other acne treatments have not worked, such as oral antibiotics. Isotretinoin is powerful drug with several significant potential side effects. It used to be sold under the brand name “Accutane” but now several versions exist. Usually isotretinoin is taken for 4 to 6 months, although the length of treatment can vary from person to person.  While most patient’s acne improves and may even clear with this medication, in 20% of patients acne can come back. This requires additional acne treatment or even a second cycle of isotretinoin.     How should I take isotretinoin?   Isotretinoin dosing is weight-based and should be taken exactly as prescribed.    If you miss a dose, skip that dose. Do not take 2 doses at the same time.    Take with food to help with absorption.  All instructions in the iPLEDGE program packet (www.[employer]) that was provided must be followed (see below for highlights).   You will get a 30 day supply of isotretinoin at a time. It cannot be automatically refilled.  Make certain that you have been given enough medication to last 30 days as pharmacists are unable to refill or make changes within a month.  You must return to your dermatologist every month to make sure you are not having any serious side effects from isotretinoin. For female patients, this visit will always include a monthly pregnancy test. Other laboratory studies, including liver function tests, cholesterol and triglycerides, must also be conducted before and during treatment.     What should I avoid while taking isotretinoin?   Do not get pregnant from one month prior or 1 month following taking any isotretinoin.   Do not donate blood while take isotretinoin or until 1 months after coming off the medication.   Do  not have cosmetic procedures to smooth your skin, including waxing, dermabrasion, or laser procedures, while taking this medication and for at least 6 months after you stop.    Do not share isotretinoin with any other people. It can cause birth defects and other serious health problems.   Do not use any other acne medications, including medicated washes, cleansers, creams or antibiotic pills during your treatment with isotretinoin unless expressly directed to by your dermatologist.     Initiating isotretinoin & the iPLEDGE Program   The iPLEDGE Program is a strict, government-required program to prevent females from becoming pregnant while on isotretinoin. All females and males must participate. Note: Your provider must follow this program and cannot change any of the requirements.   Before starting isotretinoin, your provider will talk to you about the safe use of this medication and you will need to sign consent forms in order to receive treatment.    If you fail to keep appointments, you will be unable to get your prescription filled.     For females of childbearing age:      You must be on two specific forms of birth control before starting isotretinoin. Your provider must get 2 negative pregnancy tests, at least 30 days apart, before you can proceed with the medication. The second pregnancy test must be obtained within 5 days of the menstrual cycle. If you choose not to be sexually active during treatment, you still must have the 2 negative pregnancy tests.   You must answer a series of questions either online or by phone every month prior to getting the prescription.  Monthly prescriptions must be filled within 7 days of that month's pregnancy test.  It is important that you notify your physician well before the 7th day if there are any unforeseen delays, such as prior authorizations.  For more information, visit: [email or web address]    What are the possible side effects of  isotretinoin? What should I do?   Most side effects from isotretinoin are mild and can be easily improved with simple remedies.  Others are more concerning.   Dry skin and eyes, chapped lips and dry nose that may lead to nosebleeds.    Dry Skin: Apply sensitive skin moisturizers to dry skin at least two times a day. You may need sunscreen (SPF 30) in the morning and to reapply when outside.    Dry Eyes: Use saline eye drops or artificial tears.    Dry Nose/Nosebleeds: Use saline nasal spray (ex. Ayr) during the day and at bedtime. To stop nosebleeds, hold pressure.  If this does not work, call your dermatologist.    Chapped lips: apply petrolatum-based lip balms routinely. Avoid anything “medicated.” Contact your dermatologist for excessive dryness, cracks, tenderness or pain.   Increased blood fats and cholesterol (usually in patients with a personal or family history of cholesterol or triglyceride problems).    Vision problems affecting your ability to see in the dark and drive at night.   Bone, muscle and tendon aches can occur. Additional stretching before and after activities may help relieve aches.  If you are otherwise healthy, consider the use of ibuprofen or naproxen.  If you are unsure if you can use these pain medications, ask your doctor first. Also, call your doctor if you experience severe back pain, joint pain, or a broken bone   Changes in mood, including anxiety, depressive symptoms or suicidal thoughts which may or may not be temporary.  Notify your doctor if your or a family member have suffered from these conditions or if you have any concerns during treatment.   Serious birth defects or miscarriage can occur while taking this medication and for one month after taking your last dose of isotretinoin. You must not get pregnant while taking isotretinoin. Once the medication is out of your system, 30 days, there is no effect on the baby.   Increased pressure in the brain. Call your doctor right away  "if you experience bad headache, blurred vision, dizziness, nausea or vomiting, or seizures.   Skin rash - call your doctor right away if you develop any rashes or blisters on the skin.   Liver damage - call your doctor right away if you experience severe stomach, chest or bowel pain, painful swallowing, diarrhea, blood in your stool, yellowing of your skin or eyes, or dark urine.   Gastrointestinal bleeding. If you experience unusual abdominal pain or red or black/tarry stools, call your doctor immediately. You should also notify your doctor if you or a family member has a history of ulcerative colitis or Crohns disease.   Worsening acne. Mild worsening of acne can occur in the first few weeks of using isotreinoin. If your acne is getting significantly worse, call your doctor. This may require temporarily stopping the isotretinoin and possibly adding other medications.           XEROSIS (\"DRY SKIN\")    Dry skin refers to skin that feels dry to touch. Dry skin has a dull surface with a rough, scaly quality. The skin is less pliable and cracked. When dryness is severe, the skin may become inflamed and fissured.  Although any body site can be dry, dry skin tends to affect the shins more than any other site.    Dry skin is lacking moisture in the outer horny cell layer (stratum corneum) and this results in cracks in the skin surface.  Dry skin is also called xerosis, xeroderma or asteatosis (lack of fat).  It can affect males and females of all ages. There is some racial variability in water and lipid content of the skin.  Dry skin that starts in early childhood may be one of about 20 types of ichthyosis (fish-scale skin). There is often a family history of dry skin.   Dry skin is commonly seen in people with atopic dermatitis.  Nearly everyone > 60 years has dry skin.    Dry skin that begins later may be seen in people with certain diseases and conditions.  Postmenopausal women  Hypothyroidism  Chronic renal disease "   Malnutrition and weight loss   Subclinical dermatitis   Treatment with certain drugs such as oral retinoids, diuretics and epidermal growth factor receptor inhibitors    People exposed to a dry environment may experience dry skin.  Low humidity: in desert climates or cool, windy conditions   Excessive air conditioning   Direct heat from a fire or fan heater   Excessive bathing   Contact with soap, detergents and solvents   Inappropriate topical agents such as alcohol   Frictional irritation from rough clothing or abrasives    Dry skin is due to abnormalities in the integrity of the barrier function of the stratum corneum, which is made up of corneocytes.  There is an overall reduction in the lipids in the stratum corneum.   Ratio of ceramides, cholesterol and free fatty acids may be normal or altered.   There may be a reduction in the proliferation of keratinocytes.   Keratinocyte subtypes change in dry skin with a decrease in keratins K1, K10 and increase in K5, K14.   Involucrin (a protein) may be expressed early, increasing cell stiffness.   The result is retention of corneocytes and reduced water-holding capacity.    What is the treatment for dry skin?  The mainstay of treatment of dry skin and ichthyosis is moisturisers/emollients. They should be applied liberally and often enough to:  Reduce itch   Improve the barrier function   Prevent entry of irritants, bacteria   Reduce transepidermal water loss.    When considering which emollient is most suitable, consider:  Severity of the dryness   Tolerance   Personal preference   Cost and availability.    Emollients generally work best if applied to damp skin, if pH is below 7 (acidic), and if containing humectants such as urea or propylene glycol.  Additional treatments include:  Topical steroid if itchy or there is dermatitis -- choose an emollient base   Topical calcineurin inhibitors if topical steroids are unsuitable.    How can dry skin be  prevented?  Eliminate aggravating factors:  Reduce the frequency of bathing.   A humidifier in winter and air conditioner in summer   Compare having a short shower with a prolonged soak in a bath.   Use lukewarm, not hot, water.   Replace standard soap with a substitute such as a synthetic detergent cleanser, water-miscible emollient, bath oil, anti-pruritic tar oil, colloidal oatmeal etc.   Apply an emollient liberally and often, particularly shortly after bathing, and when itchy. The drier the skin, the thicker this should be, especially on the hands.    What is the outlook for dry skin?  A tendency to dry skin may persist life-long, or it may improve once contributing factors are controlled.         Scribe Attestation      I,:  Kelli Broussard am acting as a scribe while in the presence of the attending physician.:       I,:  Amber Becerra MD personally performed the services described in this documentation    as scribed in my presence.:

## 2023-11-10 ENCOUNTER — TELEMEDICINE (OUTPATIENT)
Dept: BEHAVIORAL/MENTAL HEALTH CLINIC | Facility: CLINIC | Age: 23
End: 2023-11-10
Payer: COMMERCIAL

## 2023-11-10 DIAGNOSIS — F32.1 CURRENT MODERATE EPISODE OF MAJOR DEPRESSIVE DISORDER WITHOUT PRIOR EPISODE (HCC): ICD-10-CM

## 2023-11-10 DIAGNOSIS — F41.1 GENERALIZED ANXIETY DISORDER: Primary | ICD-10-CM

## 2023-11-10 PROCEDURE — 90834 PSYTX W PT 45 MINUTES: CPT | Performed by: SOCIAL WORKER

## 2023-11-10 NOTE — PSYCH
Behavioral Health Psychotherapy Progress Note    Psychotherapy Provided: Individual Psychotherapy     1. Generalized anxiety disorder        2. Current moderate episode of major depressive disorder without prior episode ([address])            Goals addressed in session: goal 1, want to balance out my time with work, school and time for myself and not feel guilty for taking time with myself. DATA:  MSW met with Brenda  for session. Her boyfriend and her went out to a party for the holiday. She was worried how he would handle being in a large, loud crowd environment but, "he did fine. They had a good time."   Discussed her mother. Her mother is "crazy."  The way she described her mother it appears her mother is histrionic. She gave examples of how her mother is. Her mother called her brother and it turned out to be another example of  her mother's histrionic personality but at the time Camelia Bhatia and her brother were unsure of that so they responded as if it were an emergency. Discussed Brenda's anxiety and the pressure she put on herself when in school. She realizes how much undue pressure she had put on herself regarding her grades. She graduates college in less than a month. During this session, this clinician used the following therapeutic modalities: Client-centered Therapy, Cognitive Behavioral Therapy, Cognitive Processing Therapy, Solution-Focused Therapy and Supportive Psychotherapy    Substance Abuse was not addressed during this session. If the client is diagnosed with a co-occurring substance use disorder, please indicate any changes in the frequency or amount of use: na. Stage of change for addressing substance use diagnoses: No substance use/Not applicable    ASSESSMENT:  Karissa Luevano presents with a Euthymic/ normal mood. her affect is Normal range and intensity, which is congruent, with her mood and the content of the session. The client has made progress on their goals.      Brenda MADDEN Nazario Salguero presents with a none risk of suicide, none risk of self-harm, and none risk of harm to others. For any risk assessment that surpasses a "low" rating, a safety plan must be developed. A safety plan was indicated: no  If yes, describe in detail     PLAN: Between sessions, Brenda Jenkins will begin addressing new tx plan goals. At the next session, the therapist will use Client-centered Therapy, Cognitive Behavioral Therapy, Cognitive Processing Therapy, Solution-Focused Therapy and Supportive Psychotherapy to address tx plan goals and new issues that arise since last session. .    Behavioral Health Treatment Plan and Discharge Planning: Toro Chino is aware of and agrees to continue to work on their treatment plan. They have identified and are working toward their discharge goals. yes    Visit start and stop times:    11/10/23  Start Time: 0912  Stop Time: 1000  Total Visit Time: 48 minutes    Virtual Regular Visit    Verification of patient location:    Patient is located at Home in the following state in which I hold an active license [place]      Assessment/Plan:    Problem List Items Addressed This Visit       Generalized anxiety disorder - Primary    Current moderate episode of major depressive disorder without prior episode ([address])       Goals addressed in session: Goal 1          Reason for visit is   No chief complaint on file. Encounter provider Dariel Peña    Provider located at [address] [place] [id number] [phone]      Recent Visits  No visits were found meeting these conditions.   Showing recent visits within past 7 days and meeting all other requirements  Today's Visits  Date Type Provider Dept   11/10/23 [id number] [place] today's visits and meeting all other requirements  Future Appointments  No visits were found meeting these conditions. Showing future appointments within next 150 days and meeting all other requirements       The patient was identified by name and date of birth. Wilmar Calix was informed that this is a telemedicine visit and that the visit is being conducted throughthe [employer] platform. She agrees to proceed. .  My office door was closed. No one else was in the room. She acknowledged consent and understanding of privacy and security of the video platform. The patient has agreed to participate and understands they can discontinue the visit at any time. Patient is aware this is a billable service. Subjective  Brenda Mendoza is a 26 y.o. female  .       HPI     Past Medical History:   Diagnosis Date    Acne     Anxiety     Chronic vaginitis 03/15/2017    Concussion     Last Assessed: 11/15/2017    Depression        Past Surgical History:   Procedure Laterality Date    APPENDECTOMY      INSERTION OF INTRAUTERINE DEVICE (IUD) N/A        Current Outpatient Medications   Medication Sig Dispense Refill    albuterol (PROVENTIL HFA,VENTOLIN HFA) 90 mcg/act inhaler Inhale 1-2 puffs  0    Dapsone 7.5 % GEL Apply 1 Application topically in the morning 60 g 2    dicyclomine (BENTYL) 20 mg tablet Take 1 tablet (20 mg total) by mouth every 6 (six) hours 60 tablet 0    doxycycline (PERIOSTAT) 20 MG tablet Take 1 tablet (20 mg total) by mouth 2 (two) times a day 180 tablet 0    ergocalciferol (VITAMIN D2) 50,000 units Take 1 capsule (50,000 Units total) by mouth 2 (two) times a week 24 capsule 0    hydrOXYzine HCL (ATARAX) 25 mg tablet Take 1 tablet (25 mg total) by mouth daily as needed (moderate-severe anxiety) 30 tablet 1    Levonorgestrel (MIRENA) 20 MCG/DAY IUD 1 each by Intrauterine route once      spironolactone (ALDACTONE) 50 mg tablet Take 1 tablet (50 mg total) by mouth 2 (two) times a day 180 tablet 2    venlafaxine (EFFEXOR-XR) 75 mg 24 hr capsule Take 1 capsule (75 mg total) by mouth daily 90 capsule 1 No current facility-administered medications for this visit. No Known Allergies    Review of Systems    Video Exam    There were no vitals filed for this visit.     Physical Exam

## 2023-11-14 ENCOUNTER — TELEMEDICINE (OUTPATIENT)
Dept: PSYCHIATRY | Facility: CLINIC | Age: 23
End: 2023-11-14

## 2023-11-14 DIAGNOSIS — F32.4 MAJOR DEPRESSIVE DISORDER WITH SINGLE EPISODE, IN PARTIAL REMISSION (HCC): ICD-10-CM

## 2023-11-14 DIAGNOSIS — F41.1 GENERALIZED ANXIETY DISORDER: Primary | ICD-10-CM

## 2023-11-14 RX ORDER — VENLAFAXINE HYDROCHLORIDE 75 MG/1
75 CAPSULE, EXTENDED RELEASE ORAL DAILY
Qty: 90 CAPSULE | Refills: 1 | Status: SHIPPED | OUTPATIENT
Start: 2023-11-14 | End: 2024-05-12

## 2023-11-14 NOTE — PATIENT INSTRUCTIONS
Please take the following medications: Call if you are having any side effects or would like to make any changes to dose or frequency. Continue Effexor XR 75 mg daily. Continue Atarax 25 mg up to once daily PRN ONLY AS NEEDED for severe anxiety, last use in April 2023. Continue practicing coping strategies, positive affirmations, reframing thoughts, prioritizing sleep hygiene, lifestyle modifications including increasing DAILY exercise, daily morning sunshine, focusing on healthier whole food options and limiting or eliminating packaged items/snacks with added sugars and processed oils, and to maintain adequate hydration, increase mindfulness activities such as journaling, and incorporate more interests and hobbies, walks in nature, breathing techniques and guided meditation, and socialization with friends and family. Continue scheduled psychotherapy with Maco Done every 2 weeks. Patient informed to continue annual follow-ups with PCP and obtain annual fasting labs ordered by PCP. Follow-up in 4 months. You are encouraged to increase mind-stimulating activities such as reading, crosswords, word searches, puzzles, Soduku, solitaire, coloring, board games involving other, and other brain games. We also discussed the importance of staying physically active and eating a health diet such as the Mediterranean or MIND diet. Things that we know are helpful for thinking and memory   Exercise program: gradually increase your physical activity over time. Start small and be patient. Aerobic (cardio) activity is best but incorporate balance, strength and flexibility training as well. Try to get at least 30min 3 times per week and work up to daily activity (even on rest days with walking, stretching, or yoga). Diet: Mediterranean diet (colorful fruits and vegetables, olive oil, fish, whole grains, very little red meet is any), MIND diet, anti-inflammatory diet.    Stay well hydrated: drink 8 glasses of water per day. What's good for your heart is good for your brain. Avoid high-salt foods. Sleep: aim for at least 7-8 hours per night. Avoid alcohol and medications like Benadryl (Tylenol PM) or other sedating drugs and rather work on sleep hygiene. Stress management/mindfulness practice:   Talk with our social workers about finding a cognitive behavioral therapists. Try a smart phone leda like “[employer]” or “mindfulness for beginners.”  Try “curable” for pain. Take a course in Mindfulness Based Stress Reduction (MBSR). Bakari  Read or listen to an audiobook about it:  Mindfulness for beginners. 10% happier. The happiness advantage. Social engagement:   Stay in touch with family and friends. Plan a few specific activities for your social health every week. Join a local support group. Volunteer! [email or web address] or call 248-380-2027. MIND diet score:  1 point for each component. Green leafy vegetables: at least 6 per week  Other vegetable: at least 1 per day  Berries: at least 2 per week  Red meat: fewer than 4 per week  Fish: at least 1 per week  Poultry: at least 2 per week  Beans: at least 3 per week  Nuts: at least 5 per week  Fast or fried food: less than 1 per week  Olive oil for cooking, avoid processed vegetable oils  Butter less: less than 1 table-spoon per day  Cheese: less than 1 serving per week  Pastries/sweets: less than 5 servings per week  Alcohol: no more than 1 serving/ day     Please call the office nursing staff for medication issues including refills, problems getting medications, bothersome side effects, etc., at [phone]. Please return for a follow up appointment as discussed and arrive approximately 15 minutes prior to your appointment time. If you are running late or are unable to attend your appointment, please call our [place] office at [phone] (fax: [phone]).     Look up "grounding techniques" and/or "anchoring demonstration" online and try a few to see what may work for you. Practice these skills before you need them, when you are not feeling too anxious or triggered. You can also search for free guided meditation videos online to help improve your head space when you are feeling very anxious or triggered. Recommendations regarding insomnia:  Wake-up at the same time every day  Refrain from "napping". Refrain from going to bed unless you're tired  Utilize your bedroom for sleep only. Avoid use of electronics including television and/or cellphone/computers. Refrain from use of electronics including television and/or cellphones/computers prior to bed  Turn your alarm clock away so the light is not visible. Attempt relaxation using various means like reading if you're restless in bed for approximately 15-20 minutes. Participate in regular physical activities like exercise, although avoid approximately 3-4 hours prior to bed. Morning exercise is ideal.  Avoid caffeine use prior to bedtime. Consider tapering down excessive use of caffeine. Avoid tobacco use prior to bedtime. Avoid alcohol use prior to bedtime. Consider reading "No More Sleepless nights" by Ben Watkins, Ph.D.  Consider use of online resources including:  http://ZoomTilt/cbt-online-insomnia-treatment.html  [employer]. com  CBT-I  Linda on your Smart Phone. Go! To Sleep by the [facility]. Healthy Diet   The American Heart Association and the [employer] of Cardiology have long recommended a healthy diet for not only patients who are at risk for atherosclerotic cardiovascular disease (ASCVD) but also the general public.  In keeping with this evidence-based recommendation, the "2018 Guideline on the Management of Blood Cholesterol" stresses that a healthy diet should include adequate intake of these essentials:   Vegetables, fruits, and whole grains   Legumes and nuts   Low-fat dairy products   Low-fat poultry (without the skin)   Fish and seafood   Nontropical vegetable oils     The recent guidelines do provide room for cultural food preferences in a healthy diet, but in general, all patients should limit their intake of saturated and avoid all trans fats, sweets, sugar-sweetened beverages, and red meats. Please maintain adequate hydration of at least 2 Liters of water per day, and improve nutrition by decreasing portion sizes, avoiding fried foods, fast foods, inappropriate snacking and overly processed and packaged items with 'added sugars' (whether in drinks or foods), and observing nutritional facts information on any items that are packaged to reduce intake of saturated fats and AVOID trans fats as mentioned above. Again, consume whole foods such as vegetables, higher lean protein intake, and using healthier lifestyle plans such as the Mediterranean diet with healthier fats such as those from seeds, nuts, and using organic extra virgin olive oil or avocado oil in lieu of processed vegetable oils or margarine. Physical Activity   Recommended DAILY exercise for at least 150 minutes of moderate exercise weekly! In addition to a healthy diet, all patients should include regular physical activity in their weekly routines, at moderate to vigorous intensity. Any activity is better than nothing, so if your patients can't meet the recommendation of vigorous activity, moderate-intensity activity can still help them reduce their risk of ASCVD. Below are the American Heart Association's recommendations for physical activity per week (preferably spread throughout the week):      For Overall Cardiovascular Health and Lowering Cholesterol   At least 150 minutes of moderate-intensity physical activity (for example, 30 minutes, 5 days a week), or   At least 75 minutes of vigorous-intensity physical activity (for example, 25 minutes, 3 days a week); or   A combination of moderate- and vigorous-intensity aerobic activity, and   At least 2 days of moderate- to high-intensity muscle-strengthening activities (such as resistance weight training) for additional health benefits    Weight Control   It's important to work with patients to help them reach and maintain a healthy weight (Table 3). You may need to suggest that they adjust their caloric intake to avoid weight gain or, in overweight and obese patients, to promote weight loss. Table 3. Body Mass Index           If you have thoughts of harming yourself or are otherwise in psychological crisis, do not hesitate to contact your [facility] hotline, or 911 or go to the nearest emergency room. Adult Crisis Numbers  Suicide Prevention Hotline - Dial   [facility]: [phone] or [address]: [address] [facility] [address] [address] [facility] [address]: [address]: [phone]  [facility]: [phone] or [phone]  [facility]: [address] [place] [address] [place]: [phone] or [facility] Crisis: [address]: [phone] (daytime). [phone] (after hours, weekends, holidays)     Child/Adolescent Crisis Numbers   [facility]: [address]: [address]: [phone]   [facility]: [phone]  Please note: Some [facility] do not have a separate number for Child/Adolescent specific crisis. If your county is not listed under Child/Adolescent, please call the adult number for your county     National Talk to Text Line   All Ages - [address]: [phone] or call 354.

## 2023-11-20 ENCOUNTER — PATIENT MESSAGE (OUTPATIENT)
Dept: OBGYN CLINIC | Facility: CLINIC | Age: 23
End: 2023-11-20

## 2023-11-20 DIAGNOSIS — N76.1 CHRONIC VAGINITIS: Primary | ICD-10-CM

## 2023-11-21 DIAGNOSIS — N76.1 CHRONIC VAGINITIS: ICD-10-CM

## 2023-11-21 RX ORDER — FLUCONAZOLE 150 MG/1
150 TABLET ORAL ONCE
Qty: 2 TABLET | Refills: 0 | Status: SHIPPED | OUTPATIENT
Start: 2023-11-21 | End: 2023-11-21

## 2023-11-28 ENCOUNTER — TELEMEDICINE (OUTPATIENT)
Dept: BEHAVIORAL/MENTAL HEALTH CLINIC | Facility: CLINIC | Age: 23
End: 2023-11-28
Payer: COMMERCIAL

## 2023-11-28 DIAGNOSIS — F41.1 GENERALIZED ANXIETY DISORDER: ICD-10-CM

## 2023-11-28 DIAGNOSIS — F32.1 CURRENT MODERATE EPISODE OF MAJOR DEPRESSIVE DISORDER WITHOUT PRIOR EPISODE (HCC): Primary | ICD-10-CM

## 2023-11-28 PROCEDURE — 90834 PSYTX W PT 45 MINUTES: CPT | Performed by: SOCIAL WORKER
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
## 2023-12-05 ENCOUNTER — TELEMEDICINE (OUTPATIENT)
Dept: DERMATOLOGY | Facility: CLINIC | Age: 23
End: 2023-12-05
Payer: COMMERCIAL

## 2023-12-05 DIAGNOSIS — L70.0 ACNE VULGARIS: Primary | ICD-10-CM

## 2023-12-05 PROCEDURE — 99214 OFFICE O/P EST MOD 30 MIN: CPT | Performed by: STUDENT IN AN ORGANIZED HEALTH CARE EDUCATION/TRAINING PROGRAM

## 2023-12-05 RX ORDER — DAPSONE 75 MG/G
1 GEL TOPICAL DAILY
Qty: 60 G | Refills: 2 | Status: CANCELLED | OUTPATIENT
Start: 2023-12-05 | End: 2024-01-04

## 2023-12-05 NOTE — PROGRESS NOTES
[facility] Note     Patient Name: Petros Grayson  Encounter Date: 12/5/2023     Have you been cared for by a [facility] Dermatologist in the last 3 years and, if so, which description applies to you? Yes. I have been here within the last 3 years, and my medical history has NOT changed since that time. I am FEMALE/of child-bearing potential.    REVIEW OF SYSTEMS:  Have you recently had or currently have any of the following? No changes in my recent health. PAST MEDICAL HISTORY:  Have you personally ever had or currently have any of the following? If "YES," then please provide more detail. No changes in my medical history. HISTORY OF IMMUNOSUPPRESSION: Do you have a history of any of the following:  Systemic Immunosuppression such as Diabetes, Biologic or Immunotherapy, Chemotherapy, Organ Transplantation, Bone Marrow Transplantation? No     Answering "YES" requires the addition of the dotphrase "IMMUNOSUPPRESSED" as the first diagnosis of the patient's visit. FAMILY HISTORY:  Any "first degree relatives" (parent, brother, sister, or child) with the following? No changes in my family's known health. PATIENT EXPERIENCE:    Do you want the Dermatologist to perform a COMPLETE skin exam today including a clinical examination under the "bra and underwear" areas? NO  If necessary, do we have your permission to call and leave a detailed message on your Preferred Phone number that includes your specific medical information?   Yes      No Known Allergies   Current Outpatient Medications:     albuterol (PROVENTIL HFA,VENTOLIN HFA) 90 mcg/act inhaler, Inhale 1-2 puffs, Disp: , Rfl: 0    dicyclomine (BENTYL) 20 mg tablet, Take 1 tablet (20 mg total) by mouth every 6 (six) hours, Disp: 60 tablet, Rfl: 0    ergocalciferol (VITAMIN D2) 50,000 units, Take 1 capsule (50,000 Units total) by mouth 2 (two) times a week, Disp: 24 capsule, Rfl: 0    Levonorgestrel (MIRENA) 20 MCG/DAY IUD, 1 each by Intrauterine route once, Disp: , Rfl:     venlafaxine (EFFEXOR-XR) 75 mg 24 hr capsule, Take 1 capsule (75 mg total) by mouth daily, Disp: 90 capsule, Rfl: 1    Dapsone 7.5 % GEL, Apply 1 Application topically in the morning, Disp: 60 g, Rfl: 2    hydrOXYzine HCL (ATARAX) 25 mg tablet, Take 1 tablet (25 mg total) by mouth daily as needed (moderate-severe anxiety), Disp: 30 tablet, Rfl: 1    spironolactone (ALDACTONE) 50 mg tablet, Take 1 tablet (50 mg total) by mouth 2 (two) times a day, Disp: 180 tablet, Rfl: 2          Whom besides the patient is providing clinical information about today's encounter? NO ADDITIONAL HISTORIAN (patient alone provided history)        ACNE VULGARIS ("COMMON ACNE")    Physical Exam:  Anatomic Location Affected: Face in past  Morphological Description: Clear today  Pertinent Positives:  Pertinent Negatives: Additional History of Present Condition:  Present for follow up. Was previously planned for accutane but patient notes that she is responding well to the spironolactone and topical dapsone so she would like to hold on this at this time. She denies any side effects with the spironolactone. Discussed that treatment is directed at improving skin appearance and reducing the likelihood of scarring. Discussed theraputic ladder including topical OTC treatments, topical prescriptions, and oral medications. Discussed side effects as noted below. AM:  - Continue Dapsone 7.5% cream topically once a day to face until told otherwise. Ordered with 4 refills from [facility] (ID [id number])  - SPF 30 or greater (can be a lotion with SPF like CeraVe AM)  - Start non-comedogenic moisturizer such as CeraVe, Cetaphil or Vanicream.      PM:  - Gentle cleanser, such as CeraVe, Cetaphil or La Roche Posay  - Start non-comedogenic moisturizer such as CeraVe, Cetaphil or Vanicream. May use on top of retinoid.  If retinoid is too drying, may employ the "sandwich method." To do this, apply layer of non-comedogenic moisturizer, followed by layer of retinoid, followed by another layer of non-comedogenic moisturizer. - Continue spironolactone 50 mg BID. Patient is tolerating well without side effects. No plans for pregnancy. Understands this medication would need to be stopped prior to planning for pregnancy. No personal or family history of breast cancer. Call with any questions or concerns before next follow-up visit; do not stop medications abruptly without consulting provider. WHEN AND WHERE TO CALL WITH CONCERNS  We are here to help! If you experience any unusual symptoms, then stop taking or using the medication and call our office at [phone] (SKIN). It is better to be safe than to be sorry! Scribe Attestation      I,:  Wendy Rodriguez MA am acting as a scribe while in the presence of the attending physician.:       I,:  Edwina Vergara MD personally performed the services described in this documentation    as scribed in my presence.:          Virtual Regular Visit    Verification of patient location:    Patient is located at Home in the following state in which I hold an active license [place]      Assessment/Plan:    Problem List Items Addressed This Visit    None           Reason for visit is   Chief Complaint   Patient presents with    Virtual Regular Visit          Encounter provider Edwina Vergara MD    Provider located at [address]  [phone]      Recent Visits  No visits were found meeting these conditions. Showing recent visits within past 7 days and meeting all other requirements  Today's Visits  Date Type Provider Dept   12/05/23 Telemedicine Edwina Vergara MD  Dermatology [address] [place] [address]   Showing today's visits and meeting all other requirements  Future Appointments  No visits were found meeting these conditions.   Showing future appointments within next 150 days and meeting all other requirements       The patient was identified by name and date of birth. Shaila Rueda was informed that this is a telemedicine visit and that the visit is being conducted through the [facility] [employer] platform. She agrees to proceed. .  My office door was closed. The patient was notified the following individuals were present in the room [employer]. She acknowledged consent and understanding of privacy and security of the video platform. The patient has agreed to participate and understands they can discontinue the visit at any time. Patient is aware this is a billable service. Subjective  Brenda Holley is a 26 y.o. female presenting for acne f/u . HPI     Past Medical History:   Diagnosis Date    Acne     Anxiety     Chronic vaginitis 03/15/2017    Concussion     Last Assessed: 11/15/2017    Depression        Past Surgical History:   Procedure Laterality Date    APPENDECTOMY      INSERTION OF INTRAUTERINE DEVICE (IUD) N/A        Current Outpatient Medications   Medication Sig Dispense Refill    albuterol (PROVENTIL HFA,VENTOLIN HFA) 90 mcg/act inhaler Inhale 1-2 puffs  0    dicyclomine (BENTYL) 20 mg tablet Take 1 tablet (20 mg total) by mouth every 6 (six) hours 60 tablet 0    ergocalciferol (VITAMIN D2) 50,000 units Take 1 capsule (50,000 Units total) by mouth 2 (two) times a week 24 capsule 0    Levonorgestrel (MIRENA) 20 MCG/DAY IUD 1 each by Intrauterine route once      venlafaxine (EFFEXOR-XR) 75 mg 24 hr capsule Take 1 capsule (75 mg total) by mouth daily 90 capsule 1    Dapsone 7.5 % GEL Apply 1 Application topically in the morning 60 g 2    hydrOXYzine HCL (ATARAX) 25 mg tablet Take 1 tablet (25 mg total) by mouth daily as needed (moderate-severe anxiety) 30 tablet 1    spironolactone (ALDACTONE) 50 mg tablet Take 1 tablet (50 mg total) by mouth 2 (two) times a day 180 tablet 2     No current facility-administered medications for this visit.         No Known Allergies    Review of Systems    Video Exam    There were no vitals filed for this visit.     Physical Exam     Visit Time  Total Visit Duration: 15 minutes    Scribe Attestation      I,:  Racheal Tariq MA am acting as a scribe while in the presence of the attending physician.:       I,:  Jason Pastrana MD personally performed the services described in this documentation    as scribed in my presence.:

## 2023-12-06 RX ORDER — SPIRONOLACTONE 50 MG/1
TABLET, FILM COATED ORAL
Qty: 180 TABLET | Refills: 3 | Status: SHIPPED | OUTPATIENT
Start: 2023-12-06

## 2023-12-14 ENCOUNTER — TELEMEDICINE (OUTPATIENT)
Dept: BEHAVIORAL/MENTAL HEALTH CLINIC | Facility: CLINIC | Age: 23
End: 2023-12-14
Payer: COMMERCIAL

## 2023-12-14 DIAGNOSIS — F32.1 CURRENT MODERATE EPISODE OF MAJOR DEPRESSIVE DISORDER WITHOUT PRIOR EPISODE (HCC): Primary | ICD-10-CM

## 2023-12-14 DIAGNOSIS — F41.1 GENERALIZED ANXIETY DISORDER: ICD-10-CM

## 2023-12-14 PROCEDURE — 90834 PSYTX W PT 45 MINUTES: CPT | Performed by: SOCIAL WORKER

## 2023-12-14 NOTE — PSYCH
Behavioral Health Psychotherapy Progress Note    Psychotherapy Provided: Individual Psychotherapy     1. Current moderate episode of major depressive disorder without prior episode ([address])        2. Generalized anxiety disorder              Goals addressed in session: goal 1, want to balance out my time with work, school and time for myself and not feel guilty for taking time with myself. DATA:  URIAH met with Brenda  for session. Since last seen she finished up college. Next week grades come out. With graduating that leaves her with only working one full time job. She is used to being very busy with working  part-time 2-3 jobs and school full time or recently working full time and going to school full time. "She has this thing with needing to feel productive."  For now this is new and she is happy and less stressed to not have a lot going on. Discussed this could change after a while. Other "big" news is she got engaged! She is happy about it. It was unexpected. They have been together for 6 years and have lived together for 4 years. She described how he asked and how it wasn't like in the movies. Discussed most engagements aren't like in the movies. During this session, this clinician used the following therapeutic modalities: Client-centered Therapy, Cognitive Behavioral Therapy, Cognitive Processing Therapy, Solution-Focused Therapy and Supportive Psychotherapy    Substance Abuse was not addressed during this session. If the client is diagnosed with a co-occurring substance use disorder, please indicate any changes in the frequency or amount of use: na. Stage of change for addressing substance use diagnoses: No substance use/Not applicable    ASSESSMENT:  Triny Brown presents with a Euthymic/ normal mood. her affect is Normal range and intensity, which is congruent, with her mood and the content of the session. The client has made progress on their goals.      Triny Brown presents with a none risk of suicide, none risk of self-harm, and none risk of harm to others. For any risk assessment that surpasses a "low" rating, a safety plan must be developed. A safety plan was indicated: no  If yes, describe in detail     PLAN: Between sessions, Brenda Lewis will begin addressing new tx plan goals. At the next session, the therapist will use Client-centered Therapy, Cognitive Behavioral Therapy, Cognitive Processing Therapy, Solution-Focused Therapy and Supportive Psychotherapy to address tx plan goals and new issues that arise since last session. .    Behavioral Health Treatment Plan and Discharge Planning: Sonal Lares is aware of and agrees to continue to work on their treatment plan. They have identified and are working toward their discharge goals. yes    Visit start and stop times:    12/14/23  Start Time: 1612  Stop Time: 1700  Total Visit Time: 48 minutes    Virtual Regular Visit    Verification of patient location:    Patient is located at Home in the following state in which I hold an active license [place]      Assessment/Plan:    Problem List Items Addressed This Visit    None    Goals addressed in session: Goal 1          Reason for visit is   No chief complaint on file. Encounter provider Michelle Steele    Provider located at [address] [place] [id number] [phone]      Recent Visits  No visits were found meeting these conditions. Showing recent visits within past 7 days and meeting all other requirements  Today's Visits  Date Type Provider Dept   12/14/23 [id number] Christelle Swift today's visits and meeting all other requirements  Future Appointments  No visits were found meeting these conditions.   Showing future appointments within next 150 days and meeting all other requirements       The patient was identified by name and date of birth. Nasrin Acevedo was informed that this is a telemedicine visit and that the visit is being conducted throughthe [employer] platform. She agrees to proceed. .  My office door was closed. No one else was in the room. She acknowledged consent and understanding of privacy and security of the video platform. The patient has agreed to participate and understands they can discontinue the visit at any time. Patient is aware this is a billable service. Subjective  Brenda Henriquez is a 26 y.o. female  . HPI     Past Medical History:   Diagnosis Date    Acne     Anxiety     Chronic vaginitis 03/15/2017    Concussion     Last Assessed: 11/15/2017    Depression        Past Surgical History:   Procedure Laterality Date    APPENDECTOMY      INSERTION OF INTRAUTERINE DEVICE (IUD) N/A        Current Outpatient Medications   Medication Sig Dispense Refill    albuterol (PROVENTIL HFA,VENTOLIN HFA) 90 mcg/act inhaler Inhale 1-2 puffs  0    Dapsone 7.5 % GEL Apply 1 Application topically in the morning 60 g 2    dicyclomine (BENTYL) 20 mg tablet Take 1 tablet (20 mg total) by mouth every 6 (six) hours 60 tablet 0    ergocalciferol (VITAMIN D2) 50,000 units Take 1 capsule (50,000 Units total) by mouth 2 (two) times a week 24 capsule 0    hydrOXYzine HCL (ATARAX) 25 mg tablet Take 1 tablet (25 mg total) by mouth daily as needed (moderate-severe anxiety) 30 tablet 1    Levonorgestrel (MIRENA) 20 MCG/DAY IUD 1 each by Intrauterine route once      spironolactone (ALDACTONE) 50 mg tablet Take 2 pills (100 mg) daily. Take with large glass of water. STOP IMMEDIATELY IF YOU BECOME UNINTENTIONALLY PREGNANT or if you decide to plan for pregnancy. Avoid large amounts of high potassium food or beverages while taking this medication. 180 tablet 3    venlafaxine (EFFEXOR-XR) 75 mg 24 hr capsule Take 1 capsule (75 mg total) by mouth daily 90 capsule 1     No current facility-administered medications for this visit. No Known Allergies    Review of Systems    Video Exam    There were no vitals filed for this visit.     Physical Exam

## 2023-12-27 ENCOUNTER — TELEMEDICINE (OUTPATIENT)
Dept: BEHAVIORAL/MENTAL HEALTH CLINIC | Facility: CLINIC | Age: 23
End: 2023-12-27
Payer: COMMERCIAL

## 2023-12-27 DIAGNOSIS — F32.1 CURRENT MODERATE EPISODE OF MAJOR DEPRESSIVE DISORDER WITHOUT PRIOR EPISODE (HCC): Primary | ICD-10-CM

## 2023-12-27 DIAGNOSIS — F41.1 GENERALIZED ANXIETY DISORDER: ICD-10-CM

## 2023-12-27 PROCEDURE — 90834 PSYTX W PT 45 MINUTES: CPT | Performed by: SOCIAL WORKER

## 2023-12-27 NOTE — PSYCH
"Behavioral Health Psychotherapy Progress Note    Psychotherapy Provided: Individual Psychotherapy     1. Current moderate episode of major depressive disorder without prior episode (HCC)        2. Generalized anxiety disorder                Goals addressed in session: goal 1    DATA:  MSW met with Brenda  for session.  She is doing good.  She got a $1000 bonus from work.   She is going to spend some of it and save the rest.  \"She has a difficult time spending money on herself.\"  Work is going good but she is having a problem with her boss at work.  She is standing up to him and confronting him.  He handles her confronting him. Reviewed her tx plan goals.  She hs been meeting her tx plan goal.  She has not been bored yet with not having school.  Discussed that she may start getting bored when she is to return to school in January.  She has officially graduated.  There is no winter graduation.  She will walk for her diploma in the spring next year.    During this session, this clinician used the following therapeutic modalities: Client-centered Therapy, Cognitive Behavioral Therapy, Cognitive Processing Therapy, Solution-Focused Therapy and Supportive Psychotherapy    Substance Abuse was not addressed during this session. If the client is diagnosed with a co-occurring substance use disorder, please indicate any changes in the frequency or amount of use: na. Stage of change for addressing substance use diagnoses: No substance use/Not applicable    ASSESSMENT:  Brenda Darden presents with a Euthymic/ normal mood.     her affect is Normal range and intensity, which is congruent, with her mood and the content of the session. The client has made progress on their goals.     Brenda Darden presents with a none risk of suicide, none risk of self-harm, and none risk of harm to others.    For any risk assessment that surpasses a \"low\" rating, a safety plan must be developed.    A safety plan was indicated: no  If yes, " describe in detail     PLAN: Between sessions, Brenda Darden will begin addressing new tx plan goals. At the next session, the therapist will use Client-centered Therapy, Cognitive Behavioral Therapy, Cognitive Processing Therapy, Solution-Focused Therapy and Supportive Psychotherapy to address tx plan goals and new issues that arise since last session..    Behavioral Health Treatment Plan and Discharge Planning: Brenda Darden is aware of and agrees to continue to work on their treatment plan. They have identified and are working toward their discharge goals. yes    Visit start and stop times:    12/27/23  Start Time: 1613  Stop Time: 1700  Total Visit Time: 47 minutes    Virtual Regular Visit    Verification of patient location:    Patient is located at Home in the following state in which I hold an active license [place]      Assessment/Plan:    Problem List Items Addressed This Visit    None    Goals addressed in session: Goal 1          Reason for visit is   No chief complaint on file.       Encounter provider Byron Winn    Provider located at [facility] [place]  [facility] [place]  [address] [place] [address] [phone]      Recent Visits  No visits were found meeting these conditions.  Showing recent visits within past 7 days and meeting all other requirements  Today's Visits  Date Type Provider Dept   12/27/23 Telemedicine Byron Winn [facility] [place]   Showing today's visits and meeting all other requirements  Future Appointments  No visits were found meeting these conditions.  Showing future appointments within next 150 days and meeting all other requirements       The patient was identified by name and date of birth. Brenda Darden was informed that this is a telemedicine visit and that the visit is being conducted throughthe [employer] platform. She agrees to proceed..  My office door was closed. No one else was  in the room.  She acknowledged consent and understanding of privacy and security of the video platform. The patient has agreed to participate and understands they can discontinue the visit at any time.    Patient is aware this is a billable service.     Subjective  Brenda Darden is a 23 y.o. female  .      HPI     Past Medical History:   Diagnosis Date    Acne     Anxiety     Chronic vaginitis 03/15/2017    Concussion     Last Assessed: 11/15/2017    Depression        Past Surgical History:   Procedure Laterality Date    APPENDECTOMY      INSERTION OF INTRAUTERINE DEVICE (IUD) N/A        Current Outpatient Medications   Medication Sig Dispense Refill    albuterol (PROVENTIL HFA,VENTOLIN HFA) 90 mcg/act inhaler Inhale 1-2 puffs  0    Dapsone 7.5 % GEL Apply 1 Application topically in the morning 60 g 2    dicyclomine (BENTYL) 20 mg tablet Take 1 tablet (20 mg total) by mouth every 6 (six) hours 60 tablet 0    ergocalciferol (VITAMIN D2) 50,000 units Take 1 capsule (50,000 Units total) by mouth 2 (two) times a week 24 capsule 0    hydrOXYzine HCL (ATARAX) 25 mg tablet Take 1 tablet (25 mg total) by mouth daily as needed (moderate-severe anxiety) 30 tablet 1    Levonorgestrel (MIRENA) 20 MCG/DAY IUD 1 each by Intrauterine route once      spironolactone (ALDACTONE) 50 mg tablet Take 2 pills (100 mg) daily. Take with large glass of water. STOP IMMEDIATELY IF YOU BECOME UNINTENTIONALLY PREGNANT or if you decide to plan for pregnancy. Avoid large amounts of high potassium food or beverages while taking this medication. 180 tablet 3    venlafaxine (EFFEXOR-XR) 75 mg 24 hr capsule Take 1 capsule (75 mg total) by mouth daily 90 capsule 1     No current facility-administered medications for this visit.        No Known Allergies    Review of Systems    Video Exam    There were no vitals filed for this visit.    Physical Exam

## 2024-01-12 ENCOUNTER — TELEMEDICINE (OUTPATIENT)
Dept: BEHAVIORAL/MENTAL HEALTH CLINIC | Facility: CLINIC | Age: 24
End: 2024-01-12
Payer: COMMERCIAL

## 2024-01-12 DIAGNOSIS — F41.1 GENERALIZED ANXIETY DISORDER: ICD-10-CM

## 2024-01-12 DIAGNOSIS — F32.1 CURRENT MODERATE EPISODE OF MAJOR DEPRESSIVE DISORDER WITHOUT PRIOR EPISODE (HCC): Primary | ICD-10-CM

## 2024-01-12 PROCEDURE — 90834 PSYTX W PT 45 MINUTES: CPT | Performed by: SOCIAL WORKER

## 2024-01-12 NOTE — PSYCH
"Behavioral Health Psychotherapy Progress Note    Psychotherapy Provided: Individual Psychotherapy     1. Current moderate episode of major depressive disorder without prior episode (HCC)        2. Generalized anxiety disorder                  Goals addressed in session: goal 1    DATA:  MSW met with Brenda  for session.  Discussed work.  Her boss is micro managing her.  She wants to do more but he stops it.  In the past she went to him because she was overwhelmed with everything everyone was giving her.  She has not gone back to him to express her wanting to hear form other dept too.  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  She is not in that dept but it is \"one of her top 5 board games of all times.\"  Her one coworker suggested she go to the dept and talk to them.  Brenda discussed the reasons why not and what she feels she could take to them to show them she can.   Discussed her fears. Her not approaching the dept is not because of them, her company is very laid back and positions don't require a degree it is due to \"her anxiety of not being good enough, etc.\"  She is used to being the smartest one in her class in school not being the one who doesn't know as much.          During this session, this clinician used the following therapeutic modalities: Client-centered Therapy, Cognitive Behavioral Therapy, Cognitive Processing Therapy, Solution-Focused Therapy and Supportive Psychotherapy    Substance Abuse was not addressed during this session. If the client is diagnosed with a co-occurring substance use disorder, please indicate any changes in the frequency or amount of use: na. Stage of change for addressing substance use diagnoses: No substance use/Not applicable    ASSESSMENT:  Brenda Darden presents with a Euthymic/ normal mood.     her affect is Normal range and intensity, which is congruent, with her mood and the content of the session. The client has made progress on their goals.     " "Brenda Darden presents with a none risk of suicide, none risk of self-harm, and none risk of harm to others.    For any risk assessment that surpasses a \"low\" rating, a safety plan must be developed.    A safety plan was indicated: no  If yes, describe in detail     PLAN: Between sessions, Brenda Darden will begin addressing new tx plan goals. At the next session, the therapist will use Client-centered Therapy, Cognitive Behavioral Therapy, Cognitive Processing Therapy, Solution-Focused Therapy and Supportive Psychotherapy to address tx plan goals and new issues that arise since last session..    Behavioral Health Treatment Plan and Discharge Planning: Brenda Darden is aware of and agrees to continue to work on their treatment plan. They have identified and are working toward their discharge goals. yes    Visit start and stop times:    01/12/24  Start Time: 1213  Stop Time: 1300  Total Visit Time: 47 minutes    Virtual Regular Visit    Verification of patient location:    Patient is located at Home in the following state in which I hold an active license [place]      Assessment/Plan:    Problem List Items Addressed This Visit    None    Goals addressed in session: Goal 1          Reason for visit is   No chief complaint on file.       Encounter provider Byron Winn    Provider located at [facility] [place]  [facility] [place]  [address]  [place] [address] [phone]      Recent Visits  No visits were found meeting these conditions.  Showing recent visits within past 7 days and meeting all other requirements  Today's Visits  Date Type Provider Dept   01/12/24 Telemedicine Byron Winn [facility] [place]   Showing today's visits and meeting all other requirements  Future Appointments  No visits were found meeting these conditions.  Showing future appointments within next 150 days and meeting all other requirements       The " patient was identified by name and date of birth. Brenda Darden was informed that this is a telemedicine visit and that the visit is being conducted throughthe [employer] platform. She agrees to proceed..  My office door was closed. No one else was in the room.  She acknowledged consent and understanding of privacy and security of the video platform. The patient has agreed to participate and understands they can discontinue the visit at any time.    Patient is aware this is a billable service.     Subjective  Brenda Darden is a 23 y.o. female  .      HPI     Past Medical History:   Diagnosis Date    Acne     Anxiety     Chronic vaginitis 03/15/2017    Concussion     Last Assessed: 11/15/2017    Depression        Past Surgical History:   Procedure Laterality Date    APPENDECTOMY      INSERTION OF INTRAUTERINE DEVICE (IUD) N/A        Current Outpatient Medications   Medication Sig Dispense Refill    albuterol (PROVENTIL HFA,VENTOLIN HFA) 90 mcg/act inhaler Inhale 1-2 puffs  0    Dapsone 7.5 % GEL Apply 1 Application topically in the morning 60 g 2    dicyclomine (BENTYL) 20 mg tablet Take 1 tablet (20 mg total) by mouth every 6 (six) hours 60 tablet 0    ergocalciferol (VITAMIN D2) 50,000 units Take 1 capsule (50,000 Units total) by mouth 2 (two) times a week 24 capsule 0    hydrOXYzine HCL (ATARAX) 25 mg tablet Take 1 tablet (25 mg total) by mouth daily as needed (moderate-severe anxiety) 30 tablet 1    Levonorgestrel (MIRENA) 20 MCG/DAY IUD 1 each by Intrauterine route once      spironolactone (ALDACTONE) 50 mg tablet Take 2 pills (100 mg) daily. Take with large glass of water. STOP IMMEDIATELY IF YOU BECOME UNINTENTIONALLY PREGNANT or if you decide to plan for pregnancy. Avoid large amounts of high potassium food or beverages while taking this medication. 180 tablet 3    venlafaxine (EFFEXOR-XR) 75 mg 24 hr capsule Take 1 capsule (75 mg total) by mouth daily 90 capsule 1     No current facility-administered  medications for this visit.        No Known Allergies    Review of Systems    Video Exam    There were no vitals filed for this visit.    Physical Exam

## 2024-01-25 ENCOUNTER — TELEMEDICINE (OUTPATIENT)
Dept: BEHAVIORAL/MENTAL HEALTH CLINIC | Facility: CLINIC | Age: 24
End: 2024-01-25
Payer: COMMERCIAL

## 2024-01-25 DIAGNOSIS — F32.1 CURRENT MODERATE EPISODE OF MAJOR DEPRESSIVE DISORDER WITHOUT PRIOR EPISODE (HCC): Primary | ICD-10-CM

## 2024-01-25 DIAGNOSIS — F41.1 GENERALIZED ANXIETY DISORDER: ICD-10-CM

## 2024-01-25 PROCEDURE — 90834 PSYTX W PT 45 MINUTES: CPT | Performed by: SOCIAL WORKER

## 2024-01-25 NOTE — PSYCH
"Behavioral Health Psychotherapy Progress Note    Psychotherapy Provided: Individual Psychotherapy     1. Current moderate episode of major depressive disorder without prior episode (HCC)        2. Generalized anxiety disorder                    Goals addressed in session: goal 1    DATA:  MSW met with Brenda  for session.   She has been \"bluh\" the past 1-2 weeks.  She became anger at her boyfriend and was yelling at him.  He realized she was off and called her parents and she yelled at them.  This is not like her and her boyfriend \"didn't do anything for her to yell at him to begin with.\"  She has a lot of work stress which we discussed along with coping skills.  Also, she works from home and doesn't  get out much.  Discussed her getting out of the house more.          During this session, this clinician used the following therapeutic modalities: Client-centered Therapy, Cognitive Behavioral Therapy, Cognitive Processing Therapy, Solution-Focused Therapy and Supportive Psychotherapy    Substance Abuse was not addressed during this session. If the client is diagnosed with a co-occurring substance use disorder, please indicate any changes in the frequency or amount of use: na. Stage of change for addressing substance use diagnoses: No substance use/Not applicable    ASSESSMENT:  Brenda Darden presents with a Euthymic/ normal mood.     her affect is Normal range and intensity, which is congruent, with her mood and the content of the session. The client has made progress on their goals.     Brenda Darden presents with a none risk of suicide, none risk of self-harm, and none risk of harm to others.    For any risk assessment that surpasses a \"low\" rating, a safety plan must be developed.    A safety plan was indicated: no  If yes, describe in detail     PLAN: Between sessions, Brenda Darden will begin addressing new tx plan goals. At the next session, the therapist will use Client-centered Therapy, Cognitive " Behavioral Therapy, Cognitive Processing Therapy, Solution-Focused Therapy and Supportive Psychotherapy to address tx plan goals and new issues that arise since last session..    Behavioral Health Treatment Plan and Discharge Planning: Brenda Darden is aware of and agrees to continue to work on their treatment plan. They have identified and are working toward their discharge goals. yes    Visit start and stop times:    01/25/24  Start Time: 1613  Stop Time: 1700  Total Visit Time: 47 minutes    Virtual Regular Visit    Verification of patient location:    Patient is located at Home in the following state in which I hold an active license [place]      Assessment/Plan:    Problem List Items Addressed This Visit    None    Goals addressed in session: Goal 1          Reason for visit is   No chief complaint on file.       Encounter provider Byron Winn    Provider located at [facility] [place]  [facility] [address] [place] [id number] [phone]      Recent Visits  No visits were found meeting these conditions.  Showing recent visits within past 7 days and meeting all other requirements  Today's Visits  Date Type Provider Dept   01/25/24 Telemedicine Byron Winn [facility] [place]   Showing today's visits and meeting all other requirements  Future Appointments  No visits were found meeting these conditions.  Showing future appointments within next 150 days and meeting all other requirements       The patient was identified by name and date of birth. Brenda Darden was informed that this is a telemedicine visit and that the visit is being conducted throughthe [employer] platform. She agrees to proceed..  My office door was closed. No one else was in the room.  She acknowledged consent and understanding of privacy and security of the video platform. The patient has agreed to participate and understands they can discontinue the  visit at any time.    Patient is aware this is a billable service.     Subjective  Brenda Darden is a 23 y.o. female  .      HPI     Past Medical History:   Diagnosis Date    Acne     Anxiety     Chronic vaginitis 03/15/2017    Concussion     Last Assessed: 11/15/2017    Depression        Past Surgical History:   Procedure Laterality Date    APPENDECTOMY      INSERTION OF INTRAUTERINE DEVICE (IUD) N/A        Current Outpatient Medications   Medication Sig Dispense Refill    albuterol (PROVENTIL HFA,VENTOLIN HFA) 90 mcg/act inhaler Inhale 1-2 puffs  0    Dapsone 7.5 % GEL Apply 1 Application topically in the morning 60 g 2    dicyclomine (BENTYL) 20 mg tablet Take 1 tablet (20 mg total) by mouth every 6 (six) hours 60 tablet 0    ergocalciferol (VITAMIN D2) 50,000 units Take 1 capsule (50,000 Units total) by mouth 2 (two) times a week 24 capsule 0    hydrOXYzine HCL (ATARAX) 25 mg tablet Take 1 tablet (25 mg total) by mouth daily as needed (moderate-severe anxiety) 30 tablet 1    Levonorgestrel (MIRENA) 20 MCG/DAY IUD 1 each by Intrauterine route once      spironolactone (ALDACTONE) 50 mg tablet Take 2 pills (100 mg) daily. Take with large glass of water. STOP IMMEDIATELY IF YOU BECOME UNINTENTIONALLY PREGNANT or if you decide to plan for pregnancy. Avoid large amounts of high potassium food or beverages while taking this medication. 180 tablet 3    venlafaxine (EFFEXOR-XR) 75 mg 24 hr capsule Take 1 capsule (75 mg total) by mouth daily 90 capsule 1     No current facility-administered medications for this visit.        No Known Allergies    Review of Systems    Video Exam    There were no vitals filed for this visit.    Physical Exam

## 2024-02-06 ENCOUNTER — TELEMEDICINE (OUTPATIENT)
Dept: BEHAVIORAL/MENTAL HEALTH CLINIC | Facility: CLINIC | Age: 24
End: 2024-02-06
Payer: COMMERCIAL

## 2024-02-06 DIAGNOSIS — F32.1 CURRENT MODERATE EPISODE OF MAJOR DEPRESSIVE DISORDER WITHOUT PRIOR EPISODE (HCC): Primary | ICD-10-CM

## 2024-02-06 DIAGNOSIS — F41.1 GENERALIZED ANXIETY DISORDER: ICD-10-CM

## 2024-02-06 PROCEDURE — 90834 PSYTX W PT 45 MINUTES: CPT | Performed by: SOCIAL WORKER

## 2024-02-06 NOTE — PSYCH
Behavioral Health Psychotherapy Progress Note    Psychotherapy Provided: Individual Psychotherapy     1. Current moderate episode of major depressive disorder without prior episode (HCC)        2. Generalized anxiety disorder                      Goals addressed in session: goal 1    DATA:  MSW met with Brenda  for session.    Her tx plan is due later this month.  She did not want to do it today because she had things she wanted to discuss.  First was guilt she felt over something that happened at work which she had not part in.  The second was she is having cravings to smoke again (she has almost 2 yrs without smoking) and she is drinking more than she used to.  She has had some weekends where she will have a couple drinks while sitting on the couch. URIAH does not think there is an issue with her drinking but did discussed that is how it starts.  She thinks she is drinking more than she used to which wasn't much at all in the past due to increased stress.  URIAH also brought to her attention that she would of returned to school last month and she didn't.  School had been a big part of her life and routine.  Discussed coping skills.  Will develop tx plan next session.        During this session, this clinician used the following therapeutic modalities: Client-centered Therapy, Cognitive Behavioral Therapy, Cognitive Processing Therapy, Solution-Focused Therapy and Supportive Psychotherapy    Substance Abuse was not addressed during this session. If the client is diagnosed with a co-occurring substance use disorder, please indicate any changes in the frequency or amount of use: na. Stage of change for addressing substance use diagnoses: No substance use/Not applicable    ASSESSMENT:  Brenda Darden presents with a Euthymic/ normal mood.     her affect is Normal range and intensity, which is congruent, with her mood and the content of the session. The client has made progress on their goals.     Brenda Darden  "presents with a none risk of suicide, none risk of self-harm, and none risk of harm to others.    For any risk assessment that surpasses a \"low\" rating, a safety plan must be developed.    A safety plan was indicated: no  If yes, describe in detail     PLAN: Between sessions, Brenda Darden will begin addressing new tx plan goals. At the next session, the therapist will use Client-centered Therapy, Cognitive Behavioral Therapy, Cognitive Processing Therapy, Solution-Focused Therapy and Supportive Psychotherapy to address tx plan goals and new issues that arise since last session..    Behavioral Health Treatment Plan and Discharge Planning: Brenda Darden is aware of and agrees to continue to work on their treatment plan. They have identified and are working toward their discharge goals. yes    Visit start and stop times:    02/06/24  Start Time: 1613  Stop Time: 1700  Total Visit Time: 47 minutes    Virtual Regular Visit    Verification of patient location:    Patient is located at Home in the following state in which I hold an active license [place]      Assessment/Plan:    Problem List Items Addressed This Visit    None    Goals addressed in session: Goal 1          Reason for visit is   No chief complaint on file.       Encounter provider Byron Winn    Provider located at [facility]  [facility] [address] [place] [address] [phone]      Recent Visits  No visits were found meeting these conditions.  Showing recent visits within past 7 days and meeting all other requirements  Today's Visits  Date Type Provider Dept   02/06/24 Telemedicine [facility] [place]   Showing today's visits and meeting all other requirements  Future Appointments  No visits were found meeting these conditions.  Showing future appointments within next 150 days and meeting all other requirements       The patient was identified " by name and date of birth. Brenda Darden was informed that this is a telemedicine visit and that the visit is being conducted throughthe [employer] platform. She agrees to proceed..  My office door was closed. No one else was in the room.  She acknowledged consent and understanding of privacy and security of the video platform. The patient has agreed to participate and understands they can discontinue the visit at any time.    Patient is aware this is a billable service.     Subjective  Brenda Darden is a 23 y.o. female  .      HPI     Past Medical History:   Diagnosis Date    Acne     Anxiety     Chronic vaginitis 03/15/2017    Concussion     Last Assessed: 11/15/2017    Depression        Past Surgical History:   Procedure Laterality Date    APPENDECTOMY      INSERTION OF INTRAUTERINE DEVICE (IUD) N/A        Current Outpatient Medications   Medication Sig Dispense Refill    albuterol (PROVENTIL HFA,VENTOLIN HFA) 90 mcg/act inhaler Inhale 1-2 puffs  0    Dapsone 7.5 % GEL Apply 1 Application topically in the morning 60 g 2    dicyclomine (BENTYL) 20 mg tablet Take 1 tablet (20 mg total) by mouth every 6 (six) hours 60 tablet 0    ergocalciferol (VITAMIN D2) 50,000 units Take 1 capsule (50,000 Units total) by mouth 2 (two) times a week 24 capsule 0    hydrOXYzine HCL (ATARAX) 25 mg tablet Take 1 tablet (25 mg total) by mouth daily as needed (moderate-severe anxiety) 30 tablet 1    Levonorgestrel (MIRENA) 20 MCG/DAY IUD 1 each by Intrauterine route once      spironolactone (ALDACTONE) 50 mg tablet Take 2 pills (100 mg) daily. Take with large glass of water. STOP IMMEDIATELY IF YOU BECOME UNINTENTIONALLY PREGNANT or if you decide to plan for pregnancy. Avoid large amounts of high potassium food or beverages while taking this medication. 180 tablet 3    venlafaxine (EFFEXOR-XR) 75 mg 24 hr capsule Take 1 capsule (75 mg total) by mouth daily 90 capsule 1     No current facility-administered medications for this  visit.        No Known Allergies    Review of Systems    Video Exam    There were no vitals filed for this visit.    Physical Exam

## 2024-02-20 ENCOUNTER — TELEMEDICINE (OUTPATIENT)
Dept: BEHAVIORAL/MENTAL HEALTH CLINIC | Facility: CLINIC | Age: 24
End: 2024-02-20
Payer: COMMERCIAL

## 2024-02-20 DIAGNOSIS — F32.1 CURRENT MODERATE EPISODE OF MAJOR DEPRESSIVE DISORDER WITHOUT PRIOR EPISODE (HCC): Primary | ICD-10-CM

## 2024-02-20 DIAGNOSIS — F41.1 GENERALIZED ANXIETY DISORDER: ICD-10-CM

## 2024-02-20 PROCEDURE — 90834 PSYTX W PT 45 MINUTES: CPT | Performed by: SOCIAL WORKER

## 2024-02-20 NOTE — PSYCH
"Behavioral Health Psychotherapy Progress Note    Psychotherapy Provided: Individual Psychotherapy     1. Current moderate episode of major depressive disorder without prior episode (HCC)        2. Generalized anxiety disorder                      Goals addressed in session: goal 1    DATA:  MSW met with Brenda  for session.    Discussed work.  She is having a difficult time with her boss \"micro-managing\"her.  \"If she doesn't get a raise she is going to look for another job.  She hopes to not have to look because she doesn't like change.\"  Developed tx plan and safely plan.    During this session, this clinician used the following therapeutic modalities: Client-centered Therapy, Cognitive Behavioral Therapy, Cognitive Processing Therapy, Solution-Focused Therapy and Supportive Psychotherapy    Substance Abuse was not addressed during this session. If the client is diagnosed with a co-occurring substance use disorder, please indicate any changes in the frequency or amount of use: na. Stage of change for addressing substance use diagnoses: No substance use/Not applicable    ASSESSMENT:  Brenda Darden presents with a Euthymic/ normal mood.     her affect is Normal range and intensity, which is congruent, with her mood and the content of the session. The client has made progress on their goals.     Brenda Darden presents with a none risk of suicide, none risk of self-harm, and none risk of harm to others.    For any risk assessment that surpasses a \"low\" rating, a safety plan must be developed.    A safety plan was indicated: no  If yes, describe in detail     PLAN: Between sessions, Brenda Darden will begin addressing new tx plan goals. At the next session, the therapist will use Client-centered Therapy, Cognitive Behavioral Therapy, Cognitive Processing Therapy, Solution-Focused Therapy and Supportive Psychotherapy to address tx plan goals and new issues that arise since last session..    Behavioral Health " Treatment Plan and Discharge Planning: Brenda Darden is aware of and agrees to continue to work on their treatment plan. They have identified and are working toward their discharge goals. yes    Visit start and stop times:    02/20/24  Start Time: 1615  Stop Time: 1700  Total Visit Time: 45 minutes    Virtual Regular Visit    Verification of patient location:    Patient is located at Home in the following state in which I hold an active license [place]      Assessment/Plan:    Problem List Items Addressed This Visit       Generalized anxiety disorder    Current moderate episode of major depressive disorder without prior episode (HCC) - Primary     Goals addressed in session: Goal 1          Reason for visit is   No chief complaint on file.       Encounter provider Byron Winn    Provider located at [facility]  [facility] [place]  [address] [place] [address] [phone]      Recent Visits  No visits were found meeting these conditions.  Showing recent visits within past 7 days and meeting all other requirements  Today's Visits  Date Type Provider Dept   02/20/24 Telemedicine [facility] [place]   Showing today's visits and meeting all other requirements  Future Appointments  No visits were found meeting these conditions.  Showing future appointments within next 150 days and meeting all other requirements       The patient was identified by name and date of birth. Brenda Darden was informed that this is a telemedicine visit and that the visit is being conducted throughthe [employer] platform. She agrees to proceed..  My office door was closed. No one else was in the room.  She acknowledged consent and understanding of privacy and security of the video platform. The patient has agreed to participate and understands they can discontinue the visit at any time.    Patient is aware this is a billable service.      Subjective  Brenda Darden is a 23 y.o. female  .      HPI     Past Medical History:   Diagnosis Date    Acne     Anxiety     Chronic vaginitis 03/15/2017    Concussion     Last Assessed: 11/15/2017    Depression        Past Surgical History:   Procedure Laterality Date    APPENDECTOMY      INSERTION OF INTRAUTERINE DEVICE (IUD) N/A        Current Outpatient Medications   Medication Sig Dispense Refill    albuterol (PROVENTIL HFA,VENTOLIN HFA) 90 mcg/act inhaler Inhale 1-2 puffs  0    Dapsone 7.5 % GEL Apply 1 Application topically in the morning 60 g 2    dicyclomine (BENTYL) 20 mg tablet Take 1 tablet (20 mg total) by mouth every 6 (six) hours 60 tablet 0    ergocalciferol (VITAMIN D2) 50,000 units Take 1 capsule (50,000 Units total) by mouth 2 (two) times a week 24 capsule 0    hydrOXYzine HCL (ATARAX) 25 mg tablet Take 1 tablet (25 mg total) by mouth daily as needed (moderate-severe anxiety) 30 tablet 1    Levonorgestrel (MIRENA) 20 MCG/DAY IUD 1 each by Intrauterine route once      spironolactone (ALDACTONE) 50 mg tablet Take 2 pills (100 mg) daily. Take with large glass of water. STOP IMMEDIATELY IF YOU BECOME UNINTENTIONALLY PREGNANT or if you decide to plan for pregnancy. Avoid large amounts of high potassium food or beverages while taking this medication. 180 tablet 3    venlafaxine (EFFEXOR-XR) 75 mg 24 hr capsule Take 1 capsule (75 mg total) by mouth daily 90 capsule 1     No current facility-administered medications for this visit.        No Known Allergies    Review of Systems    Video Exam    There were no vitals filed for this visit.    Physical Exam

## 2024-02-20 NOTE — BH CRISIS PLAN
Client Name: Brenda Darden       Client YOB: 2000    [facility] Safety Plan      Creation Date: 2/20/24    Created By: Byron Winn       Step 1: Warning Signs:   Warning Signs   When I don't sleep well consistently.            Step 2: Internal Coping Strategies:   Internal Coping Strategies   watching YouTube or TV            Step 3: People and social settings that provide distraction:   Name Contact Information   Atul bianchi with   Dad has #   friend Sunitha has #    Places   my parentst house           Step 4: People whom I can ask for help during a crisis:      Name Contact Information    Atul bianchi with    dad has #    Sunitha canales #      Step 5: Professionals or agencies I can contact during a crisis:      Clinican/Agency Name Phone Emergency Contact    [facility] [place] has #       Local Emergency Department Emergency Department Phone Emergency Department Address    [facility] [address]         Crisis Phone Numbers:   Suicide Prevention Lifeline: Call or Text  741 Crisis Text Line: Text HOME to 383-326   Please note: Some [facility] do not have a separate number for Child/Adolescent specific crisis. If your county is not listed under Child/Adolescent, please call the adult number for your county      Adult Crisis Numbers: Child/Adolescent Crisis Numbers   [facility]: [phone] [facility]: [phone]   [facility]: [phone] [facility]: [phone]   [facility]: [phone] [place], [place]: [phone]   [facility]: [phone] [place]: [phone]   [facility]: [phone]   [facility]: [phone]   [facility]: [phone]   [facility]: [phone] (daytime) [phone] (after hours, weekends, holidays)      Step 6: Making the environment safer (plan for lethal means safety):   Plan: No accessAtul     Optional: What is most important to me and worth living for?   Atul, family and friends      [facility] Safety Plan. Светлана Oro and Deon Sharp. Used with permission of the authors.

## 2024-02-20 NOTE — BH TREATMENT PLAN
"Outpatient Behavioral Health Psychotherapy Treatment Plan    Brenda Darden  2000     Date of Initial Psychotherapy Assessment: 6/16/22  Date of Current Treatment Plan: 02/20/24  Treatment Plan Target Date: 8/24  Treatment Plan Expiration Date: 8/24    Diagnosis:   1. Current moderate episode of major depressive disorder without prior episode (HCC)        2. Generalized anxiety disorder            Area(s) of Need: My anxiety isn't to where I can't function like it was before but it still get really anxious.    Long Term Goal 1:   I want to decrease my anxiety even more.    Stage of Change: Action    Target Date for completion: 8/24     Anticipated therapeutic modalities: CBT     People identified to complete this goal: Brenda      Objective 1:  I will utilize my support system.     I will read.     I will pay attention to \"what # my anxiety is at\" and when it starts to creep above a 3 I will employ more coping skills for anxiety.     I will come up with a list of things I can do when my \"#\" for my anxiety starts to rise.          Long Term Goal 2 (in the client's own words):     Stage of Change: Preparation    Target Date for completion:      Anticipated therapeutic modalities:      People identified to complete this goal:       Objective 1: (identify the means of measuring success in meeting the objective):       Objective 2: (identify the means of measuring success in meeting the objective):      Long Term Goal 3 (in the client's own words):     Stage of Change: Preparation    Target Date for completion:      Anticipated therapeutic modalities:      People identified to complete this goal:       Objective 1: (identify the means of measuring success in meeting the objective):      Objective 2: (identify the means of measuring success in meeting the objective):      I am currently under the care of a [facility] psychiatric provider: yes    My [facility] psychiatric provider is: Dr. Conley    I am currently " taking psychiatric medications: Yes, as prescribed    I feel that I will be ready for discharge from mental health care when I reach the following (measurable goal/objective): When I meet all my goals and have no further goals to address on a tx plan.    For children and adults who have a legal guardian:   Has there been any change to custody orders and/or guardianship status? NA. If yes, attach updated documentation.    I have updated my Crisis Plan and have been offered a copy of this plan    Behavioral Health Treatment Plan [facility]: Diagnosis and Treatment Plan explained to Brenda Darden acknowledges an understanding of their diagnosis. Brenda Darden agrees to this treatment plan.    I have been offered a copy of this Treatment Plan. yes

## 2024-03-01 ENCOUNTER — APPOINTMENT (OUTPATIENT)
Age: 24
End: 2024-03-01
Payer: COMMERCIAL

## 2024-03-01 DIAGNOSIS — R76.8 POSITIVE ANA (ANTINUCLEAR ANTIBODY): ICD-10-CM

## 2024-03-01 LAB
ALBUMIN SERPL BCP-MCNC: 4.8 G/DL (ref 3.5–5)
ALP SERPL-CCNC: 87 U/L (ref 34–104)
ALT SERPL W P-5'-P-CCNC: 13 U/L (ref 7–52)
ANION GAP SERPL CALCULATED.3IONS-SCNC: 8 MMOL/L
AST SERPL W P-5'-P-CCNC: 16 U/L (ref 13–39)
BACTERIA UR QL AUTO: NORMAL /HPF
BASOPHILS # BLD AUTO: 0.05 THOUSANDS/ÂΜL (ref 0–0.1)
BASOPHILS NFR BLD AUTO: 1 % (ref 0–1)
BILIRUB SERPL-MCNC: 0.52 MG/DL (ref 0.2–1)
BILIRUB UR QL STRIP: NEGATIVE
BUN SERPL-MCNC: 8 MG/DL (ref 5–25)
C3 SERPL-MCNC: 141 MG/DL (ref 87–200)
C4 SERPL-MCNC: 44 MG/DL (ref 19–52)
CALCIUM SERPL-MCNC: 9.4 MG/DL (ref 8.4–10.2)
CHLORIDE SERPL-SCNC: 102 MMOL/L (ref 96–108)
CLARITY UR: CLEAR
CO2 SERPL-SCNC: 29 MMOL/L (ref 21–32)
COLOR UR: NORMAL
CREAT SERPL-MCNC: 0.83 MG/DL (ref 0.6–1.3)
CREAT UR-MCNC: 119.7 MG/DL
CRP SERPL QL: <1 MG/L
EOSINOPHIL # BLD AUTO: 0.12 THOUSAND/ÂΜL (ref 0–0.61)
EOSINOPHIL NFR BLD AUTO: 2 % (ref 0–6)
ERYTHROCYTE [DISTWIDTH] IN BLOOD BY AUTOMATED COUNT: 11.2 % (ref 11.6–15.1)
ERYTHROCYTE [SEDIMENTATION RATE] IN BLOOD: 8 MM/HOUR (ref 0–19)
GFR SERPL CREATININE-BSD FRML MDRD: 100 ML/MIN/1.73SQ M
GLUCOSE P FAST SERPL-MCNC: 86 MG/DL (ref 65–99)
GLUCOSE UR STRIP-MCNC: NEGATIVE MG/DL
HCT VFR BLD AUTO: 43.5 % (ref 34.8–46.1)
HGB BLD-MCNC: 14.3 G/DL (ref 11.5–15.4)
HGB UR QL STRIP.AUTO: NEGATIVE
IMM GRANULOCYTES # BLD AUTO: 0.01 THOUSAND/UL (ref 0–0.2)
IMM GRANULOCYTES NFR BLD AUTO: 0 % (ref 0–2)
KETONES UR STRIP-MCNC: NEGATIVE MG/DL
LEUKOCYTE ESTERASE UR QL STRIP: NEGATIVE
LYMPHOCYTES # BLD AUTO: 2.54 THOUSANDS/ÂΜL (ref 0.6–4.47)
LYMPHOCYTES NFR BLD AUTO: 44 % (ref 14–44)
MCH RBC QN AUTO: 29.9 PG (ref 26.8–34.3)
MCHC RBC AUTO-ENTMCNC: 32.9 G/DL (ref 31.4–37.4)
MCV RBC AUTO: 91 FL (ref 82–98)
MONOCYTES # BLD AUTO: 0.52 THOUSAND/ÂΜL (ref 0.17–1.22)
MONOCYTES NFR BLD AUTO: 9 % (ref 4–12)
NEUTROPHILS # BLD AUTO: 2.58 THOUSANDS/ÂΜL (ref 1.85–7.62)
NEUTS SEG NFR BLD AUTO: 44 % (ref 43–75)
NITRITE UR QL STRIP: NEGATIVE
NON-SQ EPI CELLS URNS QL MICRO: NORMAL /HPF
NRBC BLD AUTO-RTO: 0 /100 WBCS
PH UR STRIP.AUTO: 7 [PH]
PLATELET # BLD AUTO: 361 THOUSANDS/UL (ref 149–390)
PMV BLD AUTO: 9.1 FL (ref 8.9–12.7)
POTASSIUM SERPL-SCNC: 4.6 MMOL/L (ref 3.5–5.3)
PROT SERPL-MCNC: 7.2 G/DL (ref 6.4–8.4)
PROT UR STRIP-MCNC: NEGATIVE MG/DL
PROT UR-MCNC: 6 MG/DL
PROT/CREAT UR: 0.05 MG/G{CREAT} (ref 0–0.1)
RBC # BLD AUTO: 4.78 MILLION/UL (ref 3.81–5.12)
RBC #/AREA URNS AUTO: NORMAL /HPF
SODIUM SERPL-SCNC: 139 MMOL/L (ref 135–147)
SP GR UR STRIP.AUTO: 1.02 (ref 1–1.03)
UROBILINOGEN UR STRIP-ACNC: <2 MG/DL
WBC # BLD AUTO: 5.82 THOUSAND/UL (ref 4.31–10.16)
WBC #/AREA URNS AUTO: NORMAL /HPF

## 2024-03-01 PROCEDURE — 80053 COMPREHEN METABOLIC PANEL: CPT

## 2024-03-01 PROCEDURE — 86160 COMPLEMENT ANTIGEN: CPT

## 2024-03-01 PROCEDURE — 86225 DNA ANTIBODY NATIVE: CPT

## 2024-03-01 PROCEDURE — 85652 RBC SED RATE AUTOMATED: CPT

## 2024-03-01 PROCEDURE — 36415 COLL VENOUS BLD VENIPUNCTURE: CPT

## 2024-03-01 PROCEDURE — 85025 COMPLETE CBC W/AUTO DIFF WBC: CPT

## 2024-03-01 PROCEDURE — 86140 C-REACTIVE PROTEIN: CPT

## 2024-03-02 LAB — DSDNA AB SER-ACNC: <1 IU/ML (ref 0–9)

## 2024-03-04 DIAGNOSIS — R11.2 NAUSEA AND VOMITING, UNSPECIFIED VOMITING TYPE: Primary | ICD-10-CM

## 2024-03-04 RX ORDER — ONDANSETRON 4 MG/1
4 TABLET, ORALLY DISINTEGRATING ORAL EVERY 6 HOURS PRN
Qty: 30 TABLET | Refills: 1 | Status: SHIPPED | OUTPATIENT
Start: 2024-03-04

## 2024-03-05 ENCOUNTER — TELEPHONE (OUTPATIENT)
Dept: PSYCHIATRY | Facility: CLINIC | Age: 24
End: 2024-03-05

## 2024-03-05 NOTE — TELEPHONE ENCOUNTER
Patient is calling regarding cancelling an appointment.    Date/Time: 3/8/24 at 4 pm    Reason: Travelling out of town     Patient was rescheduled: YES [] NO [x]  If yes, when was Patient reschedule for:     Patient requesting call back to reschedule: YES [] NO [x]

## 2024-03-19 ENCOUNTER — TELEMEDICINE (OUTPATIENT)
Dept: BEHAVIORAL/MENTAL HEALTH CLINIC | Facility: CLINIC | Age: 24
End: 2024-03-19
Payer: COMMERCIAL

## 2024-03-19 DIAGNOSIS — F32.1 CURRENT MODERATE EPISODE OF MAJOR DEPRESSIVE DISORDER WITHOUT PRIOR EPISODE (HCC): Primary | ICD-10-CM

## 2024-03-19 DIAGNOSIS — F41.1 GENERALIZED ANXIETY DISORDER: ICD-10-CM

## 2024-03-19 PROCEDURE — 90837 PSYTX W PT 60 MINUTES: CPT | Performed by: SOCIAL WORKER

## 2024-03-19 NOTE — PSYCH
"Behavioral Health Psychotherapy Progress Note    Psychotherapy Provided: Individual Psychotherapy     1. Current moderate episode of major depressive disorder without prior episode (HCC)        2. Generalized anxiety disorder                        Goals addressed in session: goal 1    DATA:  MSW met with Brenda  for session.    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  She chose to do it and got a lot of over time hrs for it.  The one boss brought up about her position in the company and creating a position around her strengths.  They are going to talk about it next month when she has been with a company for a yr.  Discussed pay.  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  She wants to get enough but not ask for too much.  Discussed how you ask for what she is worth.          During this session, this clinician used the following therapeutic modalities: Client-centered Therapy, Cognitive Behavioral Therapy, Cognitive Processing Therapy, Solution-Focused Therapy and Supportive Psychotherapy    Substance Abuse was not addressed during this session. If the client is diagnosed with a co-occurring substance use disorder, please indicate any changes in the frequency or amount of use: na. Stage of change for addressing substance use diagnoses: No substance use/Not applicable    ASSESSMENT:  Brenda Darden presents with a Euthymic/ normal mood.     her affect is Normal range and intensity, which is congruent, with her mood and the content of the session. The client has made progress on their goals.     Brenda Darden presents with a none risk of suicide, none risk of self-harm, and none risk of harm to others.    For any risk assessment that surpasses a \"low\" rating, a safety plan must be developed.    A safety plan was indicated: no  If yes, describe in detail     PLAN: Between sessions, Brenda MADDEN" Adelso will begin addressing new tx plan goals. At the next session, the therapist will use Client-centered Therapy, Cognitive Behavioral Therapy, Cognitive Processing Therapy, Solution-Focused Therapy and Supportive Psychotherapy to address tx plan goals and new issues that arise since last session..    Behavioral Health Treatment Plan and Discharge Planning: Brenda Darden is aware of and agrees to continue to work on their treatment plan. They have identified and are working toward their discharge goals. yes    Visit start and stop times:    03/19/24  Start Time: 1612  Stop Time: 1705  Total Visit Time: 53 minutes    Virtual Regular Visit    Verification of patient location:    Patient is located at Home in the following state in which I hold an active license [place]      Assessment/Plan:    Problem List Items Addressed This Visit    None      Goals addressed in session: Goal 1          Reason for visit is   No chief complaint on file.       Encounter provider Byron Winn    Provider located at [facility] [place]  [facility] [place]  [address] [place] [id number] [phone]      Recent Visits  No visits were found meeting these conditions.  Showing recent visits within past 7 days and meeting all other requirements  Today's Visits  Date Type Provider Dept   03/19/24 Telemedicine [facility] [place]   Showing today's visits and meeting all other requirements  Future Appointments  No visits were found meeting these conditions.  Showing future appointments within next 150 days and meeting all other requirements       The patient was identified by name and date of birth. Brenda Darden was informed that this is a telemedicine visit and that the visit is being conducted throughthe [employer] platform. She agrees to proceed..  My office door was closed. No one else was in the room.  She acknowledged consent and  understanding of privacy and security of the video platform. The patient has agreed to participate and understands they can discontinue the visit at any time.    Patient is aware this is a billable service.     Subjective  Brenda Darden is a 23 y.o. female  .      HPI     Past Medical History:   Diagnosis Date    Acne     Anxiety     Chronic vaginitis 03/15/2017    Concussion     Last Assessed: 11/15/2017    Depression        Past Surgical History:   Procedure Laterality Date    APPENDECTOMY      INSERTION OF INTRAUTERINE DEVICE (IUD) N/A        Current Outpatient Medications   Medication Sig Dispense Refill    albuterol (PROVENTIL HFA,VENTOLIN HFA) 90 mcg/act inhaler Inhale 1-2 puffs  0    Dapsone 7.5 % GEL Apply 1 Application topically in the morning 60 g 2    dicyclomine (BENTYL) 20 mg tablet Take 1 tablet (20 mg total) by mouth every 6 (six) hours 60 tablet 0    ergocalciferol (VITAMIN D2) 50,000 units Take 1 capsule (50,000 Units total) by mouth 2 (two) times a week 24 capsule 0    hydrOXYzine HCL (ATARAX) 25 mg tablet Take 1 tablet (25 mg total) by mouth daily as needed (moderate-severe anxiety) 30 tablet 1    Levonorgestrel (MIRENA) 20 MCG/DAY IUD 1 each by Intrauterine route once      ondansetron (ZOFRAN-ODT) 4 mg disintegrating tablet Take 1 tablet (4 mg total) by mouth every 6 (six) hours as needed for nausea or vomiting 30 tablet 1    spironolactone (ALDACTONE) 50 mg tablet Take 2 pills (100 mg) daily. Take with large glass of water. STOP IMMEDIATELY IF YOU BECOME UNINTENTIONALLY PREGNANT or if you decide to plan for pregnancy. Avoid large amounts of high potassium food or beverages while taking this medication. 180 tablet 3    venlafaxine (EFFEXOR-XR) 75 mg 24 hr capsule Take 1 capsule (75 mg total) by mouth daily 90 capsule 1     No current facility-administered medications for this visit.        No Known Allergies    Review of Systems    Video Exam    There were no vitals filed for this  visit.    Physical Exam

## 2024-03-26 ENCOUNTER — TELEPHONE (OUTPATIENT)
Dept: PSYCHIATRY | Facility: CLINIC | Age: 24
End: 2024-03-26

## 2024-03-26 NOTE — TELEPHONE ENCOUNTER
Patient left VM to cx all future appts. Writer returned VM and cx appts. Patient stated that she was happy with provider but found a provider local so she would not have to do virtual. Writer will inform provider.       Patient is calling regarding cancelling an appointment.    Date/Time: 4/2/24 at 4pm    Reason: Patient found another provider that is local    Patient was rescheduled: YES [] NO [x]  If yes, when was Patient reschedule for: n/a    Patient requesting call back to reschedule: YES [] NO [x]      Patient is calling regarding cancelling an appointment.    Date/Time: 4/2/24 at 4pm    Reason: Patient found another provider that is local    Patient was rescheduled: YES [] NO [x]  If yes, when was Patient reschedule for: n/a    Patient requesting call back to reschedule: YES [] NO [x]      Patient is calling regarding cancelling an appointment.    Date/Time: 4/18/24 at 4pm    Reason: Patient found another provider that is local    Patient was rescheduled: YES [] NO [x]  If yes, when was Patient reschedule for: n/a    Patient requesting call back to reschedule: YES [] NO [x]      Patient is calling regarding cancelling an appointment.    Date/Time: 5/1/24 at 5pm    Reason: Patient found another provider that is local    Patient was rescheduled: YES [] NO [x]  If yes, when was Patient reschedule for: n/a    Patient requesting call back to reschedule: YES [] NO [x]      Patient is calling regarding cancelling an appointment.    Date/Time: 5/14/24 at 5pm    Reason: Patient found another provider that is local    Patient was rescheduled: YES [] NO [x]  If yes, when was Patient reschedule for: n/a    Patient requesting call back to reschedule: YES [] NO [x]

## 2024-03-27 ENCOUNTER — TELEPHONE (OUTPATIENT)
Dept: PSYCHIATRY | Facility: CLINIC | Age: 24
End: 2024-03-27

## 2024-03-28 ENCOUNTER — DOCUMENTATION (OUTPATIENT)
Dept: BEHAVIORAL/MENTAL HEALTH CLINIC | Facility: CLINIC | Age: 24
End: 2024-03-28

## 2024-03-28 DIAGNOSIS — F32.1 CURRENT MODERATE EPISODE OF MAJOR DEPRESSIVE DISORDER WITHOUT PRIOR EPISODE (HCC): Primary | ICD-10-CM

## 2024-03-28 DIAGNOSIS — F41.1 GENERALIZED ANXIETY DISORDER: ICD-10-CM

## 2024-03-28 NOTE — PROGRESS NOTES
"Psychotherapy Discharge Summary    Preferred Name: Brenda Darden  YOB: 2000    Admission date to psychotherapy: 6/16/22      Referred by: self     Presenting Problem: Hx of depression and anxiety.  Has a history of different therapist and falling outs with them.  She wanted to get back into therapy.    Course of treatment included : individual therapy     Progress/Outcome of Treatment Goals (brief summary of course of treatment) Throughout tx Brenda make progress.  She graduated college and has only one job which she likes instead of 3 times when in college.  Her depression and her anxiety have decreased and relationship with boyfriend improved.    Treatment Complications (if any): none    Treatment Progress: good    Current [facility] Provider: Dr. Conley    Discharge Medications include: Effexor XR, Atarax    Discharge Date: 326/24    Discharge Diagnosis:   1. Current moderate episode of major depressive disorder without prior episode (HCC)        2. Generalized anxiety disorder            Criteria for Discharge:  Brenda called and cx all pending appts stating, \"She was happy with provider but found a provider local...\"    Aftercare recommendations include (include specific referral names and phone numbers, if appropriate): Continue follow up with DR for meds.    Prognosis: good    "
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
## 2024-04-02 ENCOUNTER — TELEMEDICINE (OUTPATIENT)
Dept: PSYCHIATRY | Facility: CLINIC | Age: 24
End: 2024-04-02

## 2024-04-02 DIAGNOSIS — F41.1 GENERALIZED ANXIETY DISORDER: Primary | ICD-10-CM

## 2024-04-02 DIAGNOSIS — F32.4 MAJOR DEPRESSIVE DISORDER WITH SINGLE EPISODE, IN PARTIAL REMISSION (HCC): ICD-10-CM

## 2024-04-02 PROCEDURE — NC001 PR NO CHARGE: Performed by: PSYCHIATRY & NEUROLOGY

## 2024-04-02 RX ORDER — VENLAFAXINE HYDROCHLORIDE 75 MG/1
75 CAPSULE, EXTENDED RELEASE ORAL DAILY
Qty: 90 CAPSULE | Refills: 1 | Status: SHIPPED | OUTPATIENT
Start: 2024-04-02 | End: 2024-09-29

## 2024-04-02 NOTE — BH CRISIS PLAN
Client Name: Brenda Darden       Client YOB: 2000    [place] Safety Plan      Creation Date: 2/20/24 Update Date: 4/2/24   Created By: Byron Winn Last Updated By: Jarod Conley DO      Step 1: Warning Signs:   Warning Signs   When I don't sleep well consistently.   more prone to being agitated            Step 2: Internal Coping Strategies:   Internal Coping Strategies   watching YouTube or TV   video games   read            Step 3: People and social settings that provide distraction:   Name Contact Information   Atul telles) lives with   Dad has #   friend Sunitha has #    Places   my parents house           Step 4: People whom I can ask for help during a crisis:      Name Contact Information    Atul lives with    dad has #    Sunitha has #      Step 5: Professionals or agencies I can contact during a crisis:      Clinican/Agency Name Phone Emergency Contact    Florin Lott LCSW has #     National Suicide Hotline 988     [facility] Crisis [phone]       Local Emergency Department Emergency Department Phone Emergency Department Address    [place] [address]         Crisis Phone Numbers:   Suicide Prevention Lifeline: Call or Text  988 Crisis Text Line: Text HOME to 891-448   Please note: Some [facility] do not have a separate number for Child/Adolescent specific crisis. If your county is not listed under Child/Adolescent, please call the adult number for your county      Adult Crisis Numbers: Child/Adolescent Crisis Numbers   [facility]: [phone] [facility]: [phone]   [facility]: [phone] [facility]: [phone]   [facility]: [phone] [place]: [phone]   [facility]: [phone] [place]: [phone]   [place]: [phone]   [facility]: [phone]   [facility]: [phone]   [facility]: [phone] (daytime) [phone] (after hours, weekends, holidays)       Step 6: Making the environment safer (plan for lethal means safety):   Plan: Amira's 2 hunting rifles and 1 pistol; locked and away - patient confirmed she has no access      Optional: What is most important to me and worth living for?   Atul, family and friends     [facility] Safety Plan. Светлана Oro and Deon Sharp. Used with permission of the authors.

## 2024-04-02 NOTE — PATIENT INSTRUCTIONS
Please take the following medications: Call if you are having any side effects or would like to make any changes to dose or frequency.  Continue Effexor XR 75 mg daily.  Continue Atarax 25 mg up to once daily PRN ONLY AS NEEDED for severe anxiety.  Continue practicing coping strategies, positive affirmations, reframing thoughts, prioritizing sleep hygiene, lifestyle modifications including increasing DAILY exercise, daily morning sunshine, focusing on healthier whole food options and limiting or eliminating packaged items/snacks with added sugars and processed oils, and to maintain adequate hydration, increase mindfulness activities such as journaling, and incorporate more interests and hobbies, walks in nature, breathing techniques and guided meditation, and socialization with friends and family.  Continue scheduled psychotherapy with Florin Lott LCSW as scheduled.  Follow-up with PCP for medication management.    You are encouraged to increase mind-stimulating activities such as reading, crosswords, word searches, puzzles, Soduku, solitaire, coloring, board games involving other, and other brain games.  We also discussed the importance of staying physically active and eating a health diet such as the Mediterranean or MIND diet.       Things that we know are helpful for thinking and memory   Exercise program: gradually increase your physical activity over time. Start small and be patient. Aerobic (cardio) activity is best but incorporate balance, strength and flexibility training as well.   Try to get at least 30min 3 times per week and work up to daily activity (even on rest days with walking, stretching, or yoga).   Diet: Mediterranean diet (colorful fruits and vegetables, olive oil, fish, whole grains, very little red meet is any), MIND diet, anti-inflammatory diet.   Stay well hydrated: drink 8 glasses of water per day.  What's good for your heart is good for your brain.  Avoid high-salt foods.  Sleep: aim for  at least 7-8 hours per night.  Avoid alcohol and medications like Benadryl (Tylenol PM) or other sedating drugs and rather work on sleep hygiene.  Stress management/mindfulness practice:   Talk with our social workers about finding a cognitive behavioral therapists.  Try a smart phone leda like “OnApp” or “mindfulness for beginners.”  Try “curable” for pain.  Take a course in Mindfulness Based Stress Reduction (MBSR).  https://www.Eat Club.com/  Read or listen to an audiobook about it:  Mindfulness for beginners.  10% happier.  The happiness advantage.  Social engagement:   Stay in touch with family and friends.   Plan a few specific activities for your social health every week.  Join a local support group.  Volunteer! [email or web address] or call [phone].     MIND diet score:  1 point for each component.    Green leafy vegetables: at least 6 per week  Other vegetable: at least 1 per day  Berries: at least 2 per week  Red meat: fewer than 4 per week  Fish: at least 1 per week  Poultry: at least 2 per week  Beans: at least 3 per week  Nuts: at least 5 per week  Fast or fried food: less than 1 per week  Olive oil for cooking, avoid processed vegetable oils  Butter less: less than 1 table-spoon per day  Cheese: less than 1 serving per week  Pastries/sweets: less than 5 servings per week  Alcohol: no more than 1 serving/ day     Tips for Good Sleep     Start with basic sleep hygiene:  1. Life can be hectic, but do your best to honor the fundamental rhythmic infrastructure of life:    Regular bed and rising time, obtaining exposure to early morning light and evening dim light    Regular times for meals and exercise.    Don't nap. Although napping is known to offer a number of medical and psychological benefits, it is contraindicated with insomnia.     2.  Reduce 'body noise':    Manage caffeine, nicotine, alcohol and other drugs. Alcohol should never be taken as a sleep aid because it suppresses deep &  "REM (dreaming) sleep which are necessary for health.    Given its substantial half-life, standard cautions about caffeine may not be sufficiently conservative for many. Avoid all caffeine and other stimulant use after noon.      3. Reduce mind noise:     Anxiety and depression, high stress and unresolved emotions all are common causes of disturbed sleep (insomnia). Talk to your doctor about further treatment of these, including seeing a therapist to assist resolving unprocessed emotions.     Use the Bedroom (or at least the bed) for sleep & sex only. Don't have a TV in the bedroom, and don't work in the bedroom     Go to bed only when sleepy.         Develop a consistent bedtime ritual to facilitate letting go of wakefulness, e.g. warm bath, journaling (excellent to process emotions), restful practices that induce relaxation response such as relaxation exercises. Headspace Premium version has a 30 part healthy sleep module set and a nice sleep session.      Turn lights down low an hour prior to bedtime. If bright lights are unavoidable in the evening, use brent/orange glasses to block melatonin-suppressing blue light for the 1 - 2 hrs. prior to bedtime.      Keep evening entertainment light. Don't watch the evening news if upsetting. Light comedy is ok. Avoid 'post-dramatic' stress disorder such as watching scary movies and news     Emphasize the key process of letting go or surrender in sleep onset. In the end, we cannot finagle sleep. We can set the stage and be receptive to it, but we cannot intentionally \"go to sleep.\" Efforts to do so typically backfire   .    4. Reduce bedroom noise:    Healthy sleep environment: Bedroom cool (~ 68 F), completely dark, quiet,   psychologically safe and as environmentally 'green' as is feasible. Dusk simulation by dimming lights 1 - 3 hrs. prior to bedtime.      HEPA filtration & houseplants, organic and non-   toxic bedding.    Avoid clock watching - position the clock away " from the bed       3. Regular exercise is critical and encourages deep sleep    However, avoid aerobics at least 3 - 4 hours prior to bed because it raises core temperature, which can interfere with sleep.     5. Nutrition:    Avoid high glycemic (like processed bakery goods & snacks) and harder to digest foods as bedtime snacks. Avoid GERD (heartburn)  aggravating foods. As an alternative, consider complex carbohydrates that may help transport tryptophan, a precursor to melatonin, across the blood-brain barrier.   Calcium, Mg, B-complex support sleep    Other sleep aids:   Sleep masks can help to keep dark. Also comforting   Ear plugs   White noise: fans and humidifiers,   'Sleep Bug' and other apps   Brain wave entrainment      6. Supplements that support sleep     Botanicals alone or in combination like valerian, lemon balm, lavender, chamomile (caution, some diuretic effect), and/or hops. A good brand is 'Phytocalm' by [employer]   Valerian ( Valeriana officinalis ) root  Requires 2 - 3 weeks to work. Dose: 1-3 grams crude root or 800-1200 mg of an extract standardized to 0.8-1.0% valerenic acid taken 30-60 minutes   prior to bedtime   More for chronic insomnia. Can be used as part of wean off benzos. .    Hops ( Humulus lupulus) Commission E approved for anxiety and sleep disturbance Dose is 300-500 mg before bed either in capsule or tincture.       Melatonin is useful in aging populations and/or with circadian   irregularities. Always couple supplementation with other sleep hygiene recommendations. Dose: Adults: 0.25-0.5 mg SL sustained-release: ( not the common 3 mg tab). SL bypasses first pass liver metabolism & has more reliable levels. Take at ~ 8PM rather than at bedtime   ~ 11 PM. Not a sleeping pill. Rather a gentle invitation to sleep, but will   not override a lot of noise. As melatonin levels rise, body temp.   lowers. Adverse effects rare & usually related to v. high doses. May    increase dreaming. Melatonin works best in jet lag, shift workers, visually blind to re-set circadian   rhythm. For jetlag, works best if travel eastward. Take it a couple of   hours prior to when would have gone to sleep at one's origin.     5-Hydroxytryptophan (5-HTP) Dose: for depression and anxiety is  mg, one to three times per day. For sleep,  mg, 30-60 min prior to bedtime. Adverse effects: > 300 mg per day may ocasionaly cause GI upset such as nausea. In rare cases, 5HTP supplements have been associated with a serious medical condition called eosinophilia-myalgia syndrome (EMS).   People with EMS have severe muscle and joint pains, and some may have other symptoms such as a rash, skin thickening, trouble breathing, swelling, numbness and tingling, trouble concentrating, fever, and fatigue.  It is unclear if the cases of EMS were due to contamination of the supplements or due to a direct cause of the 5HTP itself.  If you choose to use 5HTP, please be aware of the symptoms of EMS and see a doctor if you develop any of the above. Use this supplement only with your doctors explicit guidance.     Counterproductive measures that don't help much in the long run:   Hypnotics don't work, with no improvement on sleep polysomography. They just prevent remembering poor sleep. Include Ambien, Lunesta, Benzodiazepines    OTC sleepers such as Benadryl (diphenhydramine) are problematic - anti-cholinergic side effects like dry mouth, can lead to REM suppression. ½ life ~ 18 hrs. can disturb memory.   Staying longer in bed when didn't sleep well is generally counter-productive     Please call the office nursing staff for medication issues including refills, problems getting medications, bothersome side effects, etc., at [phone].     Please return for a follow up appointment as discussed and arrive approximately 15 minutes prior to your appointment time. If you are running late or are unable to attend your  "appointment, please call our [place] office at [phone] (fax: [phone]).    Look up \"grounding techniques\" and/or \"anchoring demonstration\" online and try a few to see what may work for you. Practice these skills before you need them, when you are not feeling too anxious or triggered. You can also search for free guided meditation videos online to help improve your head space when you are feeling very anxious or triggered.    Recommendations regarding insomnia:  Wake-up at the same time every day  Refrain from \"napping\".  Refrain from going to bed unless you're tired  Utilize your bedroom for sleep only.  Avoid use of electronics including television and/or cellphone/computers.  Refrain from use of electronics including television and/or cellphones/computers prior to bed  Turn your alarm clock away so the light is not visible.  Attempt relaxation using various means like reading if you're restless in bed for approximately 15-20 minutes.  Participate in regular physical activities like exercise, although avoid approximately 3-4 hours prior to bed.  Morning exercise is ideal.  Avoid caffeine use prior to bedtime.  Consider tapering down excessive use of caffeine.  Avoid tobacco use prior to bedtime.  Avoid alcohol use prior to bedtime.  Consider reading \"No More Sleepless nights\" by Aly Art, Ph.D.  Consider use of online resources including:  http://NetPlenish/cbt-online-insomnia-treatment.html  http://www.Typerings.com  CBT-I  Linda on your Smart Phone.  Go! To Sleep by the [facility].    Healthy Diet   The American Heart Association and the American College of Cardiology have long recommended a healthy diet for not only patients who are at risk for atherosclerotic cardiovascular disease (ASCVD) but also the general public. In keeping with this evidence-based recommendation, the \"2018 Guideline on the Management of Blood Cholesterol\" stresses that a healthy diet should include adequate intake " of these essentials:   Vegetables, fruits, and whole grains   Legumes and nuts   Low-fat dairy products   Low-fat poultry (without the skin)   Fish and seafood   Nontropical vegetable oils     The recent guidelines do provide room for cultural food preferences in a healthy diet, but in general, all patients should limit their intake of saturated and avoid all trans fats, sweets, sugar-sweetened beverages, and red meats.  Please maintain adequate hydration of at least 2 Liters of water per day, and improve nutrition by decreasing portion sizes, avoiding fried foods, fast foods, inappropriate snacking and overly processed and packaged items with 'added sugars' (whether in drinks or foods), and observing nutritional facts information on any items that are packaged to reduce intake of saturated fats and AVOID trans fats as mentioned above. Again, consume whole foods such as vegetables, higher lean protein intake, and using healthier lifestyle plans such as the Mediterranean diet with healthier fats such as those from seeds, nuts, and using organic extra virgin olive oil or avocado oil in lieu of processed vegetable oils or margarine.    Physical Activity   Recommended DAILY exercise for at least 150 minutes of moderate exercise weekly!  In addition to a healthy diet, all patients should include regular physical activity in their weekly routines, at moderate to vigorous intensity. Any activity is better than nothing, so if your patients can't meet the recommendation of vigorous activity, moderate-intensity activity can still help them reduce their risk of ASCVD.     Below are the American Heart Association's recommendations for physical activity per week (preferably spread throughout the week):     For Overall Cardiovascular Health and Lowering Cholesterol   At least 150 minutes of moderate-intensity physical activity (for example, 30 minutes, 5 days a week), or   At least 75 minutes of vigorous-intensity physical  activity (for example, 25 minutes, 3 days a week); or   A combination of moderate- and vigorous-intensity aerobic activity, and   At least 2 days of moderate- to high-intensity muscle-strengthening activities (such as resistance weight training) for additional health benefits    Weight Control   It's important to work with patients to help them reach and maintain a healthy weight (Table 3). You may need to suggest that they adjust their caloric intake to avoid weight gain or, in overweight and obese patients, to promote weight loss.     Table 3. Body Mass Index           If you have thoughts of harming yourself or are otherwise in psychological crisis, do not hesitate to contact your [facility] Crisis hotline, or 911 or go to the nearest emergency room.  Adult Crisis Numbers  Suicide Prevention Hotline - Dial 9-8-8  [facility]: [phone] or [phone]  [facility]: [phone]  [facility]: [phone]  [facility]: [phone]  [facility]: [phone] or [phone]  [facility]: [phone]  [facility]: [phone] or [facility] Crisis: [phone]  [facility]: [phone] (daytime).       [phone] (after hours, weekends, holidays)     Child/Adolescent Crisis Numbers   [facility]: [phone]   [facility]: [phone]   [place]: [phone]   [place]: [phone]  Please note: Some [facility] do not have a separate number for Child/Adolescent specific crisis. If your county is not listed under Child/Adolescent, please call the adult number for your county     National Talk to Text Line   [employer] - [phone]    National Suicide Prevention Hotline: 1-364.961.8025 or call 769.

## 2024-05-08 ENCOUNTER — ANNUAL EXAM (OUTPATIENT)
Dept: OBGYN CLINIC | Facility: CLINIC | Age: 24
End: 2024-05-08
Payer: COMMERCIAL

## 2024-05-08 VITALS — HEIGHT: 63 IN | WEIGHT: 139 LBS | BODY MASS INDEX: 24.63 KG/M2

## 2024-05-08 DIAGNOSIS — Z01.419 ENCOUNTER FOR GYNECOLOGICAL EXAMINATION WITHOUT ABNORMAL FINDING: Primary | ICD-10-CM

## 2024-05-08 DIAGNOSIS — Z11.3 SCREEN FOR STD (SEXUALLY TRANSMITTED DISEASE): ICD-10-CM

## 2024-05-08 DIAGNOSIS — Z30.431 SURVEILLANCE OF INTRAUTERINE CONTRACEPTION: ICD-10-CM

## 2024-05-08 PROCEDURE — S0612 ANNUAL GYNECOLOGICAL EXAMINA: HCPCS | Performed by: NURSE PRACTITIONER

## 2024-05-08 PROCEDURE — 87591 N.GONORRHOEAE DNA AMP PROB: CPT | Performed by: NURSE PRACTITIONER

## 2024-05-08 PROCEDURE — 87491 CHLMYD TRACH DNA AMP PROBE: CPT | Performed by: NURSE PRACTITIONER

## 2024-05-08 NOTE — PROGRESS NOTES
Diagnoses and all orders for this visit:    Encounter for gynecological examination without abnormal finding    Surveillance of intrauterine contraception    Screen for STD (sexually transmitted disease)  -     Chlamydia/GC amplified DNA by PCR      Calcium/vit d inclusion in the diet discussed, call with any issues, SBE reinforced, all concerns addressed.         Pleasant 23 y.o. premenopausal female here for annual exam. She denies any issues with bleeding or her menses. She had a Mirena placed 6/2022 and has mostly amenorrhea with it. First pap was normal on 05/03/2023. Denies vaginal issues today but does have off and on discharge, nothing bothersome. Denies pelvic pain/dyspareunia. Denies any issues with her BCM. Sexually active without any concerns. Agrees to an STD recheck, GC/CT neg 2022, same partner x 7 yrs. Received Gardasils x 3 in 2020.     Past Medical History:   Diagnosis Date    Acne     Anxiety     Chronic vaginitis 03/15/2017    Concussion     Last Assessed: 11/15/2017    Depression      Past Surgical History:   Procedure Laterality Date    APPENDECTOMY      INSERTION OF INTRAUTERINE DEVICE (IUD) N/A      Family History   Problem Relation Age of Onset    Restless legs syndrome Mother     Depression Mother     Depression Father     Colon cancer Paternal Grandmother     Uterine cancer Paternal Grandmother     OCD Sister     Anxiety disorder Brother     Hashimoto's thyroiditis Maternal Grandmother     Breast cancer Neg Hx     Ovarian cancer Neg Hx     Cervical cancer Neg Hx      Social History     Tobacco Use    Smoking status: Never    Smokeless tobacco: Never    Tobacco comments:     Vape - ceased use    Vaping Use    Vaping status: Former    Substances: Nicotine, Flavoring   Substance Use Topics    Alcohol use: Yes     Comment: socially    Drug use: No       Current Outpatient Medications:     albuterol (PROVENTIL HFA,VENTOLIN HFA) 90 mcg/act inhaler, Inhale 1-2 puffs, Disp: , Rfl: 0     "dicyclomine (BENTYL) 20 mg tablet, Take 1 tablet (20 mg total) by mouth every 6 (six) hours, Disp: 60 tablet, Rfl: 0    hydrOXYzine HCL (ATARAX) 25 mg tablet, Take 1 tablet (25 mg total) by mouth daily as needed (moderate-severe anxiety), Disp: 30 tablet, Rfl: 1    Levonorgestrel (MIRENA) 20 MCG/DAY IUD, 1 each by Intrauterine route once, Disp: , Rfl:     ondansetron (ZOFRAN-ODT) 4 mg disintegrating tablet, Take 1 tablet (4 mg total) by mouth every 6 (six) hours as needed for nausea or vomiting, Disp: 30 tablet, Rfl: 1    spironolactone (ALDACTONE) 50 mg tablet, Take 2 pills (100 mg) daily. Take with large glass of water. STOP IMMEDIATELY IF YOU BECOME UNINTENTIONALLY PREGNANT or if you decide to plan for pregnancy. Avoid large amounts of high potassium food or beverages while taking this medication., Disp: 180 tablet, Rfl: 3    venlafaxine (EFFEXOR-XR) 75 mg 24 hr capsule, Take 1 capsule (75 mg total) by mouth daily, Disp: 90 capsule, Rfl: 1    Dapsone 7.5 % GEL, Apply 1 Application topically in the morning, Disp: 60 g, Rfl: 2  Patient Active Problem List    Diagnosis Date Noted    Vitamin D deficiency 2023    Tachycardia 2022    Chronic pain of both knees 2020    Canker sores oral 2020    Generalized anxiety disorder 2018    Current moderate episode of major depressive disorder without prior episode (HCC) 2018    Closed fracture of left side of occipital bone (HCC) 2017    Acne 2016       No Known Allergies    OB History    Para Term  AB Living   0 0 0 0 0 0   SAB IAB Ectopic Multiple Live Births   0 0 0 0 0     [employer], 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    Review of Systems   Constitutional: Negative for chills, fatigue, fever and unexpected weight change.   Respiratory: Negative for shortness of breath.    Gastrointestinal: " Negative for anal bleeding, blood in stool, +IBS constipation and diarrhea, sees GI.   Genitourinary: Negative for difficulty urinating, dysuria and hematuria.     Physical Exam   Constitutional: She appears well-developed and well-nourished. No distress.   HENT: atraumatic, EOMI  Head: Normocephalic.   Neck: Normal range of motion. Neck supple.   Pulmonary: Effort normal.  Breasts: bilateral without masses, skin changes or nipple discharge. Bilaterally soft and warm to touch. No areas of erythema or pain.  Abdominal: Soft.   Pelvic exam was performed with patient supine. No labial fusion. There is no rash, tenderness, lesion or injury on the right labia. There is no rash, tenderness, lesion or injury on the left labia. Urethral meatus does not show any tenderness, inflammation or discharge. Palpation of midline bladder without pain or discomfort.Uterus is not deviated, not enlarged, not fixed and not tender. Cervix exhibits no motion tenderness, no discharge and no friability. IUD strings seen from OS  Right adnexum displays no mass, no tenderness and no fullness. Left adnexum displays no mass, no tenderness and no fullness. No erythema or tenderness in the vagina. No foreign body in the vagina. No signs of injury around the vagina. NO vaginal discharge found. No signs of injury around the vagina or anus. Perineum without lesions, signs of injury, erythema or swelling.  Lymphadenopathy:        Right: No inguinal adenopathy present.        Left: No inguinal adenopathy present.

## 2024-05-10 LAB
C TRACH DNA SPEC QL NAA+PROBE: NEGATIVE
N GONORRHOEA DNA SPEC QL NAA+PROBE: NEGATIVE

## 2024-05-14 ENCOUNTER — APPOINTMENT (OUTPATIENT)
Age: 24
End: 2024-05-14
Payer: COMMERCIAL

## 2024-05-14 DIAGNOSIS — Z13.6 SCREENING FOR CARDIOVASCULAR CONDITION: ICD-10-CM

## 2024-05-14 DIAGNOSIS — E55.9 VITAMIN D DEFICIENCY: ICD-10-CM

## 2024-05-14 LAB
25(OH)D3 SERPL-MCNC: 23.4 NG/ML (ref 30–100)
ALBUMIN SERPL BCP-MCNC: 4.9 G/DL (ref 3.5–5)
ALP SERPL-CCNC: 76 U/L (ref 34–104)
ALT SERPL W P-5'-P-CCNC: 11 U/L (ref 7–52)
ANION GAP SERPL CALCULATED.3IONS-SCNC: 9 MMOL/L (ref 4–13)
AST SERPL W P-5'-P-CCNC: 17 U/L (ref 13–39)
BILIRUB SERPL-MCNC: 0.45 MG/DL (ref 0.2–1)
BUN SERPL-MCNC: 10 MG/DL (ref 5–25)
CALCIUM SERPL-MCNC: 9.3 MG/DL (ref 8.4–10.2)
CHLORIDE SERPL-SCNC: 104 MMOL/L (ref 96–108)
CHOLEST SERPL-MCNC: 181 MG/DL
CO2 SERPL-SCNC: 27 MMOL/L (ref 21–32)
CREAT SERPL-MCNC: 0.83 MG/DL (ref 0.6–1.3)
EST. AVERAGE GLUCOSE BLD GHB EST-MCNC: 97 MG/DL
GFR SERPL CREATININE-BSD FRML MDRD: 100 ML/MIN/1.73SQ M
GLUCOSE P FAST SERPL-MCNC: 91 MG/DL (ref 65–99)
HBA1C MFR BLD: 5 %
HDLC SERPL-MCNC: 68 MG/DL
LDLC SERPL CALC-MCNC: 91 MG/DL (ref 0–100)
NONHDLC SERPL-MCNC: 113 MG/DL
POTASSIUM SERPL-SCNC: 4.1 MMOL/L (ref 3.5–5.3)
PROT SERPL-MCNC: 7.3 G/DL (ref 6.4–8.4)
SODIUM SERPL-SCNC: 140 MMOL/L (ref 135–147)
TRIGL SERPL-MCNC: 110 MG/DL
TSH SERPL DL<=0.05 MIU/L-ACNC: 2.63 UIU/ML (ref 0.45–4.5)

## 2024-05-14 PROCEDURE — 82306 VITAMIN D 25 HYDROXY: CPT

## 2024-05-14 PROCEDURE — 83036 HEMOGLOBIN GLYCOSYLATED A1C: CPT

## 2024-05-14 PROCEDURE — 80053 COMPREHEN METABOLIC PANEL: CPT

## 2024-05-14 PROCEDURE — 80061 LIPID PANEL: CPT

## 2024-05-14 PROCEDURE — 84443 ASSAY THYROID STIM HORMONE: CPT

## 2024-05-14 PROCEDURE — 36415 COLL VENOUS BLD VENIPUNCTURE: CPT

## 2024-05-15 DIAGNOSIS — E55.9 VITAMIN D DEFICIENCY: Primary | ICD-10-CM

## 2024-05-15 RX ORDER — ERGOCALCIFEROL 1.25 MG/1
50000 CAPSULE ORAL WEEKLY
Qty: 16 CAPSULE | Refills: 1 | Status: SHIPPED | OUTPATIENT
Start: 2024-05-15

## 2024-06-13 ENCOUNTER — DOCUMENTATION (OUTPATIENT)
Dept: PSYCHIATRY | Facility: CLINIC | Age: 24
End: 2024-06-13

## 2024-06-13 DIAGNOSIS — F41.1 GENERALIZED ANXIETY DISORDER: Primary | ICD-10-CM

## 2024-06-13 DIAGNOSIS — F32.4 MAJOR DEPRESSIVE DISORDER WITH SINGLE EPISODE, IN PARTIAL REMISSION (HCC): ICD-10-CM
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
## 2024-06-18 ENCOUNTER — TELEPHONE (OUTPATIENT)
Dept: PSYCHIATRY | Facility: CLINIC | Age: 24
End: 2024-06-18

## 2024-06-18 NOTE — TELEPHONE ENCOUNTER
DISCHARGE LETTER for Yael (certified and regular) placed in outgoing mail on 06/18/24.    Article #:  [id number]    Address:  [address] [facility]  [place] [facility] [address]

## 2024-08-06 NOTE — PSYCH
TREATMENT PLAN (Medication Management Only)        [facility]    Name and Date of Birth:  Baron John 17 y o  2000  Date of Treatment Plan: August 16, 2018  Diagnosis/Diagnoses:    1  Generalized anxiety disorder    2  Current moderate episode of major depressive disorder without prior episode [facility])      Strengths/Personal Resources for Self-Care: "Smart, hard-working, good friend, good at communicating"  Area/Areas of need (in own words): "Self-esteem, feeling more positive about self"  1  Long Term Goal: Work on improving self-esteem, finishing summer work      Target Date: 1 year - 8/16/2019  Person/Persons responsible for completion of goal: BALDEMAR Moncada   2   Short Term Objective (s) - How will we reach this goal?:   A  Provider new recommended medication/dosage changes and/or continue medication(s): continue current medications as prescribed  B   Not being so hard on self, trying to have more positive thoughts     C  Trying to get involved in activities that patient excels in     Target Date: 3 months - 11/16/2018  Person/Persons Responsible for Completion of Goal: BALDEMAR Moncada  Progress Towards Goals: continuing treatment  Treatment Modality: medication management every 3 months  Review due 90 to 120 days from date of this plan: 3 months - 11/16/2018  Expected length of service: maintenance  My Physician/PA/NP and I have developed this plan together and I agree to work on the goals and objectives  I understand the treatment goals that were developed for my treatment    Signature:       Date and time:  Signature of parent/guardian if under age of 15 years: Date and time:  Signature of provider:      Date and time:  Signature of Supervising Physician:    Date and time: 8/16/2018      Orvan Carrel, MD [[email or web address]] : 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  Imaging was negative for metastatic disease. She is now s/p large bowel rectosigmoid excision 10/12/23 w/ Dr. Gutierrez which revealed Infiltrating moderately differentiated adenocarcinoma measuring 3.0 cm, infiltrates into the muscularis propria, 8/26 lymph nodes are positive for metastatic carcinoma.  LVI neg.  pT2N2b, prognostic stage IIIB Given multiple positive lymph nodes and stage III disease would recommend adjuvant chemotherapy with FOLFOX x6 months. In patients w/ high risk stage III colorectal cancer, 3 months of FOLFOX was inferior to 6 months of FOLFOX.   Received D1C1 FOLFOX 11/7/23 w/ good tolerance.  Completed 12 cycles of FOLFOX on 4/30/24 which she overall tolerated well.  Following w/ Dr. De León- CT A/P - 5/15/24 - no intra-abdominal metastasis, 2 cm left ovarian dermoid cyst (stable) Now s/p ileostomy reversal 6/7/24 and healing very well We discussed r/b/a to DrFirst MRD testing (ctDNA) - pt interested in proceeding - will send initial blood sample today, consider repeat q 3-6 months.

## 2024-10-11 DIAGNOSIS — F41.1 GENERALIZED ANXIETY DISORDER: ICD-10-CM

## 2024-10-11 RX ORDER — HYDROXYZINE HYDROCHLORIDE 25 MG/1
25 TABLET, FILM COATED ORAL DAILY PRN
Qty: 30 TABLET | Refills: 1 | Status: SHIPPED | OUTPATIENT
Start: 2024-10-11 | End: 2024-12-10

## 2024-11-12 DIAGNOSIS — F41.1 GENERALIZED ANXIETY DISORDER: ICD-10-CM

## 2024-11-12 RX ORDER — VENLAFAXINE HYDROCHLORIDE 75 MG/1
75 CAPSULE, EXTENDED RELEASE ORAL DAILY
Qty: 90 CAPSULE | Refills: 1 | Status: SHIPPED | OUTPATIENT
Start: 2024-11-12 | End: 2025-05-11

## 2024-11-19 DIAGNOSIS — L70.0 ACNE VULGARIS: ICD-10-CM

## 2024-11-19 RX ORDER — DAPSONE 75 MG/G
1 GEL TOPICAL DAILY
Qty: 60 G | Refills: 3 | Status: SHIPPED | OUTPATIENT
Start: 2024-11-19 | End: 2024-12-19

## 2024-11-19 RX ORDER — SPIRONOLACTONE 50 MG/1
TABLET, FILM COATED ORAL
Qty: 180 TABLET | Refills: 3 | Status: SHIPPED | OUTPATIENT
Start: 2024-11-19

## 2025-02-12 ENCOUNTER — OFFICE VISIT (OUTPATIENT)
Dept: FAMILY MEDICINE CLINIC | Facility: CLINIC | Age: 25
End: 2025-02-12
Payer: COMMERCIAL

## 2025-02-12 ENCOUNTER — APPOINTMENT (OUTPATIENT)
Dept: LAB | Facility: HOSPITAL | Age: 25
End: 2025-02-12
Payer: COMMERCIAL

## 2025-02-12 VITALS
RESPIRATION RATE: 14 BRPM | TEMPERATURE: 98.1 F | HEART RATE: 108 BPM | BODY MASS INDEX: 24.45 KG/M2 | DIASTOLIC BLOOD PRESSURE: 70 MMHG | OXYGEN SATURATION: 99 % | WEIGHT: 138 LBS | SYSTOLIC BLOOD PRESSURE: 100 MMHG | HEIGHT: 63 IN

## 2025-02-12 DIAGNOSIS — E55.9 VITAMIN D DEFICIENCY: ICD-10-CM

## 2025-02-12 DIAGNOSIS — Z13.6 SCREENING FOR CARDIOVASCULAR CONDITION: ICD-10-CM

## 2025-02-12 DIAGNOSIS — R22.0 LOCALIZED SWELLING, MASS, AND LUMP OF HEAD: Primary | ICD-10-CM

## 2025-02-12 LAB
25(OH)D3 SERPL-MCNC: 33.1 NG/ML (ref 30–100)
ALBUMIN SERPL BCG-MCNC: 5 G/DL (ref 3.5–5)
ALP SERPL-CCNC: 83 U/L (ref 34–104)
ALT SERPL W P-5'-P-CCNC: 12 U/L (ref 7–52)
ANION GAP SERPL CALCULATED.3IONS-SCNC: 5 MMOL/L (ref 4–13)
AST SERPL W P-5'-P-CCNC: 18 U/L (ref 13–39)
BASOPHILS # BLD AUTO: 0.05 THOUSANDS/ΜL (ref 0–0.1)
BASOPHILS NFR BLD AUTO: 1 % (ref 0–1)
BILIRUB SERPL-MCNC: 0.47 MG/DL (ref 0.2–1)
BUN SERPL-MCNC: 11 MG/DL (ref 5–25)
CALCIUM SERPL-MCNC: 9.8 MG/DL (ref 8.4–10.2)
CHLORIDE SERPL-SCNC: 104 MMOL/L (ref 96–108)
CHOLEST SERPL-MCNC: 188 MG/DL (ref ?–200)
CO2 SERPL-SCNC: 29 MMOL/L (ref 21–32)
CREAT SERPL-MCNC: 0.85 MG/DL (ref 0.6–1.3)
EOSINOPHIL # BLD AUTO: 0.15 THOUSAND/ΜL (ref 0–0.61)
EOSINOPHIL NFR BLD AUTO: 3 % (ref 0–6)
ERYTHROCYTE [DISTWIDTH] IN BLOOD BY AUTOMATED COUNT: 11.3 % (ref 11.6–15.1)
EST. AVERAGE GLUCOSE BLD GHB EST-MCNC: 100 MG/DL
GFR SERPL CREATININE-BSD FRML MDRD: 96 ML/MIN/1.73SQ M
GLUCOSE P FAST SERPL-MCNC: 89 MG/DL (ref 65–99)
HBA1C MFR BLD: 5.1 %
HCT VFR BLD AUTO: 44.4 % (ref 34.8–46.1)
HDLC SERPL-MCNC: 69 MG/DL
HGB BLD-MCNC: 14.4 G/DL (ref 11.5–15.4)
IMM GRANULOCYTES # BLD AUTO: 0.01 THOUSAND/UL (ref 0–0.2)
IMM GRANULOCYTES NFR BLD AUTO: 0 % (ref 0–2)
LDLC SERPL CALC-MCNC: 100 MG/DL (ref 0–100)
LYMPHOCYTES # BLD AUTO: 2.36 THOUSANDS/ΜL (ref 0.6–4.47)
LYMPHOCYTES NFR BLD AUTO: 44 % (ref 14–44)
MCH RBC QN AUTO: 29.4 PG (ref 26.8–34.3)
MCHC RBC AUTO-ENTMCNC: 32.4 G/DL (ref 31.4–37.4)
MCV RBC AUTO: 91 FL (ref 82–98)
MONOCYTES # BLD AUTO: 0.4 THOUSAND/ΜL (ref 0.17–1.22)
MONOCYTES NFR BLD AUTO: 8 % (ref 4–12)
NEUTROPHILS # BLD AUTO: 2.39 THOUSANDS/ΜL (ref 1.85–7.62)
NEUTS SEG NFR BLD AUTO: 44 % (ref 43–75)
NONHDLC SERPL-MCNC: 119 MG/DL
NRBC BLD AUTO-RTO: 0 /100 WBCS
PLATELET # BLD AUTO: 315 THOUSANDS/UL (ref 149–390)
PMV BLD AUTO: 8.8 FL (ref 8.9–12.7)
POTASSIUM SERPL-SCNC: 4.4 MMOL/L (ref 3.5–5.3)
PROT SERPL-MCNC: 7.7 G/DL (ref 6.4–8.4)
RBC # BLD AUTO: 4.89 MILLION/UL (ref 3.81–5.12)
SODIUM SERPL-SCNC: 138 MMOL/L (ref 135–147)
TRIGL SERPL-MCNC: 97 MG/DL (ref ?–150)
TSH SERPL DL<=0.05 MIU/L-ACNC: 1.87 UIU/ML (ref 0.45–4.5)
WBC # BLD AUTO: 5.36 THOUSAND/UL (ref 4.31–10.16)

## 2025-02-12 PROCEDURE — 99214 OFFICE O/P EST MOD 30 MIN: CPT

## 2025-02-12 PROCEDURE — 82306 VITAMIN D 25 HYDROXY: CPT

## 2025-02-12 PROCEDURE — 80061 LIPID PANEL: CPT

## 2025-02-12 PROCEDURE — 36415 COLL VENOUS BLD VENIPUNCTURE: CPT

## 2025-02-12 PROCEDURE — 85025 COMPLETE CBC W/AUTO DIFF WBC: CPT

## 2025-02-12 PROCEDURE — 84443 ASSAY THYROID STIM HORMONE: CPT

## 2025-02-12 PROCEDURE — 83036 HEMOGLOBIN GLYCOSYLATED A1C: CPT

## 2025-02-12 PROCEDURE — 80053 COMPREHEN METABOLIC PANEL: CPT

## 2025-02-12 NOTE — PROGRESS NOTES
"Name: Brenda Darden      : 2000      MRN: [id number]  Encounter Provider: MAYRA Simmons  Encounter Date: 2025   Encounter department: [facility] [address]  :  Assessment & Plan  Localized swelling, mass, and lump of head  There is about a 2 cm in diameter fluid-filled mass on the right frontal side of the head, it is tender to touch and elicits tingling and numbness with palpation that can last 20 minutes.  Will start with ultrasound and call with results and instructions.  Orders:  •  US head neck soft tissue; Future    Vitamin D deficiency  Have lab work follow-up in 1 month or so for annual physical.  Orders:  •  Vitamin D 25 hydroxy; Future    Screening for cardiovascular condition  Have lab work follow-up in 1 month or so for annual physical.  Orders:  •  CBC and differential; Future  •  Comprehensive metabolic panel; Future  •  Lipid panel; Future  •  Hemoglobin A1C; Future  •  TSH, 3rd generation with Free T4 reflex; Future           History of Present Illness   Symptoms started in 2024. She feels a lump on her head. The lump is on the right frontal area, she does have associated tingling and numbness \"zaps\" when palpated on the whole right side of her face that last about 20 minutes. Vision is not affected. She did have a temporal fracture on the left around age 16.       Review of Systems   Constitutional:  Negative for appetite change, chills, diaphoresis, fatigue and fever.   HENT:  Positive for facial swelling. Negative for congestion, ear pain, hearing loss, sinus pressure, sinus pain, sore throat, trouble swallowing and voice change.    Eyes:  Negative for discharge and visual disturbance.   Respiratory:  Negative for cough, chest tightness, shortness of breath and wheezing.    Cardiovascular:  Negative for chest pain and palpitations.   Gastrointestinal:  Positive for abdominal pain, constipation, diarrhea and nausea. " "  Musculoskeletal:  Positive for arthralgias, back pain and myalgias.   Neurological:  Positive for numbness and headaches. Negative for dizziness, syncope, speech difficulty and light-headedness.   Psychiatric/Behavioral:  Negative for dysphoric mood and sleep disturbance. The patient is not nervous/anxious.    All other systems reviewed and are negative.      Objective   /70   Pulse (!) 108   Temp 98.1 °F (36.7 °C)   Resp 14   Ht 5' 3\" (1.6 m)   Wt 62.6 kg (138 lb)   SpO2 99%   BMI 24.45 kg/m²      Physical Exam  Vitals and nursing note reviewed.   Constitutional:       General: She is not in acute distress.     Appearance: Normal appearance. She is well-developed. She is ill-appearing.   HENT:      Head: Normocephalic and atraumatic.      Comments: Palpable fluid filled mass on the right frontal area about 2 cm in size     Right Ear: Tympanic membrane, ear canal and external ear normal. There is no impacted cerumen.      Left Ear: Tympanic membrane, ear canal and external ear normal. There is no impacted cerumen.      Nose: Nose normal. No congestion or rhinorrhea.      Mouth/Throat:      Mouth: Mucous membranes are moist.      Pharynx: No posterior oropharyngeal erythema.   Eyes:      Extraocular Movements: Extraocular movements intact.      Conjunctiva/sclera: Conjunctivae normal.      Pupils: Pupils are equal, round, and reactive to light.   Cardiovascular:      Rate and Rhythm: Normal rate and regular rhythm.      Pulses: Normal pulses.      Heart sounds: Normal heart sounds. No murmur heard.  Pulmonary:      Effort: Pulmonary effort is normal. No respiratory distress.      Breath sounds: Normal breath sounds.   Abdominal:      General: Bowel sounds are normal. There is no distension.      Palpations: Abdomen is soft.      Tenderness: There is no abdominal tenderness.   Musculoskeletal:         General: No swelling or tenderness. Normal range of motion.      Cervical back: Normal range of motion " and neck supple. No tenderness.      Right lower leg: No edema.      Left lower leg: No edema.   Lymphadenopathy:      Cervical: No cervical adenopathy.   Skin:     General: Skin is warm and dry.      Capillary Refill: Capillary refill takes less than 2 seconds.   Neurological:      Mental Status: She is alert and oriented to person, place, and time.   Psychiatric:         Mood and Affect: Mood normal.         Behavior: Behavior normal.         Thought Content: Thought content normal.         Judgment: Judgment normal.

## 2025-02-12 NOTE — ASSESSMENT & PLAN NOTE
Have lab work follow-up in 1 month or so for annual physical.  Orders:  •  Vitamin D 25 hydroxy; Future

## 2025-02-13 ENCOUNTER — RESULTS FOLLOW-UP (OUTPATIENT)
Dept: FAMILY MEDICINE CLINIC | Facility: CLINIC | Age: 25
End: 2025-02-13

## 2025-02-17 ENCOUNTER — HOSPITAL ENCOUNTER (OUTPATIENT)
Dept: ULTRASOUND IMAGING | Facility: HOSPITAL | Age: 25
Discharge: HOME | End: 2025-02-17
Payer: COMMERCIAL

## 2025-02-17 DIAGNOSIS — R22.0 LOCALIZED SWELLING, MASS, AND LUMP OF HEAD: ICD-10-CM

## 2025-02-17 PROCEDURE — 76536 US EXAM OF HEAD AND NECK: CPT

## 2025-02-25 DIAGNOSIS — R22.0 LOCALIZED SWELLING, MASS, AND LUMP OF HEAD: Primary | ICD-10-CM

## 2025-03-10 ENCOUNTER — CONSULT (OUTPATIENT)
Dept: SURGERY | Facility: CLINIC | Age: 25
End: 2025-03-10
Payer: COMMERCIAL

## 2025-03-10 VITALS
HEIGHT: 63 IN | TEMPERATURE: 98.1 F | OXYGEN SATURATION: 97 % | HEART RATE: 100 BPM | BODY MASS INDEX: 24.98 KG/M2 | SYSTOLIC BLOOD PRESSURE: 118 MMHG | RESPIRATION RATE: 16 BRPM | WEIGHT: 141 LBS | DIASTOLIC BLOOD PRESSURE: 62 MMHG

## 2025-03-10 DIAGNOSIS — R22.0 LOCALIZED SWELLING, MASS, AND LUMP OF HEAD: ICD-10-CM

## 2025-03-10 PROCEDURE — 99203 OFFICE O/P NEW LOW 30 MIN: CPT | Performed by: SURGERY

## 2025-03-10 NOTE — PROGRESS NOTES
Name: Brenda Darden      : 2000      MRN: [id number]  Encounter Provider: Garcia Hollis MD  Encounter Date: 3/10/2025   Encounter department: [facility] [place]  :  Assessment & Plan  Localized swelling, mass, and lump of head  Localized swelling to the right temporal area, tender to palpation, etiology unknown.  I suspect muscle skeletal in origin rather than mass or cystic lesion.  I will refer the patient to neurology for further evaluation of this area, and she is otherwise discharged from our care.  Orders:    Ambulatory Referral to General Surgery    Ambulatory Referral to Neurology; Future    History of anxiety  History of head trauma, concussion and nondisplaced skull fracture 2015  History of depression    History of Present Illness   HPI  Brenda Darden is a 24 y.o. female who presents to my office for evaluation of soft tissue mass from the right temporal area.  The patient stated that she noted painful swelling on the right temporal area for approximately a year, has not increased in size, is very sensitive to touch, no drainage, redness or any other constitutional symptoms.  She conveys having an accident where she developed nondisplaced skull fracture in , she also had a concussion from it.  She was doing well until a year ago when she noticed this painful swelling on the right temporal area for which she was seen by her primary care physician, ultrasound of the area was performed which showed no abnormality.  The patient was referred to laboratory evaluation.    Review of Systems  The rest of the review of system total of 10 were negative except for the HPI.    Pertinent Medical History          Medical History Reviewed by provider this encounter:     .  Past Medical History   Past Medical History:   Diagnosis Date    Acne     Anxiety     Chronic vaginitis 03/15/2017    Concussion     Last Assessed: 11/15/2017    Depression      Past Surgical History:   Procedure  Laterality Date    APPENDECTOMY      COLONOSCOPY      INSERTION OF INTRAUTERINE DEVICE (IUD) N/A      Family History   Problem Relation Age of Onset    Restless legs syndrome Mother     Depression Mother     Depression Father     Colon cancer Paternal Grandmother     Uterine cancer Paternal Grandmother     OCD Sister     Anxiety disorder Brother     Hashimoto's thyroiditis Maternal Grandmother     Breast cancer Neg Hx     Ovarian cancer Neg Hx     Cervical cancer Neg Hx       reports that she has never smoked. She has never been exposed to tobacco smoke. She has never used smokeless tobacco. She reports current alcohol use. She reports that she does not use drugs.  Current Outpatient Medications   Medication Instructions    albuterol (PROVENTIL HFA,VENTOLIN HFA) 90 mcg/act inhaler 1-2 puffs    Dapsone 7.5 % GEL 1 Application, Apply externally, Daily    dicyclomine (BENTYL) 20 mg, Oral, Every 6 hours    ergocalciferol (VITAMIN D2) 50,000 Units, Oral, Weekly    hydrOXYzine HCL (ATARAX) 25 mg, Oral, Daily PRN    Levonorgestrel (MIRENA) 20 MCG/DAY IUD 1 each, Once    ondansetron (ZOFRAN-ODT) 4 mg, Oral, Every 6 hours PRN    spironolactone (ALDACTONE) 50 mg tablet Take 2 pills (100 mg) daily. Take with large glass of water. STOP IMMEDIATELY IF YOU BECOME UNINTENTIONALLY PREGNANT or if you decide to plan for pregnancy. Avoid large amounts of high potassium food or beverages while taking this medication.    venlafaxine (EFFEXOR-XR) 75 mg, Oral, Daily   No Known Allergies   Current Outpatient Medications on File Prior to Visit   Medication Sig Dispense Refill    albuterol (PROVENTIL HFA,VENTOLIN HFA) 90 mcg/act inhaler Inhale 1-2 puffs  0    dicyclomine (BENTYL) 20 mg tablet Take 1 tablet (20 mg total) by mouth every 6 (six) hours 60 tablet 0    ergocalciferol (VITAMIN D2) 50,000 units Take 1 capsule (50,000 Units total) by mouth once a week 16 capsule 1    Levonorgestrel (MIRENA) 20 MCG/DAY IUD 1 each by Intrauterine route  "once      ondansetron (ZOFRAN-ODT) 4 mg disintegrating tablet Take 1 tablet (4 mg total) by mouth every 6 (six) hours as needed for nausea or vomiting 30 tablet 1    spironolactone (ALDACTONE) 50 mg tablet Take 2 pills (100 mg) daily. Take with large glass of water. STOP IMMEDIATELY IF YOU BECOME UNINTENTIONALLY PREGNANT or if you decide to plan for pregnancy. Avoid large amounts of high potassium food or beverages while taking this medication. 180 tablet 3    venlafaxine (EFFEXOR-XR) 75 mg 24 hr capsule Take 1 capsule (75 mg total) by mouth daily 90 capsule 1    Dapsone 7.5 % GEL Apply 1 Application topically in the morning 60 g 3    hydrOXYzine HCL (ATARAX) 25 mg tablet Take 1 tablet (25 mg total) by mouth daily as needed (moderate-severe anxiety) 30 tablet 1     No current facility-administered medications on file prior to visit.      Social History     Tobacco Use    Smoking status: Never     Passive exposure: Never    Smokeless tobacco: Never    Tobacco comments:     Vape - ceased use    Vaping Use    Vaping status: Former    Substances: Nicotine, Flavoring   Substance and Sexual Activity    Alcohol use: Yes     Comment: socially    Drug use: No    Sexual activity: Yes     Partners: Male     Birth control/protection: OCP        Objective   /62 (BP Location: Left arm, Patient Position: Sitting, Cuff Size: Standard)   Pulse 100   Temp 98.1 °F (36.7 °C)   Resp 16   Ht 5' 3\" (1.6 m)   Wt 64 kg (141 lb)   SpO2 97%   BMI 24.98 kg/m²      Physical Exam  Vitals and nursing note reviewed.   Constitutional:       General: She is not in acute distress.  HENT:      Head:      Comments: There is soft tissue swelling on the right temporal area, she does have prominent on the left side but more pronounced on the right, tender to palpation, it is somewhat tender on the left side as well, the area moves while talking, but appears to have a gap.  No increased temperature or redness in the area, no recent evidence of " trauma.  Cardiovascular:      Rate and Rhythm: Regular rhythm. Tachycardia present.   Pulmonary:      Effort: No respiratory distress.      Breath sounds: Normal breath sounds.   Abdominal:      Palpations: Abdomen is soft. There is no mass.      Tenderness: There is no abdominal tenderness.   Skin:     General: Skin is warm.      Coloration: Skin is not jaundiced.      Findings: No erythema or rash.   Neurological:      Mental Status: She is alert and oriented to person, place, and time.      Cranial Nerves: No cranial nerve deficit.   Psychiatric:         Mood and Affect: Mood normal.         Behavior: Behavior normal.

## 2025-03-17 DIAGNOSIS — F41.1 GENERALIZED ANXIETY DISORDER: ICD-10-CM

## 2025-03-17 RX ORDER — HYDROXYZINE HYDROCHLORIDE 25 MG/1
25 TABLET, FILM COATED ORAL DAILY PRN
Qty: 30 TABLET | Refills: 0 | Status: SHIPPED | OUTPATIENT
Start: 2025-03-17 | End: 2025-05-16

## 2025-03-20 ENCOUNTER — OFFICE VISIT (OUTPATIENT)
Dept: FAMILY MEDICINE CLINIC | Facility: CLINIC | Age: 25
End: 2025-03-20
Payer: COMMERCIAL

## 2025-03-20 VITALS
HEART RATE: 103 BPM | HEIGHT: 63 IN | WEIGHT: 138.2 LBS | SYSTOLIC BLOOD PRESSURE: 110 MMHG | DIASTOLIC BLOOD PRESSURE: 62 MMHG | BODY MASS INDEX: 24.49 KG/M2 | OXYGEN SATURATION: 98 %

## 2025-03-20 DIAGNOSIS — Z00.00 ANNUAL PHYSICAL EXAM: Primary | ICD-10-CM

## 2025-03-20 PROCEDURE — 99395 PREV VISIT EST AGE 18-39: CPT

## 2025-03-20 NOTE — PATIENT INSTRUCTIONS
"Patient Education     Routine physical for adults   The Basics   Written by the doctors and editors at [facility]   What is a physical? -- A physical is a routine visit, or \"check-up,\" with your doctor. You might also hear it called a \"wellness visit\" or \"preventive visit.\"  During each visit, the doctor will:   Ask about your physical and mental health   Ask about your habits, behaviors, and lifestyle   Do an exam   Give you vaccines if needed   Talk to you about any medicines you take   Give advice about your health   Answer your questions  Getting regular check-ups is an important part of taking care of your health. It can help your doctor find and treat any problems you have. But it's also important for preventing health problems.  A routine physical is different from a \"sick visit.\" A sick visit is when you see a doctor because of a health concern or problem. Since physicals are scheduled ahead of time, you can think about what you want to ask the doctor.  How often should I get a physical? -- It depends on your age and health. In general, for people age 21 years and older:   If you are younger than 50 years, you might be able to get a physical every 3 years.   If you are 50 years or older, your doctor might recommend a physical every year.  If you have an ongoing health condition, like diabetes or high blood pressure, your doctor will probably want to see you more often.  What happens during a physical? -- In general, each visit will include:   Physical exam - The doctor or nurse will check your height, weight, heart rate, and blood pressure. They will also look at your eyes and ears. They will ask about how you are feeling and whether you have any symptoms that bother you.   Medicines - It's a good idea to bring a list of all the medicines you take to each doctor visit. Your doctor will talk to you about your medicines and answer any questions. Tell them if you are having any side effects that bother you. You " "should also tell them if you are having trouble paying for any of your medicines.   Habits and behaviors - This includes:   Your diet   Your exercise habits   Whether you smoke, drink alcohol, or use drugs   Whether you are sexually active   Whether you feel safe at home  Your doctor will talk to you about things you can do to improve your health and lower your risk of health problems. They will also offer help and support. For example, if you want to quit smoking, they can give you advice and might prescribe medicines. If you want to improve your diet or get more physical activity, they can help you with this, too.   Lab tests, if needed - The tests you get will depend on your age and situation. For example, your doctor might want to check your:   Cholesterol   Blood sugar   Iron level   Vaccines - The recommended vaccines will depend on your age, health, and what vaccines you already had. Vaccines are very important because they can prevent certain serious or deadly infections.   Discussion of screening - \"Screening\" means checking for diseases or other health problems before they cause symptoms. Your doctor can recommend screening based on your age, risk, and preferences. This might include tests to check for:   Cancer, such as breast, prostate, cervical, ovarian, colorectal, prostate, lung, or skin cancer   Sexually transmitted infections, such as chlamydia and gonorrhea   Mental health conditions like depression and anxiety  Your doctor will talk to you about the different types of screening tests. They can help you decide which screenings to have. They can also explain what the results might mean.   Answering questions - The physical is a good time to ask the doctor or nurse questions about your health. If needed, they can refer you to other doctors or specialists, too.  Adults older than 65 years often need other care, too. As you get older, your doctor will talk to you about:   How to prevent falling at " home   Hearing or vision tests   Memory testing   How to take your medicines safely   Making sure that you have the help and support you need at home  All topics are updated as new evidence becomes available and our peer review process is complete.  This topic retrieved from [employer] on: May 02, 2024.  Topic 276774 Version 1.0  Release: 32.4.3 - C32.122  © 2024 UpToDate, Inc[employer] and/or its affiliates. All rights reserved.  Consumer Information Use and Disclaimer   Disclaimer: This generalized information is a limited summary of diagnosis, treatment, and/or medication information. It is not meant to be comprehensive and should be used as a tool to help the user understand and/or assess potential diagnostic and treatment options. It does NOT include all information about conditions, treatments, medications, side effects, or risks that may apply to a specific patient. It is not intended to be medical advice or a substitute for the medical advice, diagnosis, or treatment of a health care provider based on the health care provider's examination and assessment of a patient's specific and unique circumstances. Patients must speak with a health care provider for complete information about their health, medical questions, and treatment options, including any risks or benefits regarding use of medications. This information does not endorse any treatments or medications as safe, effective, or approved for treating a specific patient. Up[employer], Inc[employer] and its affiliates disclaim any warranty or liability relating to this information or the use thereof.The use of this information is governed by the Terms of Use, available at https://www.woltersCellwitchuwer.com/en/know/clinical-effectiveness-terms. 2024© Up[employer]Date, Inc. and its affiliates and/or licensors. All rights reserved.  Copyright   © 2024 [employer], [employer] and/or its affiliates. All rights reserved.

## 2025-03-20 NOTE — PROGRESS NOTES
Adult Annual Physical  Name: Brenda Darden      : 2000      MRN: [id number]  Encounter Provider: MAYRA Simmons  Encounter Date: 3/20/2025   Encounter department: [facility] [address]    Assessment & Plan  Annual physical exam  Reviewed lab work, recommend my plate diet and 2 and half hours of exercise weekly.  Did recommend iron supplement at least every other day.         Preventive Screenings:  - Diabetes Screening: screening up-to-date  - Cholesterol Screening: screening up-to-date   - Chlamydia Screening: screening up-to-date   - Hepatitis C screening: screening up-to-date   - HIV screening: screening up-to-date   - Cervical cancer screening: screening up-to-date   - Lung cancer screening: screening not indicated   - Prostate cancer screening: screening not indicated     Immunizations:  - Immunizations due: Tdap and HPV (Gardasil 9)    Counseling/Anticipatory Guidance:  - Alcohol: discussed moderation in alcohol intake and recommendations for healthy alcohol use.   - Drug use: discussed harms of illicit drug use and how it can negatively impact mental/physical health.   - Tobacco use: discussed harms of tobacco use and management options for quitting.   - Dental health: discussed importance of regular tooth brushing, flossing, and dental visits.   - Sexual health: discussed sexually transmitted diseases, partner selection, use of condoms, avoidance of unintended pregnancy, and contraceptive alternatives.   - Diet: discussed recommendations for a healthy/well-balanced diet.   - Exercise: the importance of regular exercise/physical activity was discussed. Recommend exercise 3-5 times per week for at least 30 minutes.   - Injury prevention: discussed safety/seat belts, safety helmets, smoke detectors, carbon monoxide detectors, and smoking near bedding or upholstery.          History of Present Illness     Adult Annual Physical:  Patient presents for annual  physical. Brenda is here for physical and follow up on her head mass. .     Diet and Physical Activity:  - Diet/Nutrition: no special diet.  - Exercise: no formal exercise.    General Health:  - Sleep: > 8 hours of sleep on average.  - Hearing: normal hearing right ear and normal hearing left ear.  - Vision: wears glasses and most recent eye exam < 1 year ago.  - Dental: regular dental visits and brushes teeth twice daily.    /GYN Health:  - Follows with GYN: yes.   - Menopause: premenopausal.   - History of STDs: no  - Contraception: IUD placement.      Advanced Care Planning:  - Has an advanced directive?: no    - Has a durable medical POA?: no    - ACP document given to patient?: no      Review of Systems   Constitutional:  Negative for chills, diaphoresis and fever.   HENT:  Negative for congestion, ear pain, rhinorrhea, sinus pain and sore throat.    Eyes:  Negative for visual disturbance.   Respiratory:  Negative for cough, chest tightness, shortness of breath and wheezing.    Cardiovascular:  Negative for chest pain and palpitations.   Gastrointestinal:  Positive for constipation and diarrhea. Negative for abdominal pain, nausea and vomiting.   Genitourinary:  Negative for dysuria, frequency, hematuria and urgency.   Musculoskeletal:  Negative for arthralgias, back pain and myalgias.   Skin:  Negative for color change and rash.   Neurological:  Negative for dizziness, seizures, syncope and headaches.   Psychiatric/Behavioral:  Negative for dysphoric mood and sleep disturbance. The patient is not nervous/anxious.    All other systems reviewed and are negative.    Pertinent Medical History           Medical History Reviewed by provider this encounter:  Tobacco  Allergies  Meds  Problems  Med Hx  Surg Hx  Fam Hx     .  Past Medical History   Past Medical History:   Diagnosis Date   • Acne    • Anxiety    • Chronic vaginitis 03/15/2017   • Concussion     Last Assessed: 11/15/2017   • Depression      Past  Surgical History:   Procedure Laterality Date   • APPENDECTOMY     • COLONOSCOPY     • INSERTION OF INTRAUTERINE DEVICE (IUD) N/A      Family History   Problem Relation Age of Onset   • Restless legs syndrome Mother    • Depression Mother    • Depression Father    • Colon cancer Paternal Grandmother    • Uterine cancer Paternal Grandmother    • OCD Sister    • Anxiety disorder Brother    • Hashimoto's thyroiditis Maternal Grandmother    • Breast cancer Neg Hx    • Ovarian cancer Neg Hx    • Cervical cancer Neg Hx       reports that she has never smoked. She has never been exposed to tobacco smoke. She has never used smokeless tobacco. She reports current alcohol use. She reports that she does not use drugs.  Current Outpatient Medications   Medication Instructions   • albuterol (PROVENTIL HFA,VENTOLIN HFA) 90 mcg/act inhaler 1-2 puffs   • Dapsone 7.5 % GEL 1 Application, Apply externally, Daily   • dicyclomine (BENTYL) 20 mg, Oral, Every 6 hours   • ergocalciferol (VITAMIN D2) 50,000 Units, Oral, Weekly   • hydrOXYzine HCL (ATARAX) 25 mg, Oral, Daily PRN   • Levonorgestrel (MIRENA) 20 MCG/DAY IUD 1 each, Once   • ondansetron (ZOFRAN-ODT) 4 mg, Oral, Every 6 hours PRN   • spironolactone (ALDACTONE) 50 mg tablet Take 2 pills (100 mg) daily. Take with large glass of water. STOP IMMEDIATELY IF YOU BECOME UNINTENTIONALLY PREGNANT or if you decide to plan for pregnancy. Avoid large amounts of high potassium food or beverages while taking this medication.   • venlafaxine (EFFEXOR-XR) 75 mg, Oral, Daily   No Known Allergies   Current Outpatient Medications on File Prior to Visit   Medication Sig Dispense Refill   • albuterol (PROVENTIL HFA,VENTOLIN HFA) 90 mcg/act inhaler Inhale 1-2 puffs  0   • Dapsone 7.5 % GEL Apply 1 Application topically in the morning 60 g 3   • dicyclomine (BENTYL) 20 mg tablet Take 1 tablet (20 mg total) by mouth every 6 (six) hours 60 tablet 0   • ergocalciferol (VITAMIN D2) 50,000 units Take 1  "capsule (50,000 Units total) by mouth once a week 16 capsule 1   • hydrOXYzine HCL (ATARAX) 25 mg tablet Take 1 tablet (25 mg total) by mouth daily as needed (moderate-severe anxiety) 30 tablet 0   • Levonorgestrel (MIRENA) 20 MCG/DAY IUD 1 each by Intrauterine route once     • ondansetron (ZOFRAN-ODT) 4 mg disintegrating tablet Take 1 tablet (4 mg total) by mouth every 6 (six) hours as needed for nausea or vomiting 30 tablet 1   • spironolactone (ALDACTONE) 50 mg tablet Take 2 pills (100 mg) daily. Take with large glass of water. STOP IMMEDIATELY IF YOU BECOME UNINTENTIONALLY PREGNANT or if you decide to plan for pregnancy. Avoid large amounts of high potassium food or beverages while taking this medication. 180 tablet 3   • venlafaxine (EFFEXOR-XR) 75 mg 24 hr capsule Take 1 capsule (75 mg total) by mouth daily 90 capsule 1     No current facility-administered medications on file prior to visit.      Social History     Tobacco Use   • Smoking status: Never     Passive exposure: Never   • Smokeless tobacco: Never   • Tobacco comments:     Vape - ceased use    Vaping Use   • Vaping status: Former   • Substances: Nicotine, Flavoring   Substance and Sexual Activity   • Alcohol use: Yes     Comment: socially   • Drug use: No   • Sexual activity: Yes     Partners: Male     Birth control/protection: OCP       Objective   /62   Pulse 103   Ht 5' 3\" (1.6 m)   Wt 62.7 kg (138 lb 3.2 oz)   LMP  (Exact Date)   SpO2 98%   BMI 24.48 kg/m²     Physical Exam  Vitals and nursing note reviewed.   Constitutional:       General: She is not in acute distress.     Appearance: Normal appearance. She is well-developed. She is not ill-appearing.   HENT:      Head: Normocephalic and atraumatic.      Right Ear: Tympanic membrane, ear canal and external ear normal. There is no impacted cerumen.      Left Ear: Tympanic membrane, ear canal and external ear normal. There is no impacted cerumen.      Nose: Nose normal. No congestion. "      Mouth/Throat:      Mouth: Mucous membranes are moist.      Pharynx: No posterior oropharyngeal erythema.   Eyes:      Extraocular Movements: Extraocular movements intact.      Conjunctiva/sclera: Conjunctivae normal.      Pupils: Pupils are equal, round, and reactive to light.   Cardiovascular:      Rate and Rhythm: Normal rate and regular rhythm.      Heart sounds: No murmur heard.  Pulmonary:      Effort: Pulmonary effort is normal. No respiratory distress.      Breath sounds: Normal breath sounds.   Abdominal:      Palpations: Abdomen is soft.      Tenderness: There is no abdominal tenderness.   Musculoskeletal:         General: No swelling.      Cervical back: Normal range of motion and neck supple.      Right lower leg: No edema.      Left lower leg: No edema.   Lymphadenopathy:      Cervical: No cervical adenopathy.   Skin:     General: Skin is warm and dry.      Capillary Refill: Capillary refill takes less than 2 seconds.   Neurological:      General: No focal deficit present.      Mental Status: She is alert.   Psychiatric:         Mood and Affect: Mood normal.

## 2025-04-18 NOTE — PROGRESS NOTES
I was the supervising physician on  and was available via phone for assistance in this patient's management plan. I agree with the documentation of treatment rendered and plan as outlined.    Amber Becerra MD 04/18/25

## 2025-05-07 ENCOUNTER — OFFICE VISIT (OUTPATIENT)
Dept: FAMILY MEDICINE CLINIC | Facility: CLINIC | Age: 25
End: 2025-05-07
Payer: COMMERCIAL

## 2025-05-07 ENCOUNTER — HOSPITAL ENCOUNTER (OUTPATIENT)
Dept: RADIOLOGY | Facility: HOSPITAL | Age: 25
Discharge: HOME | End: 2025-05-07
Payer: COMMERCIAL

## 2025-05-07 VITALS
SYSTOLIC BLOOD PRESSURE: 104 MMHG | HEART RATE: 107 BPM | WEIGHT: 138.2 LBS | BODY MASS INDEX: 24.49 KG/M2 | HEIGHT: 63 IN | OXYGEN SATURATION: 100 % | DIASTOLIC BLOOD PRESSURE: 60 MMHG

## 2025-05-07 DIAGNOSIS — G89.29 CHRONIC PAIN OF RIGHT KNEE: Primary | ICD-10-CM

## 2025-05-07 DIAGNOSIS — S89.91XA INJURY OF RIGHT KNEE, INITIAL ENCOUNTER: ICD-10-CM

## 2025-05-07 DIAGNOSIS — M25.561 CHRONIC PAIN OF RIGHT KNEE: ICD-10-CM

## 2025-05-07 DIAGNOSIS — H18.891 CORNEAL IRRITATION OF RIGHT EYE: ICD-10-CM

## 2025-05-07 DIAGNOSIS — G89.29 CHRONIC PAIN OF RIGHT KNEE: ICD-10-CM

## 2025-05-07 DIAGNOSIS — M25.561 CHRONIC PAIN OF RIGHT KNEE: Primary | ICD-10-CM

## 2025-05-07 PROCEDURE — 73564 X-RAY EXAM KNEE 4 OR MORE: CPT

## 2025-05-07 PROCEDURE — 99214 OFFICE O/P EST MOD 30 MIN: CPT

## 2025-05-07 RX ORDER — OFLOXACIN 3 MG/ML
1 SOLUTION/ DROPS OPHTHALMIC 4 TIMES DAILY
Qty: 5 ML | Refills: 0 | Status: SHIPPED | OUTPATIENT
Start: 2025-05-07

## 2025-05-07 NOTE — PROGRESS NOTES
"Name: Brenda Darden      : 2000      MRN: [id number]  Encounter Provider: MAYRA Simmons  Encounter Date: 2025   Encounter department: [facility] [address]  :  Assessment & Plan  Chronic pain of right knee  Felt a tear in December, about 1 month after was on a walking trip and developed severe right lateral knee pain, has tried tylenol and ibuprofen, and has been doing 5-10 minutes of home physical therapy that was prescribed after prior knee injury daily for the past 5 months and bracing, has also tried ice, only resting helps with pain. Will do xray and recommend pt, referral placed to ortho and MRI if xray is negative.   Orders:  •  XR knee 4+ vw right injury; Future  •  MRI knee right  wo contrast; Future  •  Ambulatory Referral to Physical Therapy; Future    Injury of right knee, initial encounter  Felt a tear in December, about 1 month after was on a walking trip and developed severe right lateral knee pain, has tried tylenol and ibuprofen, and has been doing 5-10 minutes of home physical therapy that was prescribed after prior knee injury daily for the past 5 months and bracing, only resting helps with pain. Will do xray and recommend pt, referral placed to ortho and MRI if xray is negative.   Orders:  •  XR knee 4+ vw right injury; Future  •  MRI knee right  wo contrast; Future  •  Ambulatory Referral to Physical Therapy; Future    Corneal irritation of right eye  Eyedrops as prescribed follow-up with eye doctor if symptoms not improved  Orders:  •  ofloxacin (OCUFLOX) 0.3 % ophthalmic solution; Administer 1 drop to the right eye 4 (four) times a day           History of Present Illness   Chronic knee pain a few months ago had an injury and felt a \"tear\", about 1 month ago was walking in [place] and developed lateral right knee pain. She is wearing a stabilization brace with no relief.     Knee Pain       Review of Systems   Constitutional:  " "Negative for chills, diaphoresis and fever.   HENT:  Negative for congestion, ear pain and sore throat.    Respiratory:  Negative for cough and shortness of breath.    Cardiovascular:  Negative for chest pain and palpitations.   Gastrointestinal:  Negative for abdominal pain, constipation, diarrhea, nausea and vomiting.   Musculoskeletal:  Positive for arthralgias, gait problem and myalgias. Negative for back pain.   Skin:  Negative for rash.   Neurological:  Negative for dizziness and headaches.   All other systems reviewed and are negative.      Objective   /60 (BP Location: Right arm, Patient Position: Sitting, Cuff Size: Standard)   Pulse (!) 107   Ht 5' 3\" (1.6 m)   Wt 62.7 kg (138 lb 3.2 oz)   SpO2 100%   BMI 24.48 kg/m²      Physical Exam  Vitals and nursing note reviewed.   Constitutional:       General: She is not in acute distress.     Appearance: Normal appearance. She is well-developed. She is not ill-appearing.   HENT:      Head: Normocephalic and atraumatic.      Right Ear: Tympanic membrane, ear canal and external ear normal. There is no impacted cerumen.      Left Ear: Tympanic membrane, ear canal and external ear normal. There is no impacted cerumen.      Nose: Nose normal. No congestion.      Mouth/Throat:      Mouth: Mucous membranes are moist.      Pharynx: No posterior oropharyngeal erythema.   Eyes:      Extraocular Movements: Extraocular movements intact.      Conjunctiva/sclera: Conjunctivae normal.      Pupils: Pupils are equal, round, and reactive to light.   Cardiovascular:      Rate and Rhythm: Normal rate and regular rhythm.      Heart sounds: No murmur heard.  Pulmonary:      Effort: Pulmonary effort is normal. No respiratory distress.      Breath sounds: Normal breath sounds.   Abdominal:      Palpations: Abdomen is soft.      Tenderness: There is no abdominal tenderness.   Musculoskeletal:         General: Tenderness present. No swelling.      Cervical back: Normal range of " motion and neck supple.      Right lower leg: No edema.      Left lower leg: No edema.   Lymphadenopathy:      Cervical: No cervical adenopathy.   Skin:     General: Skin is warm and dry.      Capillary Refill: Capillary refill takes less than 2 seconds.   Neurological:      General: No focal deficit present.      Mental Status: She is alert.   Psychiatric:         Mood and Affect: Mood normal.

## 2025-05-09 ENCOUNTER — RESULTS FOLLOW-UP (OUTPATIENT)
Dept: FAMILY MEDICINE CLINIC | Facility: CLINIC | Age: 25
End: 2025-05-09

## 2025-05-12 ENCOUNTER — ANNUAL EXAM (OUTPATIENT)
Dept: OBGYN CLINIC | Facility: CLINIC | Age: 25
End: 2025-05-12
Payer: COMMERCIAL

## 2025-05-12 VITALS
BODY MASS INDEX: 24.45 KG/M2 | DIASTOLIC BLOOD PRESSURE: 64 MMHG | WEIGHT: 138 LBS | HEIGHT: 63 IN | SYSTOLIC BLOOD PRESSURE: 102 MMHG

## 2025-05-12 DIAGNOSIS — Z30.431 SURVEILLANCE OF INTRAUTERINE CONTRACEPTION: ICD-10-CM

## 2025-05-12 DIAGNOSIS — Z01.419 ENCOUNTER FOR GYNECOLOGICAL EXAMINATION WITHOUT ABNORMAL FINDING: Primary | ICD-10-CM

## 2025-05-12 PROCEDURE — S0612 ANNUAL GYNECOLOGICAL EXAMINA: HCPCS | Performed by: NURSE PRACTITIONER

## 2025-05-12 NOTE — PATIENT INSTRUCTIONS
Patient Education     Lowering Your Risk of Breast Cancer   About this topic   Breast cancer is a serious illness. Breast cancer is when abnormal cells grow and divide more quickly in your breast. These cells form a growth or tumor. The abnormal cells may enter nearby tissue and spread to other parts of the body. It is the type of cancer most often seen in women. Men can have breast cancer, but it is a rare condition.  General   Some things in your life may increase your risk of breast cancer. You may not be able to change some of these. Others you can control.  You are more likely to get breast cancer if you:  Have a mother, sister, or daughter who has had breast cancer  Have used hormones for menopause for more than 5 years  Have had radiation therapy to the breast or chest in the past  Are overweight or do not exercise  Had your first menstrual period before you were 11 years old  Went through menopause after age 56  Have never been pregnant or had your first child after age 35  Have had breast cancer before  Drink alcohol in any form  Have dense breasts  Are older in age  There is no certain way to prevent breast cancer. There are things you can do to lower your chances of having breast cancer.  Keep a healthy weight. Lose weight if you are overweight. Being overweight raises your chances of having breast cancer.  Eat a healthy diet to maintain a healthy weight, such as more fruits, vegetables, and lean cuts of meat. Decrease the amount of saturated fat in your diet.  Exercise. Being active helps you keep a healthy weight.  Limit your alcohol intake or do not drink alcohol. The more alcohol you drink, the higher your risk.  Do not smoke cigarettes. Smoking can increase your risk of many types of cancer.  Breastfeed your baby. This may help protect you. The longer you breastfeed, the more protection you have.  Talk with your doctor about:  Limiting or stopping hormone therapy.  Taking certain drugs to prevent  breast cancer. For women at high risk of having breast cancer, there are a few drugs that may lower your risk.  Surgery to prevent you from having breast cancer if you are very high risk.  When do I need to call the doctor?   Changes in your breasts  A lump or area in your breast that feels different  Discharge from your nipple  Skin on your breast is dimpled or indented  You have questions or concerns about your breasts  Helpful tips   Talk to your doctor about the best kind of breast cancer screening for you.  If you want to do self breast exams, have your doctor show you the right way to do them.  Tell your doctor of any abnormal finding.  Last Reviewed Date   2021-10-04  Consumer Information Use and Disclaimer   This generalized information is a limited summary of diagnosis, treatment, and/or medication information. It is not meant to be comprehensive and should be used as a tool to help the user understand and/or assess potential diagnostic and treatment options. It does NOT include all information about conditions, treatments, medications, side effects, or risks that may apply to a specific patient. It is not intended to be medical advice or a substitute for the medical advice, diagnosis, or treatment of a health care provider based on the health care provider's examination and assessment of a patient’s specific and unique circumstances. Patients must speak with a health care provider for complete information about their health, medical questions, and treatment options, including any risks or benefits regarding use of medications. This information does not endorse any treatments or medications as safe, effective, or approved for treating a specific patient. UpToDate, Inc. and its affiliates disclaim any warranty or liability relating to this information or the use thereof. The use of this information is governed by the Terms of Use, available at  https://www.[employer] Analyticsuwer.com/en/know/clinical-effectiveness-terms   Copyright   Copyright © 2024 UpToDate, Inc. and its affiliates and/or licensors. All rights reserved.

## 2025-05-12 NOTE — PROGRESS NOTES
Name: Brenda Darden      : 2000      MRN: [id number]  Encounter Provider: MAYRA Garza  Encounter Date: 2025   Encounter department: [facility]  :  Assessment & Plan  Encounter for gynecological examination without abnormal finding  Pap every 3 years if normal.  STI testing as indicated.  Exercise most days of week-minimum of 150-300 minutes per week.   Obtain appropriate diet and hydration.   Calcium 1000mg (in divided doses-max 600 mg at one time) + 600 vit D daily.  Condom use when sexually active for sexually transmitted infection prevention.   Gardasil series recommended if not received yet.    Call your insurance company to verify coverage prior to completing any ordered tests.   Return to office in one year or sooner, if needed.          Surveillance of intrauterine contraception  Mirena was placed 2022           History of Present Illness   HPI  Brenda Darden is a 24 y.o. female who presents for a routine annual visit. She denies any issues with bleeding or her menses. She had a Mirena placed 2022 and has mostly amenorrhea with it. First pap was normal on 2023. Denies vaginal issues. Denies pelvic pain/dyspareunia. Denies any issues with her BCM. Sexually active without any concerns. GC/CT neg , same partner x 7 yrs. Engaged now! Received Gardasils x 3 in .     Menarche- 15 Y/O  Last Pap Smear- 2023 NILM  Hx of abn pap- No.  Gardisil series- Completed  LMP-   Birth control- Mirena    Non smoker  Former vape user  Social drinker  Currently sexually active  STD testing- no  No family history of ovarian, cervical or breast cancer. +FHX of uterine cancer in PGM    No concerns/questions for today's visit        Review of Systems   Constitutional:  Negative for chills, fatigue, fever and unexpected weight change.   Respiratory:  Negative for shortness of breath.    Gastrointestinal:  Negative for anal bleeding,  "blood in stool, constipation and diarrhea.        +IBS constipation and diarrhea, sees GI   Genitourinary:  Negative for difficulty urinating, dysuria and hematuria.   Neurological:  Negative for weakness.   Psychiatric/Behavioral:  Negative for agitation, behavioral problems and confusion.           Objective   /64 (BP Location: Left arm, Patient Position: Sitting, Cuff Size: Adult)   Ht 5' 3\" (1.6 m)   Wt 62.6 kg (138 lb)   BMI 24.45 kg/m²      Physical Exam  Constitutional:       General: She is not in acute distress.     Appearance: She is well-developed.   HENT:      Head: Normocephalic.   Pulmonary:      Effort: Pulmonary effort is normal.   Chest:   Breasts:     Breasts are symmetrical.      Right: No inverted nipple, mass, nipple discharge, skin change or tenderness.      Left: No inverted nipple, mass, nipple discharge, skin change or tenderness.   Abdominal:      Palpations: Abdomen is soft.   Genitourinary:     Exam position: Supine.      Labia:         Right: No rash, tenderness, lesion or injury.         Left: No rash, tenderness, lesion or injury.       Vagina: No signs of injury and foreign body. No vaginal discharge, erythema or tenderness.      Cervix: No cervical motion tenderness, discharge or friability.      Uterus: Not deviated, not enlarged, not fixed and not tender.       Adnexa:         Right: No mass, tenderness or fullness.          Left: No mass, tenderness or fullness.        Comments: IUD strings seen from os.  Musculoskeletal:      Cervical back: Normal range of motion and neck supple.           "

## 2025-06-06 DIAGNOSIS — F41.1 GENERALIZED ANXIETY DISORDER: ICD-10-CM

## 2025-06-06 RX ORDER — VENLAFAXINE HYDROCHLORIDE 75 MG/1
75 CAPSULE, EXTENDED RELEASE ORAL DAILY
Qty: 90 CAPSULE | Refills: 1 | Status: SHIPPED | OUTPATIENT
Start: 2025-06-06 | End: 2025-12-03

## 2025-06-09 ENCOUNTER — TELEPHONE (OUTPATIENT)
Dept: NEUROLOGY | Facility: CLINIC | Age: 25
End: 2025-06-09

## 2025-06-09 NOTE — TELEPHONE ENCOUNTER
Attempted to confirm upcoming appointment, call rang and sounded like it connected but no one was there. I tried again and the same thing happened.

## 2025-06-12 ENCOUNTER — OFFICE VISIT (OUTPATIENT)
Dept: NEUROLOGY | Facility: CLINIC | Age: 25
End: 2025-06-12
Payer: COMMERCIAL

## 2025-06-12 ENCOUNTER — EVALUATION (OUTPATIENT)
Dept: PHYSICAL THERAPY | Facility: CLINIC | Age: 25
End: 2025-06-12
Payer: COMMERCIAL

## 2025-06-12 VITALS
SYSTOLIC BLOOD PRESSURE: 140 MMHG | BODY MASS INDEX: 24.8 KG/M2 | DIASTOLIC BLOOD PRESSURE: 82 MMHG | WEIGHT: 140 LBS | HEIGHT: 63 IN | HEART RATE: 92 BPM | OXYGEN SATURATION: 99 %

## 2025-06-12 DIAGNOSIS — G44.89 OTHER HEADACHE SYNDROME: Primary | ICD-10-CM

## 2025-06-12 DIAGNOSIS — M25.561 CHRONIC PAIN OF RIGHT KNEE: Primary | ICD-10-CM

## 2025-06-12 DIAGNOSIS — G89.29 CHRONIC PAIN OF RIGHT KNEE: Primary | ICD-10-CM

## 2025-06-12 PROCEDURE — 97161 PT EVAL LOW COMPLEX 20 MIN: CPT | Performed by: PHYSICAL THERAPIST

## 2025-06-12 PROCEDURE — 97112 NEUROMUSCULAR REEDUCATION: CPT | Performed by: PHYSICAL THERAPIST

## 2025-06-12 PROCEDURE — 99204 OFFICE O/P NEW MOD 45 MIN: CPT | Performed by: PSYCHIATRY & NEUROLOGY

## 2025-06-12 NOTE — ASSESSMENT & PLAN NOTE
Differential diagnosis of headache discussed with the patient most likely migraine headaches, would recommend an MRI scan of the brain and blood work to evaluate for her symptoms, patient to call me after the above test to discuss the results.  Different prophylactic medications were discussed with the patient since her headaches are only happening once or twice a month would wait for a daily prophylactic medication I advised her to take riboflavin 400 mg a day in the morning and magnesium 100 mg at nighttime to see if it will help with the headaches.    I did not see any swelling on the right temporal area I am not sure if it is a muscle spasm she was advised to continue follow-up with her other physicians.    She was advised to avoid migraine triggers which we discussed in detail including foods to avoid, to keep herself well-hydrated avoid excessive use of over-the-counter analgesics and to limit caffeine for 12 to 16 ounces per day avoid stress and to sleep regularly for 7 to 8 hours a night and do some breathing exercises and meditation.    She was advised to keep a blood pressure, cholesterol, sugar and weight under control to eat a healthy nutritious diet, avoid alcohol.  To go to the hospital if has any worsening symptoms and call me otherwise to see me back in 3 to 4 months or sooner if needed and follow-up with the other physicians.

## 2025-06-12 NOTE — PROGRESS NOTES
PT Evaluation     Today's date: 2025  Patient name: Brenda Darden  : 2000  MRN: [id number]  Referring provider: Sandrine Ott CRNP  Dx:   Encounter Diagnosis     ICD-10-CM    1. Chronic pain of right knee  M25.561     G89.29                      Assessment    Assessment details: Patient is a 24-year-old female presenting to physical therapy with chief complaints of chronic right knee pain.  Mechanical assessment of the right knee was performed which demonstrated a positive varus test at 0 degrees and negative medial and lateral Annalise's testing, negative Lachman test, negative posterior drawer test.  She does have weakness in the hip abductors and also has weakness in the knee extensors and knee flexors.  Her natural resting position of the right lower extremity is with hip internal rotation and slight knee valgus.  When assessing the arch of her feet she does demonstrate a mild pes planus bilaterally.  I did discuss with her the kinematics of the lower extremity when an individual has flatfeet.  I recommended to her that she seek out over-the-counter orthotics to try to help with better positioning.  Functionally, the patient has difficulty walking, running, going up and down stairs, squatting, standing for long periods of time.  Patient would benefit from skilled physical therapy services to address impairments and maximize function.    Goals  Short-term goals-4 weeks  1.  Decrease subjective pain by 2 points  2.  Able to be independent with her home exercise program  Long term goals-8 weeks  1.  50% reduction in pain while standing for work  2.  Able to go up and down a flight of stairs with at least 50% improvement  3.  Able to complete community ambulation without functional limitation  4.  Able to manage symptoms independently    Plan  Patient would benefit from: skilled physical therapy    Planned therapy interventions: manual therapy, neuromuscular re-education, strengthening,  stretching, therapeutic activities, therapeutic exercise, IADL retraining, home exercise program, abdominal trunk stabilization, activity modification and balance    Frequency: 1-2x week  Duration in weeks: 8        Subjective Evaluation    History of Present Illness  Mechanism of injury: Patient reports a longstanding history of bilateral patellofemoral pain that she has previously been seen for in physical therapy.  However approximately 6 months ago she reports that she was walking and felt a tearing sensation along the lateral aspect of the right knee.  She was able to continue ambulating but with significant difficulty.  As time went on her pain progressively got better but never fully resolved.  She followed up with her PCP, x-rays of the right knee were performed which were negative for sinister pathology.  She does have an MRI of the right knee scheduled for tomorrow.  She does report having a locking sensation in the right knee denies any buckling sensations.  Denies any numbness tingling or burning down the upper extremity.  Patient goals are to be able to stand and return to being active without significant pain in the right knee.  Patient Goals  Patient goals for therapy: decreased pain, increased strength, independence with ADLs/IADLs and return to sport/leisure activities    Pain  Current pain ratin  At best pain ratin  At worst pain ratin          Objective     Observations     Additional Observation Details  R leg is positioned in knee valgus with hip internal rotation     Tenderness   Left Knee   Tenderness in the lateral patella, LCL (distal) and LCL (proximal). No tenderness in the inferior patella, ITB, lateral joint line, lateral retinaculum, MCL (distal), MCL (proximal), medial retinaculum, patellar tendon and quadriceps tendon.     Right Knee   Tenderness in the lateral patella, LCL (distal) and LCL (proximal). No tenderness in the inferior patella, ITB, lateral joint line, lateral  "retinaculum, MCL (distal), MCL (proximal), medial retinaculum, patellar tendon and quadriceps tendon.     Neurological Testing     Sensation     Knee   Left Knee   Intact: Light touch    Right Knee   Intact: light touch     Active Range of Motion   Left Knee   Normal active range of motion    Right Knee   Normal active range of motion    Mobility   Patellar Mobility:     Right Knee   WFL: medial, lateral, superior and inferior    Patellar Static Positioning   Right Knee: lateral tilt    Strength/Myotome Testing     Right Knee   Flexion: 4  Extension: 4  Quadriceps contraction: fair    Additional Strength Details  R hip strength  Abduction 4/5  Extension 4/5    Tests     Right Knee   Positive varus stress test at 0 degrees.   Negative lateral Annalise, medial Annalise and valgus stress test at 0 degrees.     General Comments:      Knee Comments  Squatting - no knee valgus  Single leg squat - knee valgus present.    (+) tightness with hip ER.               Precautions:     Access Code: [email or web address]  URL: [email or web address]  Date: 06/12/2025  Prepared by: Deon Vick    Exercises  - Single Leg Bridge  - 1 x daily - 7 x weekly - 2 sets - 10 reps  - Side Plank on Knees  - 1 x daily - 7 x weekly - 2 sets - 10 reps - 5 hold    POC expires Unit limit Auth Expiration date PT/OT/ST + Visit Limit?   8/12 4 12/31 47                           Visit/Unit Tracking  AUTH Status:  Date 6/12              47 Used 1               Remaining  46                    Manuals 6/12                                                                Neuro Re-Ed             Single leg bridge 3x10            Modified side plank 5\"x10            Education Posture, flat feet, orthotics.              SLS rebounder toss             Standigng hip er/abd with ball toss iso                                       Ther Ex             Upright bike             Lateral squat walk             Single leg leg press             Deadlifting              "                                                    Ther Activity             TRX SL squats             Lateral step downs              Gait Training                                       Modalities

## 2025-06-12 NOTE — PROGRESS NOTES
Neurology Ambulatory Visit  Name: Brenda Darden       : 2000       MRN: [id number]   Encounter Provider: Lily Jose MD   Encounter Date: 2025  Encounter department: [facility]      Brenda Darden is a 24 y.o. female.       Assessment & Plan  Other headache syndrome       Differential diagnosis of headache discussed with the patient most likely migraine headaches, would recommend an MRI scan of the brain and blood work to evaluate for her symptoms, patient to call me after the above test to discuss the results.  Different prophylactic medications were discussed with the patient since her headaches are only happening once or twice a month would wait for a daily prophylactic medication I advised her to take riboflavin 400 mg a day in the morning and magnesium 100 mg at nighttime to see if it will help with the headaches.    I did not see any swelling on the right temporal area I am not sure if it is a muscle spasm she was advised to continue follow-up with her other physicians.    She was advised to avoid migraine triggers which we discussed in detail including foods to avoid, to keep herself well-hydrated avoid excessive use of over-the-counter analgesics and to limit caffeine for 12 to 16 ounces per day avoid stress and to sleep regularly for 7 to 8 hours a night and do some breathing exercises and meditation.    She was advised to keep a blood pressure, cholesterol, sugar and weight under control to eat a healthy nutritious diet, avoid alcohol.  To go to the hospital if has any worsening symptoms and call me otherwise to see me back in 3 to 4 months or sooner if needed and follow-up with the other physicians.      Subjective:  Chief Complaint   Patient presents with    Mass    Headache       HISTORY OF PRESENT ILLNESS    24-year-old right-handed pleasant female with past medical history of 3 concussions and an occipital bone fracture in 2017 she tells me that she  "recovered from that here for evaluation of headaches on the right side of the head and sometimes she would feel that she has a slight swelling on the right temporal area she was seen by her primary care physician and referred to a general surgeon who told her that it was most likely a muscle spasm but they wanted to be sure and hence they wanted neurology to see the patient, her headache is 3-4 on 10 maybe once or twice a month its mostly in the right temporal area associated with photophobia phonophobia no nausea no vomiting no visual aura, her headaches last for couple of hours she will take ibuprofen for her headaches to get relieved sometimes she would feel tenderness and numbness in the right temporal area, she thinks the symptoms have been going on for the last 1 year, 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             Past Medical History:    Past Medical History[1]  Past Surgical History[2]    Family History:  Family History[3]    Social History:  Social History[4]   Living situation:    Work:      Allergies:  Allergies[5]    Medications:  Current Medications[6]    Objective:  /82 (BP Location: Left arm, Patient Position: Sitting, Cuff Size: Standard)   Pulse 92   Ht 5' 3\" (1.6 m)   Wt 63.5 kg (140 lb)   SpO2 99%   BMI 24.80 kg/m²      Labs  I have reviewed pertinent labs:  CBC:   Lab Results   Component Value Date    WBC 5.36 02/12/2025    RBC 4.89 02/12/2025    HGB 14.4 02/12/2025    HCT 44.4 02/12/2025    MCV 91 02/12/2025     02/12/2025    MCH 29.4 02/12/2025    MCHC 32.4 02/12/2025    RDW 11.3 (L) 02/12/2025    MPV 8.8 (L) 02/12/2025 "    NEUTROABS 2.39 02/12/2025     CMP:   Lab Results   Component Value Date    SODIUM 138 02/12/2025    K 4.4 02/12/2025     02/12/2025    CO2 29 02/12/2025    AGAP 5 02/12/2025    BUN 11 02/12/2025    CREATININE 0.85 02/12/2025    GLUC 79 10/24/2018    GLUF 89 02/12/2025    CALCIUM 9.8 02/12/2025    AST 18 02/12/2025    ALT 12 02/12/2025    ALKPHOS 83 02/12/2025    TP 7.7 02/12/2025    ALB 5.0 02/12/2025    TBILI 0.47 02/12/2025    EGFR 96 02/12/2025     Thyroid Studies:   Lab Results   Component Value Date    SFF9LWYTHAHL 1.874 02/12/2025    FREET4 0.92 03/29/2022     Hemoglobin A1C/EST AVG Glucose   Lab Results   Component Value Date    HGBA1C 5.1 02/12/2025     02/12/2025     Vitamin D Level   Lab Results   Component Value Date    FNBI66SMTBYO 33.1 02/12/2025     Vitamin B12   Lab Results   Component Value Date    AZQYZTBQ74 466 03/01/2023              General Exam  GENERAL APPEARANCE:  No distress, alert, interactive and cooperative.  CARDIOVASCULAR: Warm and well perfused  LUNGS: normal work of breathing on room air  EXTREMITIES: no peripheral edema     Neurologic Exam  MENTAL STATE:  Orientation was normal to time, place and person without aphasia or apraxia. Recent and remote memory was intact.  Attention span and concentration were normal. Language testing was normal for comprehension, repetition, expression, and naming.     CRANIAL NERVES:  CN 2       visual fields full to confrontation.  Visual acuity is 20/20 with hand-held chart  CN 3, 4, 6  Pupils round, 4 mm in diameter, equally reactive to light. Lids symmetric; no ptosis. EOMs normal alignment, full range. No nystagmus.  CN 5  Facial sensation intact bilaterally.  CN 7  Normal and symmetric facial strength.   CN 8  Hearing intact to finger rub bilaterally.  CN 9  Palate elevates symmetrically.  CN 11  Normal strength of shoulder shrug and neck turning.  CN 12  Tongue midline, with normal bulk and strength.     MOTOR:  Motor exam was  normal. Muscle bulk and tone were normal in both upper and lower extremities. Muscle strength was 5/5 in distal and proximal muscles in both upper and lower extremities. No fasciculations, tremor or other abnormal movements were present.     REFLEXES:  RIGHT UE   LEFT UE  BR:2              BR:2    Biceps:2      Biceps:2    Triceps:2     Triceps:2       RIGHT LLE   LEFT LLE    Knee:2           Knee:2    Ankle:2         Ankle:2    Babinski: toes downgoing to plantar stimulation. No clonus.     SENSORY:  Intact to temperature and vibratory sensation in the upper and lower extremities bilaterally. Cortical function is intact.    COORDINATION:   Coordination exam was normal. In both upper extremities, finger-nose-finger was intact without dysmetria or overshoot.      GAIT:  Gait was normal. Station was stable with a normal base. Gait was stable with a normal arm swing and speed. Tandem gait was intact. No Romberg sign was present.      ROS:  Review of Systems   Constitutional:  Negative for appetite change, fatigue and fever.   HENT: Negative.  Negative for hearing loss, tinnitus, trouble swallowing and voice change.    Eyes: Negative.  Negative for photophobia, pain and visual disturbance.   Respiratory: Negative.  Negative for shortness of breath.    Cardiovascular: Negative.  Negative for palpitations.   Gastrointestinal: Negative.  Negative for nausea and vomiting.   Endocrine: Negative.  Negative for cold intolerance.   Genitourinary: Negative.  Negative for dysuria, frequency and urgency.   Musculoskeletal:  Negative for back pain, gait problem, myalgias, neck pain and neck stiffness.   Skin: Negative.  Negative for rash.   Allergic/Immunologic: Negative.    Neurological:  Negative for dizziness, tremors, seizures, syncope, facial asymmetry, speech difficulty, weakness, light-headedness and numbness.   Hematological: Negative.  Does not bruise/bleed easily.   Psychiatric/Behavioral: Negative.  Negative for  confusion, hallucinations and sleep disturbance.        I have reviewed the past medical history, surgical history, social and family history, current medications, allergies vitals, review of systems, and updated this information as appropriate today.    Administrative Statements   The total amount of time spent with the patient and on chart review and documentation was minutes. Issues addressed during this clinic visit included overall management, medication counseling or monitoring, and counseling and coordination of care.         Lily Jose MD                [1]   Past Medical History:  Diagnosis Date    Acne     Anxiety     Asthma 2008    Chronic vaginitis 03/15/2017    Concussion     Last Assessed: 11/15/2017    Depression     Fractures     Head injury     Irritable bowel syndrome about a year ago    diagnosed after colonoscopy    Other headache syndrome 6/12/2025    Tinea pedis     Verruca    [2]   Past Surgical History:  Procedure Laterality Date    APPENDECTOMY      COLONOSCOPY      INSERTION OF INTRAUTERINE DEVICE (IUD) N/A     SKIN BIOPSY  6/6/23   [3]   Family History  Problem Relation Name Age of Onset    Restless legs syndrome Mother Elizabeth Adelso     Depression Mother Elizabeth Adelso     Anxiety disorder Mother Elizabeth Adelso     Depression Father Geof     Colon cancer Paternal Grandmother Bessy     Uterine cancer Paternal Grandmother Bessy     Cancer Paternal Grandmother Bessy     OCD Sister Sharri     Acne Sister Sharri     Anxiety disorder Brother Ruddy     Hashimoto's thyroiditis Maternal Grandmother Yamila Suzette     Breast cancer Maternal Grandmother Yamila Suzette     Stroke Maternal Grandmother Yamila Germainbreezy         Due to congenital heart defect    Depression Paternal Aunt Sarah and Liseth     Ovarian cancer Neg Hx      Cervical cancer Neg Hx     [4]   Social History  Tobacco Use    Smoking status: Never     Passive exposure: Never    Smokeless tobacco: Former    Tobacco  comments:     3 years ago smoke very rarely.   Vaping Use    Vaping status: Former    Substances: Nicotine, Flavoring   Substance Use Topics    Alcohol use: Yes     Alcohol/week: 1.0 standard drink of alcohol     Types: 1 Standard drinks or equivalent per week     Comment: socially    Drug use: No   [5] No Known Allergies  [6]   Current Outpatient Medications:     albuterol (PROVENTIL HFA,VENTOLIN HFA) 90 mcg/act inhaler, Inhale 1-2 puffs, Disp: , Rfl: 0    Dapsone 7.5 % GEL, Apply 1 Application topically in the morning, Disp: 60 g, Rfl: 3    dicyclomine (BENTYL) 20 mg tablet, Take 1 tablet (20 mg total) by mouth every 6 (six) hours, Disp: 60 tablet, Rfl: 0    ergocalciferol (VITAMIN D2) 50,000 units, Take 1 capsule (50,000 Units total) by mouth once a week, Disp: 16 capsule, Rfl: 1    hydrOXYzine HCL (ATARAX) 25 mg tablet, Take 1 tablet (25 mg total) by mouth daily as needed (moderate-severe anxiety), Disp: 30 tablet, Rfl: 0    Levonorgestrel (MIRENA) 20 MCG/DAY IUD, 1 each by Intrauterine route once, Disp: , Rfl:     ofloxacin (OCUFLOX) 0.3 % ophthalmic solution, Administer 1 drop to the right eye 4 (four) times a day, Disp: 5 mL, Rfl: 0    ondansetron (ZOFRAN-ODT) 4 mg disintegrating tablet, Take 1 tablet (4 mg total) by mouth every 6 (six) hours as needed for nausea or vomiting, Disp: 30 tablet, Rfl: 1    spironolactone (ALDACTONE) 50 mg tablet, Take 2 pills (100 mg) daily. Take with large glass of water. STOP IMMEDIATELY IF YOU BECOME UNINTENTIONALLY PREGNANT or if you decide to plan for pregnancy. Avoid large amounts of high potassium food or beverages while taking this medication., Disp: 180 tablet, Rfl: 3    venlafaxine (EFFEXOR-XR) 75 mg 24 hr capsule, Take 1 capsule (75 mg total) by mouth daily, Disp: 90 capsule, Rfl: 1

## 2025-06-13 ENCOUNTER — HOSPITAL ENCOUNTER (OUTPATIENT)
Dept: MRI IMAGING | Facility: CLINIC | Age: 25
Discharge: HOME | End: 2025-06-13
Payer: COMMERCIAL

## 2025-06-13 DIAGNOSIS — G89.29 CHRONIC PAIN OF RIGHT KNEE: ICD-10-CM

## 2025-06-13 DIAGNOSIS — S89.91XA INJURY OF RIGHT KNEE, INITIAL ENCOUNTER: ICD-10-CM

## 2025-06-13 DIAGNOSIS — M25.561 CHRONIC PAIN OF RIGHT KNEE: ICD-10-CM

## 2025-06-13 PROCEDURE — 73721 MRI JNT OF LWR EXTRE W/O DYE: CPT

## 2025-06-17 ENCOUNTER — OFFICE VISIT (OUTPATIENT)
Dept: PHYSICAL THERAPY | Facility: CLINIC | Age: 25
End: 2025-06-17
Payer: COMMERCIAL

## 2025-06-17 DIAGNOSIS — G89.29 CHRONIC PAIN OF RIGHT KNEE: Primary | ICD-10-CM

## 2025-06-17 DIAGNOSIS — M25.561 CHRONIC PAIN OF RIGHT KNEE: Primary | ICD-10-CM

## 2025-06-17 PROCEDURE — 97110 THERAPEUTIC EXERCISES: CPT

## 2025-06-17 NOTE — PROGRESS NOTES
"Daily Note     Today's date: 2025  Patient name: Brenda Darden  : 2000  MRN: [id number]  Referring provider: Sandrine Ott CRNP  Dx:   Encounter Diagnosis     ICD-10-CM    1. Chronic pain of right knee  M25.561     G89.29           Start Time: 1730  Stop Time: 1808  Total time in clinic (min): 38 minutes    Subjective: Pt reports no difficulty/pain with her HEP.       Objective: See treatment diary below      Assessment: Pt showed decreased stability w/ eccentric let down during SL leg press w. Medial-lateral sway. HEP revised and reviewed. Tolerated treatment well. Patient demonstrated fatigue post treatment and would benefit from continued PT      Plan: Continue per plan of care.      Precautions:     Access Code: [email or web address]  URL: [email or web address]  Date: 2025  Prepared by: Deon Vick    Exercises  - Single Leg Bridge  - 1 x daily - 7 x weekly - 2 sets - 10 reps  - Side Plank on Knees  - 1 x daily - 7 x weekly - 2 sets - 10 reps - 5 hold    POC expires Unit limit Auth Expiration date PT/OT/ST + Visit Limit?    4  47                           Visit/Unit Tracking  AUTH Status:  Date               47 Used 1               Remaining  46                    Manuals                                                                Neuro Re-Ed             Single leg bridge 3x10 3x10            Modified side plank 5\"x10            Education Posture, flat feet, orthotics.              SLS rebounder toss             Standigng hip er/abd with ball toss iso                                       Ther Ex             Upright bike  6'           Lateral squat walk  Blue TB  5x10ft lap           Single leg leg press  3X8 95# R  3x8 75# L            Deadlifting  X5 bar                                                               Ther Activity             TRX SL squats             Lateral step downs              Gait Training                                     "   Modalities

## 2025-06-19 ENCOUNTER — OFFICE VISIT (OUTPATIENT)
Dept: PHYSICAL THERAPY | Facility: CLINIC | Age: 25
End: 2025-06-19
Payer: COMMERCIAL

## 2025-06-19 DIAGNOSIS — M25.561 CHRONIC PAIN OF RIGHT KNEE: Primary | ICD-10-CM

## 2025-06-19 DIAGNOSIS — G89.29 CHRONIC PAIN OF RIGHT KNEE: Primary | ICD-10-CM

## 2025-06-19 PROCEDURE — 97110 THERAPEUTIC EXERCISES: CPT | Performed by: PHYSICAL THERAPIST

## 2025-06-19 PROCEDURE — 97112 NEUROMUSCULAR REEDUCATION: CPT | Performed by: PHYSICAL THERAPIST

## 2025-06-19 PROCEDURE — 97530 THERAPEUTIC ACTIVITIES: CPT | Performed by: PHYSICAL THERAPIST

## 2025-06-19 NOTE — PROGRESS NOTES
"Daily Note     Today's date: 2025  Patient name: Brenda Darden  : 2000  MRN: [id number]  Referring provider: Sandrine Ott CRNP  Dx:   Encounter Diagnosis     ICD-10-CM    1. Chronic pain of right knee  M25.561     G89.29                      Subjective: Patient reports that she had some work out soreness after her last visit but nothing significant.        Objective: See treatment diary below      Assessment: Patient did have some limited proprioception today with continued medial/lateral instability in the RLE.  Decreased eccentric control does remain in the lower extremity.  During squat based activities, cueing required to avoid having a knee valgus maneuver.  Fatigue was present post intervention.        Plan: Continue per plan of care.      Precautions: Universal    Access Code: [email or web address]  URL: [email or web address]  Date: 2025  Prepared by: Deon Vick    Exercises  - Single Leg Bridge  - 1 x daily - 7 x weekly - 2 sets - 10 reps  - Side Plank on Knees  - 1 x daily - 7 x weekly - 2 sets - 10 reps - 5 hold    POC expires Unit limit Auth Expiration date PT/OT/ST + Visit Limit?    4  47                           Visit/Unit Tracking  AUTH Status:  Date             47 Used 1 2 3             Remaining  46 45 44                  Manuals                                                               Neuro Re-Ed             Single leg bridge 3x10 3x10  2x10 ea          Modified side plank 5\"x10            Education Posture, flat feet, orthotics.              SLS rebounder toss   Foam 2x10          Standigng hip er/abd with ball toss iso             Single leg rockerboard   20xea AP                       Ther Ex             Upright bike  6' 5'          Lateral squat walk  Blue TB  5x10ft lap           Single leg leg press  3X8 95# R  3x8 75# L            Deadlifting  X5 bar Kickstand 10# 3x10          HSS   30\"x4                                 "                 Ther Activity             TRX SL squats             STS   10# 3x10          Lateral step downs              Gait Training                                       Modalities

## 2025-06-24 ENCOUNTER — OFFICE VISIT (OUTPATIENT)
Dept: PHYSICAL THERAPY | Facility: CLINIC | Age: 25
End: 2025-06-24
Payer: COMMERCIAL

## 2025-06-24 DIAGNOSIS — G89.29 CHRONIC PAIN OF RIGHT KNEE: Primary | ICD-10-CM

## 2025-06-24 DIAGNOSIS — M25.561 CHRONIC PAIN OF RIGHT KNEE: Primary | ICD-10-CM

## 2025-06-24 PROCEDURE — 97110 THERAPEUTIC EXERCISES: CPT

## 2025-06-24 PROCEDURE — 97112 NEUROMUSCULAR REEDUCATION: CPT

## 2025-06-24 NOTE — PROGRESS NOTES
"Daily Note     Today's date: 2025  Patient name: Brenda Darden  : 2000  MRN: [id number]  Referring provider: Sandrine Ott CRNP  Dx:   Encounter Diagnosis     ICD-10-CM    1. Chronic pain of right knee  M25.561     G89.29             Start Time: 934  Stop Time: 1015  Total time in clinic (min): 41 minutes    Subjective: Patient reports that she had some work out soreness after her last visit but nothing significant.        Objective: See treatment diary below      Assessment: Patient shows increased strength during TE w/ improved stability noted through decrease valgus and sway during TE.  Fatigue was present post intervention.  Pt continues to benefit from skilled PT in order to progress.       Plan: Continue per plan of care.      Precautions: Universal    Access Code: [email or web address]  URL: [email or web address]  Date: 2025  Prepared by: Deon Vick    Exercises  - Single Leg Bridge  - 1 x daily - 7 x weekly - 2 sets - 10 reps  - Side Plank on Knees  - 1 x daily - 7 x weekly - 2 sets - 10 reps - 5 hold    POC expires Unit limit Auth Expiration date PT/OT/ST + Visit Limit?    4  47                           Visit/Unit Tracking  AUTH Status:  Date             47 Used 1 2 3             Remaining  46 45 44                  Manuals                                                              Neuro Re-Ed             Single leg bridge 3x10 3x10  2x10 ea 2x10 ea         Modified side plank 5\"x10            Education Posture, flat feet, orthotics.              SLS rebounder toss   Foam 2x10 Foam 2x10         Standigng hip er/abd with ball toss iso             Single leg rockerboard   20xea AP 20xea AP                      Ther Ex             Upright bike  6' 5' 5'         Lateral squat walk  Blue TB  5x10ft lap           Single leg leg press  3X8 95# R  3x8 75# L   3x8 75# ea   X10 #          Deadlifting  X5 bar Kickstand 10# 3x10        " "  HSS   30\"x4 30\"x4                                                Ther Activity             TRX SL squats             STS   10# 3x10 10# 2x10         Lateral step downs              Gait Training                                       Modalities                                              "

## 2025-06-26 ENCOUNTER — OFFICE VISIT (OUTPATIENT)
Dept: PHYSICAL THERAPY | Facility: CLINIC | Age: 25
End: 2025-06-26
Payer: COMMERCIAL

## 2025-06-26 DIAGNOSIS — G89.29 CHRONIC PAIN OF RIGHT KNEE: Primary | ICD-10-CM

## 2025-06-26 DIAGNOSIS — M25.561 CHRONIC PAIN OF RIGHT KNEE: Primary | ICD-10-CM

## 2025-06-26 PROCEDURE — 97110 THERAPEUTIC EXERCISES: CPT

## 2025-06-26 PROCEDURE — 97112 NEUROMUSCULAR REEDUCATION: CPT

## 2025-06-26 NOTE — PROGRESS NOTES
"Daily Note     Today's date: 2025  Patient name: Brenda Darden  : 2000  MRN: [id number]  Referring provider: Sandrine Ott CRNP  Dx:   Encounter Diagnosis     ICD-10-CM    1. Chronic pain of right knee  M25.561     G89.29             Start Time: 08  Stop Time: 0845  Total time in clinic (min): 44 minutes    Subjective: Patient states she had less soreness after last session.       Objective: See treatment diary below      Assessment: Pt has increased stability in SLS during rebound toss and increased stability in glute medius during isometric ball toss; demonstrated through decreased sway/knee valgus.  Pt continues to benefit from skilled PT in order to progress.       Plan: Continue per plan of care.      Precautions: Universal    Access Code: [email or web address]  URL: [email or web address]  Date: 2025  Prepared by: Deon Vick    Exercises  - Single Leg Bridge  - 1 x daily - 7 x weekly - 2 sets - 10 reps  - Side Plank on Knees  - 1 x daily - 7 x weekly - 2 sets - 10 reps - 5 hold    POC expires Unit limit Auth Expiration date PT/OT/ST + Visit Limit?    47                           Visit/Unit Tracking  AUTH Status:  Date           47 Used 1 2 3 4 5           Remaining  46 45 44 43 42                Manuals                                                             Neuro Re-Ed             Single leg bridge 3x10 3x10  2x10 ea 2x10 ea 2x10   ea        Modified side plank 5\"x10            Education Posture, flat feet, orthotics.              SLS rebounder toss   Foam 2x10 Foam 2x10 Foam 2x10           Standigng hip er/abd with ball toss iso     6'        Single leg rockerboard   20xea AP 20xea AP 20xea AP                     Ther Ex             Upright bike  6' 5' 5' 6'        Lateral squat walk  Blue TB  5x10ft lap           Single leg leg press  3X8 95# R  3x8 75# L   3x8 75# ea   X10 #  3x8 75# ea   2X10 #      " "  Deadlifting  X5 bar Kickstand 10# 3x10          HSS   30\"x4 30\"x4 30\"x4                                               Ther Activity             TRX SL squats             STS   10# 3x10 10# 2x10         Lateral step downs              Gait Training                                       Modalities                                              "

## 2025-07-03 ENCOUNTER — APPOINTMENT (OUTPATIENT)
Dept: PHYSICAL THERAPY | Facility: CLINIC | Age: 25
End: 2025-07-03
Payer: COMMERCIAL

## 2025-07-05 ENCOUNTER — APPOINTMENT (OUTPATIENT)
Age: 25
End: 2025-07-05
Payer: COMMERCIAL

## 2025-07-05 DIAGNOSIS — G44.89 OTHER HEADACHE SYNDROME: ICD-10-CM

## 2025-07-05 LAB
CRP SERPL QL: 2.1 MG/L
ERYTHROCYTE [SEDIMENTATION RATE] IN BLOOD: 20 MM/HOUR (ref 0–19)
TSH SERPL DL<=0.05 MIU/L-ACNC: 2 UIU/ML (ref 0.45–4.5)

## 2025-07-05 PROCEDURE — 85652 RBC SED RATE AUTOMATED: CPT

## 2025-07-05 PROCEDURE — 36415 COLL VENOUS BLD VENIPUNCTURE: CPT

## 2025-07-05 PROCEDURE — 86618 LYME DISEASE ANTIBODY: CPT

## 2025-07-05 PROCEDURE — 86140 C-REACTIVE PROTEIN: CPT

## 2025-07-05 PROCEDURE — 84443 ASSAY THYROID STIM HORMONE: CPT

## 2025-07-06 LAB — B BURGDOR IGG+IGM SER QL IA: NEGATIVE

## 2025-07-08 ENCOUNTER — HOSPITAL ENCOUNTER (OUTPATIENT)
Dept: MRI IMAGING | Facility: CLINIC | Age: 25
Discharge: HOME | End: 2025-07-08
Attending: PSYCHIATRY & NEUROLOGY
Payer: COMMERCIAL

## 2025-07-08 DIAGNOSIS — G44.89 OTHER HEADACHE SYNDROME: ICD-10-CM

## 2025-07-08 PROCEDURE — 70551 MRI BRAIN STEM W/O DYE: CPT

## 2025-07-10 ENCOUNTER — OFFICE VISIT (OUTPATIENT)
Dept: PHYSICAL THERAPY | Facility: CLINIC | Age: 25
End: 2025-07-10
Payer: COMMERCIAL

## 2025-07-10 DIAGNOSIS — M25.561 CHRONIC PAIN OF RIGHT KNEE: Primary | ICD-10-CM

## 2025-07-10 DIAGNOSIS — G89.29 CHRONIC PAIN OF RIGHT KNEE: Primary | ICD-10-CM

## 2025-07-10 PROCEDURE — 97112 NEUROMUSCULAR REEDUCATION: CPT | Performed by: PHYSICAL THERAPIST

## 2025-07-10 PROCEDURE — 97110 THERAPEUTIC EXERCISES: CPT | Performed by: PHYSICAL THERAPIST

## 2025-07-10 NOTE — PROGRESS NOTES
"Daily Note     Today's date: 7/10/2025  Patient name: Brenda Darden  : 2000  MRN: [id number]  Referring provider: Sandrine Ott CRNP  Dx:   Encounter Diagnosis     ICD-10-CM    1. Chronic pain of right knee  M25.561     G89.29                      Subjective: Patient reports that she has been doing well.  She states that her day to day activities have not resulted in pain       Objective: See treatment diary below      Assessment: Continues to demonstrate some tightness in the hamstring region.  Fatigue was present during leg press but the previous episodes of knee valgus during leg press and standing activities has decreased.  Good stability and improvements in proprioception demonstrated during SLS today.  Continue to progress as able.        Plan: Continue per plan of care.      Precautions: Universal    Access Code: [email or web address]  URL: [email or web address]  Date: 2025  Prepared by: Deon Vick    Exercises  - Single Leg Bridge  - 1 x daily - 7 x weekly - 2 sets - 10 reps  - Side Plank on Knees  - 1 x daily - 7 x weekly - 2 sets - 10 reps - 5 hold    POC expires Unit limit Auth Expiration date PT/OT/ST + Visit Limit?    4  47                           Visit/Unit Tracking  AUTH Status:  Date 6/12 6/17 6/19 6/24 6/26 7/10         47 Used 1 2 3 4 5 6          Remaining  46 45 44 43 42 41               Manuals 6/12 6/17 6/19 6/24 6/26 7/10                                                           Neuro Re-Ed             Single leg bridge 3x10 3x10  2x10 ea 2x10 ea 2x10   ea 2x10 10# KB       Modified side plank 5\"x10            Education Posture, flat feet, orthotics.              SLS rebounder toss   Foam 2x10 Foam 2x10 Foam 2x10    Foam 2x20 RMB        Standigng hip er/abd with ball toss iso     6'        Single leg rockerboard   20xea AP 20xea AP 20xea AP 20x a/p                    Ther Ex             Upright bike  6' 5' 5' 6' 8'       Lateral squat walk  Blue TB  5x10ft " "lap           Single leg leg press  3X8 95# R  3x8 75# L   3x8 75# ea   X10 #  3x8 75# ea   2X10 # 3x8 85# 2x10 105#       Deadlifting  X5 bar Kickstand 10# 3x10          HSS   30\"x4 30\"x4 30\"x4 30\"x4                                              Ther Activity             TRX SL squats             STS   10# 3x10 10# 2x10         Lateral step downs       6\" 2x10       Gait Training                                       Modalities                                                "

## 2025-07-11 DIAGNOSIS — J32.0 MAXILLARY SINUSITIS, UNSPECIFIED CHRONICITY: Primary | ICD-10-CM

## 2025-07-11 NOTE — PROGRESS NOTES
Discussed with patient MRI of the brain results advised the patient to follow-up with family physician regarding acute sinusitis also recommended patient to be seen by ENT referral was put in the computer

## 2025-07-14 ENCOUNTER — OFFICE VISIT (OUTPATIENT)
Dept: PHYSICAL THERAPY | Facility: CLINIC | Age: 25
End: 2025-07-14
Payer: COMMERCIAL

## 2025-07-14 DIAGNOSIS — M25.561 CHRONIC PAIN OF RIGHT KNEE: Primary | ICD-10-CM

## 2025-07-14 DIAGNOSIS — G89.29 CHRONIC PAIN OF RIGHT KNEE: Primary | ICD-10-CM

## 2025-07-14 PROCEDURE — 97112 NEUROMUSCULAR REEDUCATION: CPT

## 2025-07-14 PROCEDURE — 97110 THERAPEUTIC EXERCISES: CPT

## 2025-07-14 NOTE — PROGRESS NOTES
Daily Note     Today's date: 2025  Patient name: Brenda Darden  : 2000  MRN: [id number]  Referring provider: Sandrine Ott CRNP  Dx:   Encounter Diagnosis     ICD-10-CM    1. Chronic pain of right knee  M25.561     G89.29           Start Time: 1730  Stop Time: 1815  Total time in clinic (min): 45 minutes    Subjective: Patient reports that her knee is feeling much better.  Patient states that since she started therapy, she is experiencing some pelvic floor pain/discomfort.        Objective: See treatment diary below      Assessment: Tolerated treatment well.   Patient participated in skilled PT session focused on strengthening, stretching, and ROM.  Patient able to complete exercise program with no issues.  Increased some reps with the leg press, which patient tolerated well.  Patient continues with tightness in B/L HS.  Patient continues to demonstrate good stability and improvements in proprioception with SLS activities.  Patient focused on core stabilization with exercises this session.  Patient would continue to benefit from skilled PT interventions to address strengthening, stretching, and ROM. Patient demonstrated fatigue post treatment      Plan: Continue per plan of care.      Precautions: Universal    Access Code: [email or web address]  URL: [email or web address]  Date: 2025  Prepared by: Deon Vick    Exercises  - Single Leg Bridge  - 1 x daily - 7 x weekly - 2 sets - 10 reps  - Side Plank on Knees  - 1 x daily - 7 x weekly - 2 sets - 10 reps - 5 hold    POC expires Unit limit Auth Expiration date PT/OT/ST + Visit Limit?    4  47                           Visit/Unit Tracking  AUTH Status:  Date 6/12 6/17 6/19 6/24 6/26 7/10 7/14        47 Used 1 2 3 4 5 6 7         Remaining  46 45 44 43 42 41 40              Manuals 6/12 6/17 6/19 6/24 6/26 7/10 7/14                                                          Neuro Re-Ed             Single leg bridge 3x10 3x10  2x10 ea  "2x10 ea 2x10   ea 2x10 10# KB 2x10 10# KB      Modified side plank 5\"x10            Education Posture, flat feet, orthotics.              SLS rebounder toss   Foam 2x10 Foam 2x10 Foam 2x10    Foam 2x20 RMB  Foam 2x10 RMB      Standigng hip er/abd with ball toss iso     6'        Single leg rockerboard   20xea AP 20xea AP 20xea AP 20x a/p 20x ea  A/P  M/L                     Ther Ex             Upright bike  6' 5' 5' 6' 8' 8'      Lateral squat walk  Blue TB  5x10ft lap           Single leg leg press  3X8 95# R  3x8 75# L   3x8 75# ea   X10 #  3x8 75# ea   2X10 # 3x8 85# 2x10 105# SL 85#  3x10    #        Deadlifting  X5 bar Kickstand 10# 3x10          HSS   30\"x4 30\"x4 30\"x4 30\"x4 Supine w/strap 30\" 4x ea                                             Ther Activity             TRX SL squats             STS   10# 3x10 10# 2x10         Lateral step downs       6\" 2x10 6\" 2x10      Gait Training                                       Modalities                                                  "

## 2025-07-17 ENCOUNTER — OFFICE VISIT (OUTPATIENT)
Dept: PHYSICAL THERAPY | Facility: CLINIC | Age: 25
End: 2025-07-17
Payer: COMMERCIAL

## 2025-07-17 DIAGNOSIS — G89.29 CHRONIC PAIN OF RIGHT KNEE: Primary | ICD-10-CM

## 2025-07-17 DIAGNOSIS — M25.561 CHRONIC PAIN OF RIGHT KNEE: Primary | ICD-10-CM

## 2025-07-17 PROCEDURE — 97110 THERAPEUTIC EXERCISES: CPT | Performed by: PHYSICAL MEDICINE & REHABILITATION

## 2025-07-17 PROCEDURE — 97112 NEUROMUSCULAR REEDUCATION: CPT | Performed by: PHYSICAL MEDICINE & REHABILITATION

## 2025-07-17 PROCEDURE — 97530 THERAPEUTIC ACTIVITIES: CPT | Performed by: PHYSICAL MEDICINE & REHABILITATION

## 2025-07-17 NOTE — PROGRESS NOTES
"Daily Note     Today's date: 2025  Patient name: Brenda Darden  : 2000  MRN: [id number]  Referring provider: Sandrine Ott CRNP  Dx:   Encounter Diagnosis     ICD-10-CM    1. Chronic pain of right knee  M25.561     G89.29                    Subjective: Patient offers no new complaints to begin session regarding R knee.      Objective: See treatment diary below    Assessment: Tolerated treatment well. No pelvic pain within session, assess response nv. Patient demonstrated fatigue post treatment, exhibited good technique with therapeutic exercises, and would benefit from continued PT. Continue as able nv per patient tolerance.     Plan: Continue per plan of care.      Precautions: Universal    Access Code: [email or web address]  URL: [email or web address]  Date: 2025  Prepared by: Deon Vick    Exercises  - Single Leg Bridge  - 1 x daily - 7 x weekly - 2 sets - 10 reps  - Side Plank on Knees  - 1 x daily - 7 x weekly - 2 sets - 10 reps - 5 hold    POC expires Unit limit Auth Expiration date PT/OT/ST + Visit Limit?    4  47                           Visit/Unit Tracking  AUTH Status:  Date 6/12 6/17 6/19 6/24 6/26 7/10 7/14 7/17       47 Used 1 2 3 4 5 6 7 8        Remaining  46 45 44 43 42 41 40 39           Manuals 6/12 6/17 6/19 6/24 6/26 7/10 7/14 7/17                                                         Neuro Re-Ed             Single leg bridge 3x10 3x10  2x10 ea 2x10 ea 2x10   ea 2x10 10# KB 2x10 10# KB 2x10 10# kb     Modified side plank 5\"x10            Education Posture, flat feet, orthotics.              SLS rebounder toss   Foam 2x10 Foam 2x10 Foam 2x10    Foam 2x20 RMB  Foam 2x10 RMB      Standigng hip er/abd with ball toss iso     6'   SLS w/ KB pass 5# 10x ea     Single leg rockerboard   20xea AP 20xea AP 20xea AP 20x a/p 20x ea  A/P  M/L   20x ea, AP                  Ther Ex             Upright bike  6' 5' 5' 6' 8' 8' Recumbent, 8' L1     Lateral squat walk " " Blue TB  5x10ft lap           Single leg leg press  3X8 95# R  3x8 75# L   3x8 75# ea   X10 #  3x8 75# ea   2X10 # 3x8 85# 2x10 105# SL 85#  3x10    #   SL 85#  2x10 ea    # 2x10     Deadlifting  X5 bar Kickstand 10# 3x10          HSS   30\"x4 30\"x4 30\"x4 30\"x4 Supine w/strap 30\" 4x ea 4x30\" ea                                            Ther Activity        7/17     TRX SL squats             STS   10# 3x10 10# 2x10    10# 2x10     Lateral step downs       6\" 2x10 6\" 2x10 6\" 2x10 ea     Gait Training                                       Modalities                                                  "

## 2025-07-20 DIAGNOSIS — R11.2 NAUSEA AND VOMITING, UNSPECIFIED VOMITING TYPE: ICD-10-CM

## 2025-07-20 DIAGNOSIS — F41.1 GENERALIZED ANXIETY DISORDER: ICD-10-CM

## 2025-07-21 RX ORDER — ONDANSETRON 4 MG/1
4 TABLET, ORALLY DISINTEGRATING ORAL EVERY 6 HOURS PRN
Qty: 30 TABLET | Refills: 0 | Status: SHIPPED | OUTPATIENT
Start: 2025-07-21

## 2025-07-21 RX ORDER — HYDROXYZINE HYDROCHLORIDE 25 MG/1
25 TABLET, FILM COATED ORAL DAILY PRN
Qty: 30 TABLET | Refills: 0 | Status: SHIPPED | OUTPATIENT
Start: 2025-07-21 | End: 2025-09-19

## 2025-07-24 ENCOUNTER — OFFICE VISIT (OUTPATIENT)
Dept: PHYSICAL THERAPY | Facility: CLINIC | Age: 25
End: 2025-07-24
Payer: COMMERCIAL

## 2025-07-24 DIAGNOSIS — G89.29 CHRONIC PAIN OF RIGHT KNEE: Primary | ICD-10-CM

## 2025-07-24 DIAGNOSIS — M25.561 CHRONIC PAIN OF RIGHT KNEE: Primary | ICD-10-CM

## 2025-07-24 PROCEDURE — 97110 THERAPEUTIC EXERCISES: CPT | Performed by: PHYSICAL THERAPIST

## 2025-07-24 PROCEDURE — 97112 NEUROMUSCULAR REEDUCATION: CPT | Performed by: PHYSICAL THERAPIST

## 2025-07-24 PROCEDURE — 97530 THERAPEUTIC ACTIVITIES: CPT | Performed by: PHYSICAL THERAPIST

## 2025-07-24 NOTE — PROGRESS NOTES
"Daily Note     Today's date: 2025  Patient name: Brenda Darden  : 2000  MRN: [id number]  Referring provider: Sandrine Ott CRNP  Dx:   Encounter Diagnosis     ICD-10-CM    1. Chronic pain of right knee  M25.561     G89.29                      Subjective: Patient reports that she is doing well.  She did recently have a longer day of walking that resulted in some soreness in the lateral aspect of the knee      Objective: See treatment diary below      Assessment: Progressed resistance for the patient today with good tolerance today.  Showing less knee valgus during single leg movements.  Patient required verbal and tactile cueing throughout prescribed exercises for proper execution and dosage.        Plan: Continue per plan of care.      Precautions: Universal    Access Code: [email or web address]  URL: [email or web address]  Date: 2025  Prepared by: Deon Vick    Exercises  - Single Leg Bridge  - 1 x daily - 7 x weekly - 2 sets - 10 reps  - Side Plank on Knees  - 1 x daily - 7 x weekly - 2 sets - 10 reps - 5 hold    POC expires Unit limit Auth Expiration date PT/OT/ST + Visit Limit?    4  47                           Visit/Unit Tracking  AUTH Status:  Date 6/12 6/17 6/19 6/24 6/26 7/10 7/14 7/17 7/24      47 Used 1 2 3 4 5 6 7 8 9       Remaining  46 45 44 43 42 41 40 39 38          Manuals 6/12 6/17 6/19 6/24 6/26 7/10 7/14 7/17 7/24                                                        Neuro Re-Ed             Single leg bridge 3x10 3x10  2x10 ea 2x10 ea 2x10   ea 2x10 10# KB 2x10 10# KB 2x10 10# kb 3x10 10# KB    Modified side plank 5\"x10            Education Posture, flat feet, orthotics.              SLS rebounder toss   Foam 2x10 Foam 2x10 Foam 2x10    Foam 2x20 RMB  Foam 2x10 RMB  Foam basketball 20xea    Standigng hip er/abd with ball toss iso     6'   SLS w/ KB pass 5# 10x ea     Single leg rockerboard   20xea AP 20xea AP 20xea AP 20x a/p 20x ea  A/P  M/L   20x ea, " "AP 30x ap                 Ther Ex        7/17     Upright bike  6' 5' 5' 6' 8' 8' Recumbent, 8' L1 6'    Lateral squat walk  Blue TB  5x10ft lap           Single leg leg press  3X8 95# R  3x8 75# L   3x8 75# ea   X10 #  3x8 75# ea   2X10 # 3x8 85# 2x10 105# SL 85#  3x10    #   SL 85#  2x10 ea    # 2x10 SL 85#  2x10 ea    # 2x10    Deadlifting  X5 bar Kickstand 10# 3x10          HSS   30\"x4 30\"x4 30\"x4 30\"x4 Supine w/strap 30\" 4x ea 4x30\" ea 30\"x4                                           Ther Activity        7/17     TRX SL squats             STS   10# 3x10 10# 2x10    10# 2x10 10\" 3x10    Lateral step downs       6\" 2x10 6\" 2x10 6\" 2x10 ea     Gait Training                                       Modalities                                                    "

## 2025-07-31 ENCOUNTER — APPOINTMENT (OUTPATIENT)
Dept: PHYSICAL THERAPY | Facility: CLINIC | Age: 25
End: 2025-07-31
Payer: COMMERCIAL

## 2025-08-07 ENCOUNTER — OFFICE VISIT (OUTPATIENT)
Dept: PHYSICAL THERAPY | Facility: CLINIC | Age: 25
End: 2025-08-07
Payer: COMMERCIAL

## 2025-08-07 DIAGNOSIS — G89.29 CHRONIC PAIN OF RIGHT KNEE: Primary | ICD-10-CM

## 2025-08-07 DIAGNOSIS — M25.561 CHRONIC PAIN OF RIGHT KNEE: Primary | ICD-10-CM

## 2025-08-07 PROCEDURE — 97110 THERAPEUTIC EXERCISES: CPT | Performed by: PHYSICAL THERAPIST

## 2025-08-21 ENCOUNTER — OFFICE VISIT (OUTPATIENT)
Dept: PHYSICAL THERAPY | Facility: CLINIC | Age: 25
End: 2025-08-21
Payer: COMMERCIAL

## 2025-08-21 DIAGNOSIS — G89.29 CHRONIC PAIN OF RIGHT KNEE: Primary | ICD-10-CM

## 2025-08-21 DIAGNOSIS — M25.561 CHRONIC PAIN OF RIGHT KNEE: Primary | ICD-10-CM

## 2025-08-21 PROCEDURE — 97112 NEUROMUSCULAR REEDUCATION: CPT | Performed by: PHYSICAL THERAPIST

## 2025-08-21 PROCEDURE — 97110 THERAPEUTIC EXERCISES: CPT | Performed by: PHYSICAL THERAPIST

## 2025-08-21 PROCEDURE — 97530 THERAPEUTIC ACTIVITIES: CPT | Performed by: PHYSICAL THERAPIST
